# Patient Record
Sex: FEMALE | Race: WHITE | NOT HISPANIC OR LATINO | Employment: FULL TIME | URBAN - METROPOLITAN AREA
[De-identification: names, ages, dates, MRNs, and addresses within clinical notes are randomized per-mention and may not be internally consistent; named-entity substitution may affect disease eponyms.]

---

## 2017-05-11 DIAGNOSIS — I10 ESSENTIAL HYPERTENSION: ICD-10-CM

## 2017-05-11 DIAGNOSIS — Z13.820 SCREENING FOR OSTEOPOROSIS: Primary | ICD-10-CM

## 2017-05-11 RX ORDER — LISINOPRIL 20 MG/1
20 TABLET ORAL DAILY
Qty: 90 TABLET | Refills: 1 | Status: SHIPPED | OUTPATIENT
Start: 2017-05-11 | End: 2017-11-09

## 2017-05-11 NOTE — TELEPHONE ENCOUNTER
"POC note 11/23/16: Will change the lisinopril to 20mg and have her check some BPs at home and call us with readings. If <130/80 will stay on 20mg  Pt states her BPs in December were:  118/69, 125/82, 123/68, 127/71, 112/66, 119/68, 123/72, 124/74.   Pt also wanting to schedule bone density scan. POC note 11/23/16: Will repeat dexa 2 yrs from her last one and include the wrist at that time but won't treat just based on wrist scores.\"  According to pt's chart last DEXA 2014, pt due. DEXA orders placed, pt transferred to scheduling to make appt.    Dr. Lovell- ok to refill lisinopril until next annual due time?   "

## 2017-05-19 ENCOUNTER — RADIANT APPOINTMENT (OUTPATIENT)
Dept: BONE DENSITY | Facility: CLINIC | Age: 62
End: 2017-05-19
Attending: OBSTETRICS & GYNECOLOGY
Payer: COMMERCIAL

## 2017-05-19 ENCOUNTER — TELEPHONE (OUTPATIENT)
Dept: OBGYN | Facility: CLINIC | Age: 62
End: 2017-05-19

## 2017-05-19 DIAGNOSIS — Z13.820 SCREENING FOR OSTEOPOROSIS: ICD-10-CM

## 2017-05-19 PROCEDURE — 77080 DXA BONE DENSITY AXIAL: CPT | Mod: 59

## 2017-05-19 PROCEDURE — 77081 DXA BONE DENSITY APPENDICULR: CPT

## 2017-05-19 NOTE — TELEPHONE ENCOUNTER
Spoke w Komal cortez regarding taking of vitamins prior to scan They stated it would be okay for pt to come in for scan.  Informed that she should be able completed scan today as scheduled. No further questions voiced.

## 2017-05-19 NOTE — TELEPHONE ENCOUNTER
Reason for Call:  Other returning call    Detailed comments: Patient scheduled for dexa scan  Forgot to not take Vitamins took them yesterday morning     Phone Number Patient can be reached at: Home number on file [phone] (home)    Best Time: Anytime    Can we leave a detailed message on this number? YES    Call taken on 5/19/2017 at 8:46 AM by Xavi Chaparro

## 2017-11-09 DIAGNOSIS — I10 ESSENTIAL HYPERTENSION: ICD-10-CM

## 2017-11-09 RX ORDER — LISINOPRIL 20 MG/1
TABLET ORAL
Qty: 90 TABLET | Refills: 0 | Status: SHIPPED | OUTPATIENT
Start: 2017-11-09 | End: 2018-02-07

## 2017-11-09 NOTE — TELEPHONE ENCOUNTER
lisinopril (PRINIVIL/ZESTRIL) 20 MG tablet   Last Written Prescription Date:  5/11/17  Last Fill Quantity: 90,   # refills: 1  Last Office Visit with [facility] primary care provider:  11/23/16  Future Office visit: 12/27/17    Routing refill request to provider for review/approval because:  Pt due for annual. Has appointment scheduled. Three month refill sent per [place] protocol.

## 2018-02-07 ENCOUNTER — OFFICE VISIT (OUTPATIENT)
Dept: OBGYN | Facility: CLINIC | Age: 63
End: 2018-02-07
Attending: OBSTETRICS & GYNECOLOGY
Payer: COMMERCIAL

## 2018-02-07 ENCOUNTER — RADIANT APPOINTMENT (OUTPATIENT)
Dept: MAMMOGRAPHY | Facility: CLINIC | Age: 63
End: 2018-02-07
Attending: OBSTETRICS & GYNECOLOGY
Payer: COMMERCIAL

## 2018-02-07 VITALS
HEIGHT: 61 IN | BODY MASS INDEX: 40.02 KG/M2 | SYSTOLIC BLOOD PRESSURE: 110 MMHG | DIASTOLIC BLOOD PRESSURE: 70 MMHG | WEIGHT: 212 LBS

## 2018-02-07 DIAGNOSIS — M81.0 OSTEOPOROSIS, SENILE: ICD-10-CM

## 2018-02-07 DIAGNOSIS — Z13.228 SCREENING FOR METABOLIC DISORDER: ICD-10-CM

## 2018-02-07 DIAGNOSIS — Z12.31 VISIT FOR SCREENING MAMMOGRAM: ICD-10-CM

## 2018-02-07 DIAGNOSIS — I10 ESSENTIAL HYPERTENSION: ICD-10-CM

## 2018-02-07 DIAGNOSIS — Z13.220 ENCOUNTER FOR LIPID SCREENING FOR CARDIOVASCULAR DISEASE: ICD-10-CM

## 2018-02-07 DIAGNOSIS — Z13.6 ENCOUNTER FOR LIPID SCREENING FOR CARDIOVASCULAR DISEASE: ICD-10-CM

## 2018-02-07 DIAGNOSIS — E66.01 MORBID OBESITY (H): ICD-10-CM

## 2018-02-07 DIAGNOSIS — Z01.419 ENCOUNTER FOR GYNECOLOGICAL EXAMINATION WITHOUT ABNORMAL FINDING: Primary | ICD-10-CM

## 2018-02-07 LAB
ALBUMIN SERPL-MCNC: 3.7 G/DL (ref 3.4–5)
ALP SERPL-CCNC: 63 U/L (ref 40–150)
ALT SERPL W P-5'-P-CCNC: 28 U/L (ref 0–50)
ANION GAP SERPL CALCULATED.3IONS-SCNC: 7 MMOL/L (ref 3–14)
AST SERPL W P-5'-P-CCNC: 20 U/L (ref 0–45)
BILIRUB SERPL-MCNC: 0.6 MG/DL (ref 0.2–1.3)
BUN SERPL-MCNC: 15 MG/DL (ref 7–30)
CALCIUM SERPL-MCNC: 9.4 MG/DL (ref 8.5–10.1)
CHLORIDE SERPL-SCNC: 107 MMOL/L (ref 94–109)
CHOLEST SERPL-MCNC: 173 MG/DL
CO2 SERPL-SCNC: 25 MMOL/L (ref 20–32)
CREAT SERPL-MCNC: 0.65 MG/DL (ref 0.52–1.04)
GFR SERPL CREATININE-BSD FRML MDRD: >90 ML/MIN/1.7M2
GLUCOSE SERPL-MCNC: 86 MG/DL (ref 70–99)
HDLC SERPL-MCNC: 64 MG/DL
LDLC SERPL CALC-MCNC: 82 MG/DL
NONHDLC SERPL-MCNC: 109 MG/DL
POTASSIUM SERPL-SCNC: 4.1 MMOL/L (ref 3.4–5.3)
PROT SERPL-MCNC: 7.7 G/DL (ref 6.8–8.8)
SODIUM SERPL-SCNC: 139 MMOL/L (ref 133–144)
TRIGL SERPL-MCNC: 137 MG/DL

## 2018-02-07 PROCEDURE — 80061 LIPID PANEL: CPT | Performed by: OBSTETRICS & GYNECOLOGY

## 2018-02-07 PROCEDURE — 36415 COLL VENOUS BLD VENIPUNCTURE: CPT | Performed by: OBSTETRICS & GYNECOLOGY

## 2018-02-07 PROCEDURE — 80053 COMPREHEN METABOLIC PANEL: CPT | Performed by: OBSTETRICS & GYNECOLOGY

## 2018-02-07 PROCEDURE — 87624 HPV HI-RISK TYP POOLED RSLT: CPT | Performed by: OBSTETRICS & GYNECOLOGY

## 2018-02-07 PROCEDURE — G0145 SCR C/V CYTO,THINLAYER,RESCR: HCPCS | Performed by: OBSTETRICS & GYNECOLOGY

## 2018-02-07 PROCEDURE — 77067 SCR MAMMO BI INCL CAD: CPT | Mod: TC

## 2018-02-07 PROCEDURE — 99396 PREV VISIT EST AGE 40-64: CPT | Performed by: OBSTETRICS & GYNECOLOGY

## 2018-02-07 RX ORDER — LISINOPRIL 20 MG/1
TABLET ORAL
Qty: 90 TABLET | Refills: 3 | Status: SHIPPED | OUTPATIENT
Start: 2018-02-07 | End: 2019-02-15

## 2018-02-07 ASSESSMENT — ANXIETY QUESTIONNAIRES
GAD7 TOTAL SCORE: 0
1. FEELING NERVOUS, ANXIOUS, OR ON EDGE: NOT AT ALL
7. FEELING AFRAID AS IF SOMETHING AWFUL MIGHT HAPPEN: NOT AT ALL
IF YOU CHECKED OFF ANY PROBLEMS ON THIS QUESTIONNAIRE, HOW DIFFICULT HAVE THESE PROBLEMS MADE IT FOR YOU TO DO YOUR WORK, TAKE CARE OF THINGS AT HOME, OR GET ALONG WITH OTHER PEOPLE: NOT DIFFICULT AT ALL
3. WORRYING TOO MUCH ABOUT DIFFERENT THINGS: NOT AT ALL
2. NOT BEING ABLE TO STOP OR CONTROL WORRYING: NOT AT ALL
6. BECOMING EASILY ANNOYED OR IRRITABLE: NOT AT ALL
5. BEING SO RESTLESS THAT IT IS HARD TO SIT STILL: NOT AT ALL

## 2018-02-07 ASSESSMENT — PATIENT HEALTH QUESTIONNAIRE - PHQ9: 5. POOR APPETITE OR OVEREATING: NOT AT ALL

## 2018-02-07 NOTE — MR AVS SNAPSHOT
"              After Visit Summary   2/7/2018    Ana Dupree    MRN: [id number]           Patient Information     Date Of Birth          1955        Visit Information        Provider Department      2/7/2018 8:40 AM Oliva Lovell MD [facility] [place]        Today's Diagnoses     Encounter for gynecological examination without abnormal finding    -  1    Essential hypertension        Morbid obesity (H)        Osteoporosis, senile        Encounter for lipid screening for cardiovascular disease        Screening for metabolic disorder           Follow-ups after your visit        Who to contact     If you have questions or need follow up information about today's clinic visit or your schedule please contact [facility] directly at [phone].  Normal or non-critical lab and imaging results will be communicated to you by MyChart, letter or phone within 4 business days after the clinic has received the results. If you do not hear from us within 7 days, please contact the clinic through MyChart or phone. If you have a critical or abnormal lab result, we will notify you by phone as soon as possible.  Submit refill requests through [employer] or call your pharmacy and they will forward the refill request to us. Please allow 3 business days for your refill to be completed.          Additional Information About Your Visit        MyChart Information     [employer] lets you send messages to your doctor, view your test results, renew your prescriptions, schedule appointments and more. To sign up, go to [email or web address] . Click on \"Log in\" on the left side of the screen, which will take you to the Welcome page. Then click on \"Sign up Now\" on the right side of the page.     You will be asked to enter the access code listed below, as well as some personal information. Please follow the directions to create your username and password.     Your access code is: [email or web address]  Expires: " "2018  7:38 PM     Your access code will  in 90 days. If you need help or a new code, please call your [place] clinic or [phone].        Care EveryWhere ID     This is your Care EveryWhere ID. This could be used by other organizations to access your [place] medical records  [id number]        Your Vitals Were     Height BMI (Body Mass Index)                5' 0.5\" (1.537 m) 40.72 kg/m2           Blood Pressure from Last 3 Encounters:   18 110/70   17 104/63   16 92/58    Weight from Last 3 Encounters:   18 212 lb (96.2 kg)   17 200 lb (90.7 kg)   16 206 lb (93.4 kg)              We Performed the Following     Comprehensive metabolic panel     HPV High Risk Types DNA Cervical     Lipid panel reflex to direct LDL Fasting     Pap imaged thin layer screen with HPV - recommended age 30 - 65          Today's Medication Changes          These changes are accurate as of 18  7:38 PM.  If you have any questions, ask your nurse or doctor.               These medicines have changed or have updated prescriptions.        Dose/Directions    lisinopril 20 MG tablet   Commonly known as:  PRINIVIL/ZESTRIL   This may have changed:  See the new instructions.   Used for:  Essential hypertension   Changed by:  Oliva Lovell MD        TAKE 1 TABLET(20 MG) BY MOUTH DAILY   Quantity:  90 tablet   Refills:  3            Where to get your medicines      These medications were sent to [facility] [address] [place] [address] [address] AT [facility] [address]  [address], [facility] [address]     Phone:  [phone]     lisinopril 20 MG tablet                Primary Care Provider Office Phone # Fax #    Oliva Lovell [phone] [address] [address]        Equal Access to Services     CHEY PIKE AH: Hadii aad ku hadasho Soomaali, waaxda luqadaha, qaybta kaalmada adeegyada, go prieto " racquel lozoya ah. So [facility] [phone].    ATENCIÓN: Si mayo herron, tiene a cottrell disposición servicios gratuitos de asistencia lingüística. Elsy braun [phone].    We comply with applicable federal civil rights laws and [place] laws. We do not discriminate on the basis of race, color, national origin, age, disability, sex, sexual orientation, or gender identity.            Thank you!     Thank you for choosing [facility] [place]  for your care. Our goal is always to provide you with excellent care. Hearing back from our patients is one way we can continue to improve our services. Please take a few minutes to complete the written survey that you may receive in the mail after your visit with us. Thank you!             Your Updated Medication List - Protect others around you: Learn how to safely use, store and throw away your medicines at www.disposemymeds.org.          This list is accurate as of 2/7/18  7:38 PM.  Always use your most recent med list.                   Brand Name Dispense Instructions for use Diagnosis    Calcium carb-Vitamin D 500 mg [place]-200 units 500-200 MG-UNIT per tablet    OSCAL with D;Oyster Shell Calcium    100 tablet    Take 2 tablets by mouth every 12 hours    Primary hyperparathyroidism (H)       FISH OIL PO           lisinopril 20 MG tablet    PRINIVIL/ZESTRIL    90 tablet    TAKE 1 TABLET(20 MG) BY MOUTH DAILY    Essential hypertension       * VITAMIN D3 PO      Take 2,000 Units by mouth daily        * VITAMIN D (CHOLECALCIFEROL) PO      Take by mouth daily        * Notice:  This list has 2 medication(s) that are the same as other medications prescribed for you. Read the directions carefully, and ask your doctor or other care provider to review them with you.

## 2018-02-07 NOTE — PROGRESS NOTES
"  Ana is a 62 year old  female who presents for annual exam.     Besides routine health maintenance, she has no other health concerns today .    HPI:    Patient is generally doing well but has gained back all of the weight that she lost. Had gotten to 190# with diet and exercise and not snacking at night and was feeling great. Got to a size 16 for daughter's wedding last summer and now has gained it all back and really disappointed in herself. Is eating fairly healthy all day but then \"closet\" eats at night from 8-10 and undoes all of her work all day. Has also slacked quite a bit on exercise the last 2-3 months and just now is getting back into it. Going to [employer] in the mornings for a class and loves it but just now starting back to it. Niece getting  this summer so going to try to get back in shape for it    Cut down on her lisinopril from 30mg to 20mg b/c BP was too low. Hoped to lose enough weight to quit it all together. BP is still great today on 20mg.  Fasting to repeat labs today as well. Trigs were slightly elevated last time but other values were actually quite good.    No PMB, no abnormal d/c, no vasomotor sx. Not s.a for years. Kids keep trying to convince her to try dating but feels fat and unattractive so hasn't pursued it.    Patient has mild penia of hips and spine is normal. Ulna done at some point and was osteoporosis range but choosing not to treat just based on that.    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 Son and his GF moved back from [place] and living with her. Older daughter  and a teacher. Younger daughter is dating someone and lives in [place]    The patient does not have a primary care provider.      GYNECOLOGIC HISTORY:    No LMP recorded. Patient is postmenopausal.  She  reports that she has never smoked. She has never used smokeless tobacco.  Patient is not sexually active.  STD testing offered?  not sexually " active     Last PHQ-9 score on record =   PHQ-9 SCORE 2018   Total Score 1     Last GAD7 score on record =   SAMIR-7 SCORE 2018   Total Score 0     Alcohol Score = 1    HEALTH MAINTENANCE:  Cholesterol: 16   Total= 184, Triglycerides=187, HDL=54, LDL=93, FBS=76, A1C=5.3, TSH=0.60  Last Mammo: 16, Result: normal, Next Mammo: today   Pap: 13  Negative, Neg-HPV  Colonoscopy:  17, Result: polyp, repeat 5 years, Next Colonoscopy:   Dexa:  17  h/o osteoporosis in wrist  Left Hip -1.3, Right Hip -1.2, Spine -0.4 ulna -3.1  Health maintenance updated:  yes    HISTORY:  Obstetric History       T3      L3     SAB1   TAB0   Ectopic0   Multiple0   Live Births3       # Outcome Date GA Lbr Jesus/2nd Weight Sex Delivery Anes PTL Lv   4 Term 89 40w0d  8 lb 2 oz (3.685 kg) F Vag-Vacuum   NATASHA   3 Term 89 42w0d  8 lb 1 oz (3.657 kg) F Vag-Vacuum   NATASHA   2 Term 89    M    NATASHA   1 SAB                   Patient Active Problem List   Diagnosis     Essential hypertension     Osteoporosis, senile     Morbid obesity (H)     Past Surgical History:   Procedure Laterality Date     BIOPSY BREAST  2001, 2014    excisional left side-B9 fibrocystic change. stereotactic bx left at 2 oclock for calcs. path-atypical ductal proliferation     COLONOSCOPY N/A 2017    Procedure: COMBINED COLONOSCOPY, SINGLE OR MULTIPLE BIOPSY/POLYPECTOMY BY BIOPSY;  Surgeon: Jerald Grant MD;  Location:  GI     LITHOTRIPSY  2007     MAMMOPLASTY REDUCTION  2014    Dr. Trujillo and Reji. had atypical cells noted on a bx but also wanted reduction so done together.     PARATHYROIDECTOMY Right 2015    Procedure: PARATHYROIDECTOMY;  Surgeon: Lynnette Sewell MD;  Location: [facility] OR     TUBAL LIGATION  1994    post partum      Social History   Substance Use Topics     Smoking status: Never Smoker     Smokeless tobacco: Never Used     Alcohol use No      Problem (# of  "Occurrences) Relation (Name,Age of Onset)    Breast Cancer (1) Maternal Grandmother    CEREBROVASCULAR DISEASE (2) Mother, Paternal Grandmother    Colon Cancer (1) Father (90)    Coronary Artery Disease (1) Mother    DIABETES (1) Mother    Hyperlipidemia (1) Mother    Hypertension (1) Mother    Other Cancer (1) Maternal Grandmother            Current Outpatient Prescriptions   Medication Sig     VITAMIN D, CHOLECALCIFEROL, PO Take by mouth daily     Omega-3 Fatty Acids (FISH OIL PO)      lisinopril (PRINIVIL/ZESTRIL) 20 MG tablet TAKE 1 TABLET(20 MG) BY MOUTH DAILY     Cholecalciferol (VITAMIN D3 PO) Take 2,000 Units by mouth daily     calcium carb 1250 mg, 500 mg [place],/vitamin D 200 units (OSCAL WITH D) 500-200 MG-UNIT per tablet Take 2 tablets by mouth every 12 hours     [DISCONTINUED] lisinopril (PRINIVIL/ZESTRIL) 20 MG tablet TAKE 1 TABLET(20 MG) BY MOUTH DAILY     [DISCONTINUED] lisinopril (PRINIVIL,ZESTRIL) 30 MG tablet TAKE 1 TABLET BY MOUTH DAILY     No current facility-administered medications for this visit.      No Known Allergies    Past medical, surgical, social and family histories were reviewed and updated in EPIC.    ROS:   12 point review of systems negative other than symptoms noted below.    EXAM:  /70  Ht 5' 0.5\" (1.537 m)  Wt 212 lb (96.2 kg)  BMI 40.72 kg/m2   BMI: Body mass index is 40.72 kg/(m^2).    PHYSICAL EXAM:  Constitutional:  Appearance: Well nourished, well developed, alert, in no acute distress  Neck:  Lymph Nodes:  No lymphadenopathy present    Thyroid:  Gland size normal, nontender, no nodules or masses present  on palpation  Chest:  Respiratory Effort:  Breathing unlabored  Cardiovascular:    Heart: Auscultation:  Regular rate, normal rhythm, no murmurs present  Breasts: Palpation of Breasts and Axillae:  No masses present on palpation, no breast tenderness., No nodularity, asymmetry or nipple discharge bilaterally. and HAS TWO SMOOTH AND VERY ROUND DENSITIES IN THE LATER " LEFT BREAST AT 2 AND 5 OCLOCK. BOTH LIKELY SCAR TISSUE AND BREAST RECONSTRUCTION EFFECTS FROM REDUCTION  Gastrointestinal:   Abdominal Examination:  Abdomen nontender to palpation, tone normal without rigidity or guarding, no masses present, umbilicus without lesions   Liver and Spleen:  No hepatomegaly present, liver nontender to palpation    Hernias:  No hernias present  Lymphatic: Lymph Nodes:  No other lymphadenopathy present  Skin:  General Inspection:  No rashes present, no lesions present, no areas of  discoloration    Genitalia and Groin:  No rashes present, no lesions present, no areas of  discoloration, no masses present  Neurologic/Psychiatric:    Mental Status:  Oriented X3     Pelvic Exam:  External Genitalia:     Normal appearance for age, no discharge present, no tenderness present, no inflammatory lesions present, color normal  Vagina:     Normal vaginal vault without central or paravaginal defects, no discharge present, no inflammatory lesions present, no masses present  Bladder:     Nontender to palpation  Urethra:   Urethral Body:  Urethra palpation normal, urethra structural support normal   Urethral Meatus:  No erythema or lesions present  Cervix:     Appearance healthy, no lesions present, nontender to palpation, no bleeding present  Uterus:     Uterus: firm, normal sized and nontender, anteverted in position.   Adnexa:     No adnexal tenderness present, no adnexal masses present  Perineum:     Perineum within normal limits, no evidence of trauma, no rashes or skin lesions present  Anus:     Anus within normal limits, no hemorrhoids present  Inguinal Lymph Nodes:     No lymphadenopathy present  Pubic Hair:     Normal pubic hair distribution for age  Genitalia and Groin:     No rashes present, no lesions present, no areas of discoloration, no masses present      COUNSELING:   Reviewed preventive health counseling, as reflected in patient instructions  Special attention given to:        Regular  exercise       Healthy diet/nutrition    BMI: Body mass index is 40.72 kg/(m^2).  Weight management plan: Patient referred to endocrine and/or weight management specialty    ASSESSMENT:  62 year old female with satisfactory annual exam.    ICD-10-CM    1. Encounter for gynecological examination without abnormal finding Z01.419 Pap imaged thin layer screen with HPV - recommended age 30 - 65     HPV High Risk Types DNA Cervical   2. Essential hypertension I10 lisinopril (PRINIVIL/ZESTRIL) 20 MG tablet   3. Morbid obesity (H) E66.01    4. Osteoporosis, senile M81.0    5. Encounter for lipid screening for cardiovascular disease Z13.220 Lipid panel reflex to direct LDL Fasting    Z13.6    6. Screening for metabolic disorder Z13.228 Comprehensive metabolic panel       PLAN:  Pap and cotesting done today along with mammo    BP is low at 20mg lisinopril. Discussed cutting down to 10mg but at this point will just work on diet/exercise/weight loss and hopefully just get to a point where could stop completely rather than just cutting down. Not having side effects and BP is normal and not hypotensive.    Fasting lipids and cmp today    Repeat dexa in 2 yrs or so as only her ulna is osteoporotic and hips/spine very minimally effected    Patient has consider medifast and slimgenics but too expensive. Encouraged to contact nutrition at [facility], and check insurance to see if nutrition is covered, and see if they can lay out an eating plan for her to help her lose weight effectively and long term since at a BMI of 40 she has very low metabolic rate and will be harder to lose and sustain loss over time. Have discussed bariatric surgery in past and she did consider it but then got nervous and didn't follow up    Oliva Lovell MD

## 2018-02-07 NOTE — LETTER
February 19, 2018    Ana Dupree  [address]   [facility] [address]    Dear Ana,  We are happy to inform you that your PAP smear result from 2/7/18 is normal.  We are now able to do a follow up test on PAP smears. The DNA test is for HPV (Human Papilloma Virus). Cervical cancer is closely linked with certain types of HPV. Your result showed no evidence of high risk HPV.  Therefore we recommend you return in 3 years for your next pap smear.  You will still need to return to the clinic every year for an annual exam and other preventive tests.  Please contact the clinic at [phone] with any questions.  Sincerely,    Oliva Lovell MD/silvina

## 2018-02-08 ASSESSMENT — PATIENT HEALTH QUESTIONNAIRE - PHQ9: SUM OF ALL RESPONSES TO PHQ QUESTIONS 1-9: 1

## 2018-02-08 ASSESSMENT — ANXIETY QUESTIONNAIRES: GAD7 TOTAL SCORE: 0

## 2018-02-09 LAB
COPATH REPORT: NORMAL
PAP: NORMAL

## 2018-02-13 LAB
FINAL DIAGNOSIS: NORMAL
HPV HR 12 DNA CVX QL NAA+PROBE: NEGATIVE
HPV16 DNA SPEC QL NAA+PROBE: NEGATIVE
HPV18 DNA SPEC QL NAA+PROBE: NEGATIVE
SPECIMEN DESCRIPTION: NORMAL
SPECIMEN SOURCE CVX/VAG CYTO: NORMAL

## 2018-05-21 ENCOUNTER — TELEPHONE (OUTPATIENT)
Dept: OBGYN | Facility: CLINIC | Age: 63
End: 2018-05-21

## 2018-06-04 ENCOUNTER — TELEPHONE (OUTPATIENT)
Dept: OBGYN | Facility: CLINIC | Age: 63
End: 2018-06-04

## 2018-06-04 NOTE — TELEPHONE ENCOUNTER
Patient scheduled for bone scan 6/5, took a vitamin with calcium today.  Wondering if she needs to RS her scan. Tech not available at time of call.

## 2018-06-04 NOTE — TELEPHONE ENCOUNTER
Called pt back and reassured her she can continue with the scan tomorrow as stated by one of the imaging tech's, Telma here in office. Reminded her to not take any further vitamins/calcium prior to test.  Verbalized understanding and no further questions.  Danyelle ROJAS RN

## 2018-06-05 ENCOUNTER — RADIANT APPOINTMENT (OUTPATIENT)
Dept: BONE DENSITY | Facility: CLINIC | Age: 63
End: 2018-06-05
Payer: COMMERCIAL

## 2018-06-05 DIAGNOSIS — Z78.0 ASYMPTOMATIC POSTMENOPAUSAL STATE: ICD-10-CM

## 2018-06-05 PROCEDURE — 77081 DXA BONE DENSITY APPENDICULR: CPT

## 2018-06-05 PROCEDURE — 77080 DXA BONE DENSITY AXIAL: CPT | Mod: 59

## 2018-07-16 ENCOUNTER — HOSPITAL ENCOUNTER (EMERGENCY)
Facility: CLINIC | Age: 63
Discharge: HOME | End: 2018-07-16
Attending: EMERGENCY MEDICINE | Admitting: EMERGENCY MEDICINE
Payer: COMMERCIAL

## 2018-07-16 VITALS
TEMPERATURE: 98.1 F | SYSTOLIC BLOOD PRESSURE: 129 MMHG | RESPIRATION RATE: 16 BRPM | DIASTOLIC BLOOD PRESSURE: 85 MMHG | HEIGHT: 60 IN | OXYGEN SATURATION: 98 % | WEIGHT: 200 LBS | BODY MASS INDEX: 39.27 KG/M2

## 2018-07-16 DIAGNOSIS — N23 RENAL COLIC: ICD-10-CM

## 2018-07-16 DIAGNOSIS — N20.0 KIDNEY STONE: ICD-10-CM

## 2018-07-16 LAB
ALBUMIN SERPL-MCNC: 3.8 G/DL (ref 3.4–5)
ALBUMIN UR-MCNC: NEGATIVE MG/DL
ALP SERPL-CCNC: 67 U/L (ref 40–150)
ALT SERPL W P-5'-P-CCNC: 28 U/L (ref 0–50)
ANION GAP SERPL CALCULATED.3IONS-SCNC: 7 MMOL/L (ref 3–14)
APPEARANCE UR: CLEAR
AST SERPL W P-5'-P-CCNC: 22 U/L (ref 0–45)
BASOPHILS # BLD AUTO: 0 10E9/L (ref 0–0.2)
BASOPHILS NFR BLD AUTO: 0.2 %
BILIRUB SERPL-MCNC: 0.8 MG/DL (ref 0.2–1.3)
BILIRUB UR QL STRIP: NEGATIVE
BUN SERPL-MCNC: 12 MG/DL (ref 7–30)
CALCIUM SERPL-MCNC: 9 MG/DL (ref 8.5–10.1)
CHLORIDE SERPL-SCNC: 108 MMOL/L (ref 94–109)
CO2 SERPL-SCNC: 26 MMOL/L (ref 20–32)
COLOR UR AUTO: ABNORMAL
CREAT SERPL-MCNC: 0.7 MG/DL (ref 0.52–1.04)
DIFFERENTIAL METHOD BLD: NORMAL
EOSINOPHIL # BLD AUTO: 0.1 10E9/L (ref 0–0.7)
EOSINOPHIL NFR BLD AUTO: 0.9 %
ERYTHROCYTE [DISTWIDTH] IN BLOOD BY AUTOMATED COUNT: 13.9 % (ref 10–15)
GFR SERPL CREATININE-BSD FRML MDRD: 85 ML/MIN/1.7M2
GLUCOSE SERPL-MCNC: 118 MG/DL (ref 70–99)
GLUCOSE UR STRIP-MCNC: NEGATIVE MG/DL
HCT VFR BLD AUTO: 37.6 % (ref 35–47)
HGB BLD-MCNC: 12.1 G/DL (ref 11.7–15.7)
HGB UR QL STRIP: ABNORMAL
IMM GRANULOCYTES # BLD: 0 10E9/L (ref 0–0.4)
IMM GRANULOCYTES NFR BLD: 0.1 %
KETONES UR STRIP-MCNC: NEGATIVE MG/DL
LEUKOCYTE ESTERASE UR QL STRIP: ABNORMAL
LYMPHOCYTES # BLD AUTO: 1.7 10E9/L (ref 0.8–5.3)
LYMPHOCYTES NFR BLD AUTO: 19.1 %
MCH RBC QN AUTO: 28.4 PG (ref 26.5–33)
MCHC RBC AUTO-ENTMCNC: 32.2 G/DL (ref 31.5–36.5)
MCV RBC AUTO: 88 FL (ref 78–100)
MONOCYTES # BLD AUTO: 0.8 10E9/L (ref 0–1.3)
MONOCYTES NFR BLD AUTO: 9 %
MUCOUS THREADS #/AREA URNS LPF: PRESENT /LPF
NEUTROPHILS # BLD AUTO: 6.1 10E9/L (ref 1.6–8.3)
NEUTROPHILS NFR BLD AUTO: 70.7 %
NITRATE UR QL: NEGATIVE
NRBC # BLD AUTO: 0 10*3/UL
NRBC BLD AUTO-RTO: 0 /100
PH UR STRIP: 6 PH (ref 5–7)
PLATELET # BLD AUTO: 234 10E9/L (ref 150–450)
POTASSIUM SERPL-SCNC: 4.2 MMOL/L (ref 3.4–5.3)
PROT SERPL-MCNC: 7.9 G/DL (ref 6.8–8.8)
RBC # BLD AUTO: 4.26 10E12/L (ref 3.8–5.2)
RBC #/AREA URNS AUTO: 10 /HPF (ref 0–2)
SODIUM SERPL-SCNC: 141 MMOL/L (ref 133–144)
SOURCE: ABNORMAL
SP GR UR STRIP: 1.01 (ref 1–1.03)
SQUAMOUS #/AREA URNS AUTO: 3 /HPF (ref 0–1)
UROBILINOGEN UR STRIP-MCNC: NORMAL MG/DL (ref 0–2)
WBC # BLD AUTO: 8.6 10E9/L (ref 4–11)
WBC #/AREA URNS AUTO: 4 /HPF (ref 0–5)

## 2018-07-16 PROCEDURE — 87086 URINE CULTURE/COLONY COUNT: CPT | Performed by: EMERGENCY MEDICINE

## 2018-07-16 PROCEDURE — 85025 COMPLETE CBC W/AUTO DIFF WBC: CPT | Performed by: EMERGENCY MEDICINE

## 2018-07-16 PROCEDURE — 81001 URINALYSIS AUTO W/SCOPE: CPT | Performed by: EMERGENCY MEDICINE

## 2018-07-16 PROCEDURE — 99283 EMERGENCY DEPT VISIT LOW MDM: CPT

## 2018-07-16 PROCEDURE — 80053 COMPREHEN METABOLIC PANEL: CPT | Performed by: EMERGENCY MEDICINE

## 2018-07-16 RX ORDER — SODIUM CHLORIDE 9 MG/ML
INJECTION, SOLUTION INTRAVENOUS CONTINUOUS
Status: DISCONTINUED | OUTPATIENT
Start: 2018-07-16 | End: 2018-07-16 | Stop reason: HOSPADM

## 2018-07-16 RX ORDER — KETOROLAC TROMETHAMINE 15 MG/ML
15 INJECTION, SOLUTION INTRAMUSCULAR; INTRAVENOUS ONCE
Status: DISCONTINUED | OUTPATIENT
Start: 2018-07-16 | End: 2018-07-16 | Stop reason: HOSPADM

## 2018-07-16 ASSESSMENT — ENCOUNTER SYMPTOMS
NAUSEA: 0
VOMITING: 0
HEMATURIA: 0
DIFFICULTY URINATING: 1
FEVER: 0
FLANK PAIN: 1

## 2018-07-16 NOTE — ED AVS SNAPSHOT
Emergency Department    [address] [facility] [address]    Phone:  [phone]    Fax:  [phone]                                       Ana Dupree   MRN: [id number]    Department:   Emergency Department   Date of Visit:  7/16/2018           Patient Information     Date Of Birth          1955        Your diagnoses for this visit were:     Renal colic     Kidney stone        You were seen by Luis Enrique Black MD.      Follow-up Information     Follow up with Oliva Lovell MD.    Specialty:  OB/Gyn    Why:  As needed, If symptoms worsen    Contact information:    [address]  [facility] [address] [phone]          Follow up with  Emergency Department.    Specialty:  EMERGENCY MEDICINE    Why:  As needed, If symptoms worsen    Contact information:    [address] [facility] [id number] [phone]        Discharge Instructions       Discharge Instructions  Kidney Stones    Kidney stones are a common problem that can cause a lot of pain but fortunately are usually not dangerous. Kidney stones form in the kidney and then can cause a blockage (obstruction) of the flow of urine from the kidney which leads to pain. Most patients can manage kidney stones at home (without a hospital stay).  However, sometimes your condition may be worse than it seemed at first, or may get worse with time. Most kidney stones will pass on their own, but occasionally stones may need to be removed by an urologist.    Generally, every Emergency Department visit should have a follow-up clinic visit with either a primary or a specialty clinic/provider. Please follow-up as instructed by your emergency provider today.      Return to the Emergency Department if:    Your pain is not controlled despite the medications provided or recommended.    You are vomiting (throwing up) and cannot keep fluids or medications down.    You develop a fever (>100.4 F).    You feel much more  ill or develop new symptoms.  What can I do to help myself?    Be sure to drink plenty of fluids.    If instructed to do so, strain your urine (pee) with the urine strainer you were provided with today. Your stone may look like a grain of sand or a small pebble. Collect any stones in the cup provided and bring to your follow-up appointment.    Staying active is good, and may help the stone to pass. You may do whatever you feel up to doing without restrictions.   Treatment:    Non-steroidal anti-inflammatory drugs (NSAIDs). This includes prescription medicines like Toradol  (ketorolac) and non-prescription medicines like Advil  (ibuprofen) and Nuprin  (ibuprofen) and Naproxen. These pain relievers are very effective for kidney stones.    Nausea (sick to your stomach) medication.  Nausea and vomiting are common with kidney stones, so your provider may send you home with medicine for this.     Flomax  (tamsulosin). This medicine is sometimes used for men with prostate problems, but also can help kidney stones to pass. Its effectiveness is controversial or questionable so it is prescribed in certain situations. This medicine can lower blood pressure, and you may feel faint/lightheaded, especially when you first stand up. Be sure to get up gradually, sit down if you feel faint, and avoid activity where feeling faint would be dangerous, such as climbing ladders.  If you were given a prescription for medicine here today, be sure to read all of the information (including the package insert) that comes with your prescription.  This will include important information about the medicine, its side effects, and any warnings that you need to know about.  The pharmacist who fills the prescription can provide more information and answer questions you may have about the medicine.  If you have questions or concerns that the pharmacist cannot address, please call or return to the Emergency Department.   Remember that you can always  come back to the Emergency Department if you are not able to see your regular provider in the amount of time listed above, if you get any new symptoms, or if there is anything that worries you.    24 Hour Appointment Hotline       To make an appointment at any [facility], call [phone] ([phone]). If you don't have a family doctor or clinic, we will help you find one. [place] clinics are conveniently located to serve the needs of you and your family.             Review of your medicines      Our records show that you are taking the medicines listed below. If these are incorrect, please call your family doctor or clinic.        Dose / Directions Last dose taken    Calcium carb-Vitamin D 500 mg [place]-200 units 500-200 MG-UNIT per tablet   Commonly known as:  OSCAL with D;Oyster Shell Calcium   Dose:  2 tablet   Quantity:  100 tablet        Take 2 tablets by mouth every 12 hours   Refills:  0        FISH OIL PO        Refills:  0        lisinopril 20 MG tablet   Commonly known as:  PRINIVIL/ZESTRIL   Quantity:  90 tablet        TAKE 1 TABLET(20 MG) BY MOUTH DAILY   Refills:  3        * VITAMIN D3 PO   Dose:  2000 Units        Take 2,000 Units by mouth daily   Refills:  0        * VITAMIN D (CHOLECALCIFEROL) PO        Take by mouth daily   Refills:  0        * Notice:  This list has 2 medication(s) that are the same as other medications prescribed for you. Read the directions carefully, and ask your doctor or other care provider to review them with you.            Procedures and tests performed during your visit     CBC with platelets differential    Comprehensive metabolic panel    UA reflex to Microscopic and Culture    Urine Culture Aerobic Bacterial      Orders Needing Specimen Collection     None      Pending Results     Date and Time Order Name Status Description    7/16/2018 0740 Urine Culture Aerobic Bacterial In process             Pending Culture Results     Date and Time Order Name Status  Description    7/16/2018 0740 Urine Culture Aerobic Bacterial In process             Pending Results Instructions     If you had any lab results that were not finalized at the time of your Discharge, you can call the ED Lab Result RN at [phone]. You will be contacted by this team for any positive Lab results or changes in treatment. The nurses are available 7 days a week from 10A to 6:30P.  You can leave a message 24 hours per day and they will return your call.        Test Results From Your Hospital Stay        7/16/2018  8:10 AM      Component Results     Component Value Ref Range & Units Status    WBC 8.6 4.0 - 11.0 10e9/L Final    RBC Count 4.26 3.8 - 5.2 10e12/L Final    Hemoglobin 12.1 11.7 - 15.7 g/dL Final    Hematocrit 37.6 35.0 - 47.0 % Final    MCV 88 78 - 100 fl Final    MCH 28.4 26.5 - 33.0 pg Final    MCHC 32.2 31.5 - 36.5 g/dL Final    RDW 13.9 10.0 - 15.0 % Final    Platelet Count 234 150 - 450 10e9/L Final    Diff Method Automated Method  Final    % Neutrophils 70.7 % Final    % Lymphocytes 19.1 % Final    % Monocytes 9.0 % Final    % Eosinophils 0.9 % Final    % Basophils 0.2 % Final    % Immature Granulocytes 0.1 % Final    Nucleated RBCs 0 0 /100 Final    Absolute Neutrophil 6.1 1.6 - 8.3 10e9/L Final    Absolute Lymphocytes 1.7 0.8 - 5.3 10e9/L Final    Absolute Monocytes 0.8 0.0 - 1.3 10e9/L Final    Absolute Eosinophils 0.1 0.0 - 0.7 10e9/L Final    Absolute Basophils 0.0 0.0 - 0.2 10e9/L Final    Abs Immature Granulocytes 0.0 0 - 0.4 10e9/L Final    Absolute Nucleated RBC 0.0  Final         7/16/2018  8:33 AM      Component Results     Component Value Ref Range & Units Status    Sodium 141 133 - 144 mmol/L Final    Potassium 4.2 3.4 - 5.3 mmol/L Final    Chloride 108 94 - 109 mmol/L Final    Carbon Dioxide 26 20 - 32 mmol/L Final    Anion Gap 7 3 - 14 mmol/L Final    Glucose 118 (H) 70 - 99 mg/dL Final    Urea Nitrogen 12 7 - 30 mg/dL Final    Creatinine 0.70 0.52 - 1.04 mg/dL Final     GFR Estimate 85 >60 mL/min/1.7m2 Final    Non  GFR Calc    GFR Estimate If Black >90 >60 mL/min/1.7m2 Final    African American GFR Calc    Calcium 9.0 8.5 - 10.1 mg/dL Final    Bilirubin Total 0.8 0.2 - 1.3 mg/dL Final    Albumin 3.8 3.4 - 5.0 g/dL Final    Protein Total 7.9 6.8 - 8.8 g/dL Final    Alkaline Phosphatase 67 40 - 150 U/L Final    ALT 28 0 - 50 U/L Final    AST 22 0 - 45 U/L Final         7/16/2018  8:01 AM      Component Results     Component Value Ref Range & Units Status    Color Urine Light Yellow  Final    Appearance Urine Clear  Final    Glucose Urine Negative NEG^Negative mg/dL Final    Bilirubin Urine Negative NEG^Negative Final    Ketones Urine Negative NEG^Negative mg/dL Final    Specific Gravity Urine 1.007 1.003 - 1.035 Final    Blood Urine Moderate (A) NEG^Negative Final    pH Urine 6.0 5.0 - 7.0 pH Final    Protein Albumin Urine Negative NEG^Negative mg/dL Final    Urobilinogen mg/dL Normal 0.0 - 2.0 mg/dL Final    Nitrite Urine Negative NEG^Negative Final    Leukocyte Esterase Urine Moderate (A) NEG^Negative Final    Source Midstream Urine  Final    RBC Urine 10 (H) 0 - 2 /HPF Final    WBC Urine 4 0 - 5 /HPF Final    Squamous Epithelial /HPF Urine 3 (H) 0 - 1 /HPF Final    Mucous Urine Present (A) NEG^Negative /LPF Final         7/16/2018  8:23 AM                Clinical Quality Measure: Blood Pressure Screening     Your blood pressure was checked while you were in the emergency department today. The last reading we obtained was  BP: 129/85 . Please read the guidelines below about what these numbers mean and what you should do about them.  If your systolic blood pressure (the top number) is less than 120 and your diastolic blood pressure (the bottom number) is less than 80, then your blood pressure is normal. There is nothing more that you need to do about it.  If your systolic blood pressure (the top number) is 120-139 or your diastolic blood pressure (the bottom number)  is 80-89, your blood pressure may be higher than it should be. You should have your blood pressure rechecked within a year by a primary care provider.  If your systolic blood pressure (the top number) is 140 or greater or your diastolic blood pressure (the bottom number) is 90 or greater, you may have high blood pressure. High blood pressure is treatable, but if left untreated over time it can put you at risk for heart attack, stroke, or kidney failure. You should have your blood pressure rechecked by a primary care provider within the next 4 weeks.  If your provider in the emergency department today gave you specific instructions to follow-up with your doctor or provider even sooner than that, you should follow that instruction and not wait for up to 4 weeks for your follow-up visit.        Thank you for choosing [facility]       Thank you for choosing [facility] for your care. Our goal is always to provide you with excellent care. Hearing back from our patients is one way we can continue to improve our services. Please take a few minutes to complete the written survey that you may receive in the mail after you visit with us. Thank you!        Care EveryWhere ID     This is your Care EveryWhere ID. This could be used by other organizations to access your [facility] medical records  [id number]        Equal Access to Services     CHEY PIKE : Marino Ibarra, nael torres, pat heredia, go florence. So [facility] [phone].    ATENCIÓN: Si habla español, tiene a cottrell disposición servicios gratuitos de asistencia lingüística. Elsy al [phone].    We comply with applicable federal civil rights laws and [place] laws. We do not discriminate on the basis of race, color, national origin, age, disability, sex, sexual orientation, or gender identity.            After Visit Summary       This is your record. Keep this with you and show to your community pharmacist(s)  and doctor(s) at your next visit.

## 2018-07-16 NOTE — ED PROVIDER NOTES
History     Chief Complaint:  Flank Pain     HPI   Ana Dupree is a 62 year old female, with a history of kidney stones s/p lithotripsy, who presents to the ED for evaluation of right flank pain. The patient reports she developed flank pain at 3:00AM today. She has been unable to sleep and walk due to the pain. The patient notes she has urinary urgency but has difficulty urinating. She has not had a bowel movement either. The patient denies any fever, nausea, vomiting, or hematuria. She denies history of cholecystectomy.      Allergies:  No known drug allergies     Medications:    Oscal w/ D  Vitamin D3  Lisinopril    Fish oil    Past Medical History:    Osteoporosis  Hyperparathyroidism  Kidney stones  Depression  Sleep apnea   HTN  HLD    Past Surgical History:    Breast biopsy  Lithotripsy   Reduction mammoplasty  Right parathyroidectomy  Tubal ligation     Family History:    Stroke  Cerebrovascular disease  Colon cancer  Diabetes  HLD  Ulcers  CAD  HTN    Social History:  Smoking status: Never smoker   Alcohol use: No  Presents to ED alone    Marital Status:   [5]     Review of Systems   Constitutional: Negative for fever.   Gastrointestinal: Negative for nausea and vomiting.   Genitourinary: Positive for difficulty urinating, flank pain and urgency. Negative for hematuria.   All other systems reviewed and are negative.    Physical Exam     Patient Vitals for the past 24 hrs:   BP Temp Temp src Heart Rate Resp SpO2 Height Weight   07/16/18 0847 129/85 - - 68 16 98 % - -   07/16/18 0845 129/85 - - - - - - -   07/16/18 0734 127/83 98.1  F (36.7  C) Oral 68 16 97 % 1.524 m (5') 90.7 kg (200 lb)     Physical Exam  General: Alert, interactive in mild distress  Head:  Scalp is atraumatic  Eyes:  The pupils are equal, round, and reactive to light    EOM's intact    No scleral icterus  ENT:      Nose:  The external nose is normal  Ears:  External ears are normal  Mouth/Throat: The oropharynx is  normal    Mucus membranes are moist       Neck:  Normal range of motion.      There is no rigidity.    Trachea is in the midline         CV:  Regular rate and rhythm    No murmur   Resp:  Breath sounds are clear bilaterally    Non-labored, no retractions or accessory muscle use      GI:  Right CVA tenderness. Abdomen is soft, no distension.      MS:  Normal strength in all 4 extremities  Skin:  Warm and dry, No rash or lesions noted.  Neuro: Strength 5/5 x4.    Psych:  Awake. Alert.  Normal affect.      Appropriate interactions.    Emergency Department Course     Laboratory:  UA: Blood moderate, Leukocyte esterase moderate, RBC 10(H), Squamous epithelial 3(H), Mucous present, o/w Negative     Urine culture: In process     CBC: o/w WNL (WBC 8.6, HGB 12.1, )  CMP: Glucose 118(H), o/w WNL (Creatinine 0.70)     Emergency Department Course:  Past medical records, nursing notes, and vitals reviewed.  0732: I performed an exam of the patient and obtained history, as documented above.    Patient provided a urine sample.     Blood drawn.    0807: I rechecked the patient. Explained findings to patient.    0840: I rechecked the patient.     Findings and plan explained to the Patient. Patient discharged home with instructions regarding supportive care, medications, and reasons to return. The importance of close follow-up was reviewed.     Impression & Plan      Medical Decision Making:  Ms. Dupree was seen and evaluated. The above work-up was undertaken. Her pain completely resolved at the time of ED evaluation. There's no signs of an acute UTI or pyelonephritis. I favor ureterolithiasis, and I discussed CT imaging with the patient. However, given the fact that she is pain-free at this time, she is declining any further radiographic intervention. I've recommend she strain her urine, use Tylenol and Ibuprofen for pain, and return if new symptoms develop. Patient states understanding of this. She understands that there  could be recurrence of her symptoms as it is unclear if she potentially had a stone pass. With a complete lack of ongoing pain, I doubt acute appendicitis, bowel obstruction, or more concerning illness. Patient was in agreement with this plan and subsequently discharged to home. Will return if new symptoms develop.     Diagnosis:    ICD-10-CM   1. Renal colic N23   2. Kidney stone N20.0     Disposition: Patient discharged to home     Preeti Wade  7/16/2018    EMERGENCY DEPARTMENT    Scribe Disclosure:  I, Preeti Wade, am serving as a scribe at 7:32 AM on 7/16/2018 to document services personally performed by Luis Enrique Black MD based on my observations and the provider's statements to me.          Luis Enrique Black MD  07/16/18 8761

## 2018-07-16 NOTE — ED AVS SNAPSHOT
Emergency Department    [address] [facility] [address]    Phone:  [phone]    Fax:  [phone]                                       Ana Dupree   MRN: [id number]    Department:   Emergency Department   Date of Visit:  7/16/2018           After Visit Summary Signature Page     I have received my discharge instructions, and my questions have been answered. I have discussed any challenges I see with this plan with the nurse or doctor.    ..........................................................................................................................................  Patient/Patient Representative Signature      ..........................................................................................................................................  Patient Representative Print Name and Relationship to Patient    ..................................................               ................................................  Date                                            Time    ..........................................................................................................................................  Reviewed by Signature/Title    ...................................................              ..............................................  Date                                                            Time

## 2018-07-17 ENCOUNTER — TELEPHONE (OUTPATIENT)
Dept: OBGYN | Facility: CLINIC | Age: 63
End: 2018-07-17

## 2018-07-17 LAB
BACTERIA SPEC CULT: NORMAL
Lab: NORMAL
SPECIMEN SOURCE: NORMAL

## 2018-07-17 NOTE — TELEPHONE ENCOUNTER
Left message informing pt that she saw Dr. Deonte Jarrett. Included call back number for questions.

## 2018-08-27 ENCOUNTER — TELEPHONE (OUTPATIENT)
Dept: NURSING | Facility: CLINIC | Age: 63
End: 2018-08-27

## 2018-08-27 DIAGNOSIS — R06.83 SNORING: ICD-10-CM

## 2018-08-27 DIAGNOSIS — E66.01 MORBID OBESITY (H): Primary | ICD-10-CM

## 2018-08-27 DIAGNOSIS — Z86.69 HISTORY OF SLEEP APNEA: ICD-10-CM

## 2018-08-27 NOTE — TELEPHONE ENCOUNTER
Will route to Dr Lovell for orders- See Dr Garland's note below.    Pt notified that we are waiting for an order to be signed.

## 2018-08-27 NOTE — TELEPHONE ENCOUNTER
This can wait for Dr. Lovell to sign. The ordering physician will get the results. She was last seen in February, this referral can wait one more week. Please let the patient know the referral will be placed in one week's time.

## 2018-09-11 ENCOUNTER — OFFICE VISIT (OUTPATIENT)
Dept: SLEEP MEDICINE | Facility: CLINIC | Age: 63
End: 2018-09-11
Payer: COMMERCIAL

## 2018-09-11 VITALS
HEIGHT: 60 IN | SYSTOLIC BLOOD PRESSURE: 104 MMHG | TEMPERATURE: 98.5 F | DIASTOLIC BLOOD PRESSURE: 59 MMHG | BODY MASS INDEX: 41.86 KG/M2 | WEIGHT: 213.2 LBS | OXYGEN SATURATION: 98 % | HEART RATE: 55 BPM | RESPIRATION RATE: 16 BRPM

## 2018-09-11 DIAGNOSIS — E66.01 MORBID OBESITY (H): ICD-10-CM

## 2018-09-11 DIAGNOSIS — R06.83 SNORING: ICD-10-CM

## 2018-09-11 DIAGNOSIS — G47.19 EXCESSIVE DAYTIME SLEEPINESS: ICD-10-CM

## 2018-09-11 DIAGNOSIS — Z86.69 HISTORY OF SLEEP APNEA: ICD-10-CM

## 2018-09-11 DIAGNOSIS — G47.33 OSA (OBSTRUCTIVE SLEEP APNEA): Primary | ICD-10-CM

## 2018-09-11 PROCEDURE — 99204 OFFICE O/P NEW MOD 45 MIN: CPT | Performed by: INTERNAL MEDICINE

## 2018-09-11 NOTE — PATIENT INSTRUCTIONS
1. Obstructive sleep apnea    - Sleep study from 2009 showed severe sleep apnea  - Home sleep test to reassess sleep apnea  - Follow up after sleep study to plan management     Your BMI is Body mass index is 41.64 kg/(m^2).  Weight management is a personal decision.  If you are interested in exploring weight loss strategies, the following discussion covers the approaches that may be successful. Body mass index (BMI) is one way to tell whether you are at a healthy weight, overweight, or obese. It measures your weight in relation to your height.  A BMI of 18.5 to 24.9 is in the healthy range. A person with a BMI of 25 to 29.9 is considered overweight, and someone with a BMI of 30 or greater is considered obese. More than two-thirds of American adults are considered overweight or obese.  Being overweight or obese increases the risk for further weight gain. Excess weight may lead to heart disease and diabetes.  Creating and following plans for healthy eating and physical activity may help you improve your health.  Weight control is part of healthy lifestyle and includes exercise, emotional health, and healthy eating habits. Careful eating habits lifelong are the mainstay of weight control. Though there are significant health benefits from weight loss, long-term weight loss with diet alone may be very difficult to achieve- studies show long-term success with dietary management in less than 10% of people. Attaining a healthy weight may be especially difficult to achieve in those with severe obesity. In some cases, medications, devices and surgical management might be considered.  What can you do?  If you are overweight or obese and are interested in methods for weight loss, you should discuss this with your provider.     Consider reducing daily calorie intake by 500 calories.     Keep a food journal.     Avoiding skipping meals, consider cutting portions instead.    Diet combined with exercise helps maintain muscle while  optimizing fat loss. Strength training is particularly important for building and maintaining muscle mass. Exercise helps reduce stress, increase energy, and improves fitness. Increasing exercise without diet control, however, may not burn enough calories to loose weight.       Start walking three days a week 10-20 minutes at a time    Work towards walking thirty minutes five days a week     Eventually, increase the speed of your walking for 1-2 minutes at time    In addition, we recommend that you review healthy lifestyles and methods for weight loss available through the National Institutes of Health patient information sites:  http://win.niddk.nih.gov/publications/index.htm    And look into health and wellness programs that may be available through your health insurance provider, employer, local community center, or markel club.    Weight management plan: Patient was referred to their PCP to discuss a diet and exercise plan.

## 2018-09-11 NOTE — PROGRESS NOTES
Sleep Consultation:    Date on this visit: 9/11/2018    Primary Physician: Oliva Lovell     Chief complaint: snoring, excessive daytime sleepiness    Presenting History:     Ana Dupree reports nightly snoring, poor quality of sleep and excessive daytime sleepiness for many years.     Patient had a previous PSG in 2009. A full report is not available. Interpretation report from Dr. Guallpa indicates severe KINGS with a respiratory disturbance index of 82 per hour. She was prescribed CPAP therapy    Ana does snore every night. Patient does not have a regular bed partner. There is report of snoring.  She does have witnessed apneas.  Patient sleeps on her back. She has frequent morning dry mouth, denies no morning headaches and restless legs. Ana denies any bruxism, sleep walking, sleep talking, dream enactment, sleep paralysis, cataplexy and hypnogogic/hypnopompic hallucinations.    Ana goes to sleep at 9:00 PM during the week. She wakes up at 5:30 AM without an alarm. She falls asleep in 10 minutes.  Ana denies difficulty falling asleep.  She wakes up 2-3 times a night for 30 minutes before falling back to sleep.  Ana wakes up to uncertain reasons and an active mind.  On weekends, Ana goes to sleep at 12:00 AM.  She wakes up at 8:00 AM without an alarm. She falls asleep in 10 minutes.  Patient gets an average of 6 hours of sleep per night.     Patient does not use electronics in bed.     Ana does not do shift work.  She works day shifts.       She denies sleep walking as a child.  Ana denies difficulty breathing through her nose.       Patient's [place] Sleepiness score 12/24 consistent with mild daytime sleepiness.      Ana naps 2 times per week for  minutes, feels refreshed after naps. She takes some inadvertant naps.  She denies closing eyes, dozing and falling asleep while driving. Patient was counseled on the importance of driving while alert, to pull over if drowsy,  or nap before getting into the vehicle if sleepy.      She uses 2 cups/day of coffee. Last caffeine intake is usually before 10 am.    Allergies:    No Known Allergies    Medications:    Current Outpatient Prescriptions   Medication Sig Dispense Refill     calcium carb 1250 mg, 500 mg [place],/vitamin D 200 units (OSCAL WITH D) 500-200 MG-UNIT per tablet Take 2 tablets by mouth every 12 hours 100 tablet 0     Cholecalciferol (VITAMIN D3 PO) Take 2,000 Units by mouth daily       lisinopril (PRINIVIL/ZESTRIL) 20 MG tablet TAKE 1 TABLET(20 MG) BY MOUTH DAILY 90 tablet 3     Omega-3 Fatty Acids (FISH OIL PO)        VITAMIN D, CHOLECALCIFEROL, PO Take by mouth daily         Problem List:  Patient Active Problem List    Diagnosis Date Noted     Morbid obesity (H) 02/07/2018     Priority: Medium     Essential hypertension 11/25/2016     Priority: Medium     Osteoporosis, senile 11/25/2016     Priority: Medium        Past Medical/Surgical History:  Past Medical History:   Diagnosis Date     Essential hypertension 11/25/2016     Hyperlipidemia     elevated triglycerides     Hyperparathyroidism (H) 06/2014    referred to endo when PTH/calcium still high 11/14 and 12/14. seeing Dr. leos. dexa wtih penia at hips and porosis wrist. disc. surgery 12/14     Hypertension      Morbid obesity (H)      Osteoporosis     osteopenia at hips and osteoporosis at wrist according to dexa done through endo referral     Osteoporosis, senile 11/25/2016     Renal stones      Situational depression      Sleep apnea 2009    Doesn't use a CPAP     Past Surgical History:   Procedure Laterality Date     BIOPSY BREAST  11/2001, 07/07/2014    excisional left side-B9 fibrocystic change. stereotactic bx left at 2 oclock for calcs. path-atypical ductal proliferation     COLONOSCOPY N/A 1/19/2017    Procedure: COMBINED COLONOSCOPY, SINGLE OR MULTIPLE BIOPSY/POLYPECTOMY BY BIOPSY;  Surgeon: Jerald Grant MD;  Location:  GI     LITHOTRIPSY  2007      MAMMOPLASTY REDUCTION  11/07/2014    Dr. Trujillo and Reji. had atypical cells noted on a bx but also wanted reduction so done together.     PARATHYROIDECTOMY Right 8/21/2015    Procedure: PARATHYROIDECTOMY;  Surgeon: Lynnette Sewell MD;  Location: [facility] OR     TUBAL LIGATION  12/14/1994    post partum       Social History:  Social History     Social History     Marital status:      Spouse name: Jim (age 47 [place], 2003)     Number of children: 3     Years of education: N/A     Occupational History     Paraprofessional (substituting)      Social History Main Topics     Smoking status: Never Smoker     Smokeless tobacco: Never Used     Alcohol use No     Drug use: No     Sexual activity: No      Comment: Tubal Ligation 1994     Other Topics Concern     Not on file     Social History Narrative       Family History:    Brother has sleep apnea.     Family History   Problem Relation Age of Onset     Hypertension Mother      Cerebrovascular Disease Mother      Colon Cancer Father 90     Other Cancer Maternal Grandmother      Cerebrovascular Disease Paternal Grandmother      Breast Cancer Maternal Grandmother      Diabetes Mother      Hyperlipidemia Mother      Coronary Artery Disease Mother        Review of Systems:  A complete review of systems reviewed by me is negative with the exeption of what has been mentioned in the history of present illness.  CONSTITUTIONAL:  POSITIVE for  weight gain  EYES: NEGATIVE for changes in vision, blind spots, double vision.  ENT: NEGATIVE for ear pain, sore throat, sinus pain, post-nasal drip, runny nose, bloody nose  CARDIAC: NEGATIVE for fast heartbeats or fluttering in chest, chest pain or pressure, breathlessness when lying flat, swollen legs or swollen feet.  NEUROLOGIC: NEGATIVE headaches, weakness or numbness in the arms or legs.  DERMATOLOGIC: NEGATIVE for rashes, new moles or change in mole(s)  PULMONARY:  POSITIVE for  SOB with activity  GASTROINTESTINAL: NEGATIVE for  nausea or vomitting, loose or watery stools, fat or grease in stools, constipation, abdominal pain, bowel movements black in color or blood noted.  GENITOURINARY: NEGATIVE for pain during urination, blood in urine, urinating more frequently than usual, irregular menstrual periods.  MUSCULOSKELETAL: NEGATIVE for muscle pain, bone or joint pain, swollen joints.  ENDOCRINE: NEGATIVE for increased thirst or urination, diabetes.  LYMPHATIC: NEGATIVE for swollen lymph nodes, lumps or bumps in the breasts or nipple discharge.    Physical Examination:  Vitals: /59  Pulse 55  Temp 98.5  F (36.9  C) (Oral)  Resp 16  Ht 1.524 m (5')  Wt 96.7 kg (213 lb 3.2 oz)  SpO2 98%  BMI 41.64 kg/m2  BMI= Body mass index is 41.64 kg/(m^2).    Neck Cir (cm): 35 cm    [place] Total Score 9/11/2018   Total score - Playas 12       ANTOINETTE Total Score: 16 (09/11/18 1528)    GENERAL APPEARANCE: healthy, alert and no distress  EYES: Eyes grossly normal to inspection, PERRL and conjunctivae and sclerae normal  HENT: nose and mouth without ulcers or lesions, oropharynx crowded, soft palate dependent and tongue base enlarged  NECK: no adenopathy, no asymmetry, masses, or scars and thyroid normal to palpation  RESP: lungs clear to auscultation - no rales, rhonchi or wheezes  CV: regular rates and rhythm, normal S1 S2, no S3 or S4 and no murmur, click or rub  MS: extremities normal- no gross deformities noted  NEURO: Normal strength and tone, mentation intact and speech normal  PSYCH: mentation appears normal and affect normal/bright  Mallampati Class: IV.  Tonsillar Stage: 1  hidden by pillars.    Impression/Plan:    1. Obstructive sleep apnea  2. Hypertension   3. Excessive daytime sleepiness     - Patient had severe KINGS with an RDI of 81 per hour in 2009. She did not tolerate CPAP and was not compliant with CPAP. She doesot have a bed partner but is reported to have loud snoring and complains of daytime sleepiness.  There has not been  significant weight change since sleep study. She is likely to have significant sleep disordered breathing and CPAP therapy will be the treatment of choice. A reevaluation is recommended to assess current severity of sleep apnea and a home sleep apnea test can be appropriate. We will plan treatment after sleep study.     Plan:     1. Home sleep apnea test       She will follow up with me in approximately two weeks after her sleep study has been competed to review the results and discuss plan of care.       Polysomnography reviewed.  Obstructive sleep apnea reviewed.  Complications of untreated sleep apnea were reviewed.    I spent a total of 45 minutes with patient with more than 50% in counseling     Khai Mohr     CC: No ref. provider found

## 2018-09-11 NOTE — MR AVS SNAPSHOT
After Visit Summary   9/11/2018    Ana Dupree    MRN: [id number]           Patient Information     Date Of Birth          1955        Visit Information        Provider Department      9/11/2018 3:30 PM Khai Mohr MD [facility]        Today's Diagnoses     KINGS (obstructive sleep apnea)    -  1    Morbid obesity (H)        Snoring        History of sleep apnea        Excessive daytime sleepiness          Care Instructions    1. Obstructive sleep apnea    - Sleep study from 2009 showed severe sleep apnea  - Home sleep test to reassess sleep apnea  - Follow up after sleep study to plan management     Your BMI is Body mass index is 41.64 kg/(m^2).  Weight management is a personal decision.  If you are interested in exploring weight loss strategies, the following discussion covers the approaches that may be successful. Body mass index (BMI) is one way to tell whether you are at a healthy weight, overweight, or obese. It measures your weight in relation to your height.  A BMI of 18.5 to 24.9 is in the healthy range. A person with a BMI of 25 to 29.9 is considered overweight, and someone with a BMI of 30 or greater is considered obese. More than two-thirds of American adults are considered overweight or obese.  Being overweight or obese increases the risk for further weight gain. Excess weight may lead to heart disease and diabetes.  Creating and following plans for healthy eating and physical activity may help you improve your health.  Weight control is part of healthy lifestyle and includes exercise, emotional health, and healthy eating habits. Careful eating habits lifelong are the mainstay of weight control. Though there are significant health benefits from weight loss, long-term weight loss with diet alone may be very difficult to achieve- studies show long-term success with dietary management in less than 10% of people. Attaining a healthy weight may be especially  difficult to achieve in those with severe obesity. In some cases, medications, devices and surgical management might be considered.  What can you do?  If you are overweight or obese and are interested in methods for weight loss, you should discuss this with your provider.     Consider reducing daily calorie intake by 500 calories.     Keep a food journal.     Avoiding skipping meals, consider cutting portions instead.    Diet combined with exercise helps maintain muscle while optimizing fat loss. Strength training is particularly important for building and maintaining muscle mass. Exercise helps reduce stress, increase energy, and improves fitness. Increasing exercise without diet control, however, may not burn enough calories to loose weight.       Start walking three days a week 10-20 minutes at a time    Work towards walking thirty minutes five days a week     Eventually, increase the speed of your walking for 1-2 minutes at time    In addition, we recommend that you review healthy lifestyles and methods for weight loss available through the National Institutes of Health patient information sites:  http://win.niddk.nih.gov/publications/index.htm    And look into health and wellness programs that may be available through your health insurance provider, employer, local community center, or markel club.    Weight management plan: Patient was referred to their PCP to discuss a diet and exercise plan.            Follow-ups after your visit        Your next 10 appointments already scheduled     Sep 18, 2018  3:30 PM CDT   HST  with BED 7 SH SLEEP   [facility] ([facility])    [address]  [facility] [address]   [phone]            Sep 19, 2018  9:30 AM CDT   HST Drop Off with BED 7 SH SLEEP   [facility] ([facility])    [address] [address]   [phone]            Sep 24, 2018  " 4:00 PM CDT   Return Sleep Patient with Khai Mohr MD   [facility] ([facility])    [address] [id number] [phone]              Future tests that were ordered for you today     Open Future Orders        Priority Expected Expires Ordered    HST-Home Sleep Apnea Test Routine  3/13/2019 2018            Who to contact     If you have questions or need follow up information about today's clinic visit or your schedule please contact [facility] directly at [phone].  Normal or non-critical lab and imaging results will be communicated to you by On The Run Techhart, letter or phone within 4 business days after the clinic has received the results. If you do not hear from us within 7 days, please contact the clinic through [employer] or phone. If you have a critical or abnormal lab result, we will notify you by phone as soon as possible.  Submit refill requests through VNG or call your pharmacy and they will forward the refill request to us. Please allow 3 business days for your refill to be completed.          Additional Information About Your Visit        On The Run Techhart Information     VNG lets you send messages to your doctor, view your test results, renew your prescriptions, schedule appointments and more. To sign up, go to [email or web address] . Click on \"Log in\" on the left side of the screen, which will take you to the Welcome page. Then click on \"Sign up Now\" on the right side of the page.     You will be asked to enter the access code listed below, as well as some personal information. Please follow the directions to create your username and password.     Your access code is: [email or web address]  Expires: 12/10/2018  4:28 PM     Your access code will  in 90 days. If you need help or a new code, please call your [place] clinic or [phone].        Care EveryWhere ID     This is your Care EveryWhere ID. This could be used " by other organizations to access your [facility] medical records  [id number]        Your Vitals Were     Pulse Temperature Respirations Height Pulse Oximetry BMI (Body Mass Index)    55 98.5  F (36.9  C) (Oral) 16 1.524 m (5') 98% 41.64 kg/m2       Blood Pressure from Last 3 Encounters:   09/11/18 104/59   07/16/18 129/85   02/07/18 110/70    Weight from Last 3 Encounters:   09/11/18 96.7 kg (213 lb 3.2 oz)   07/16/18 90.7 kg (200 lb)   02/07/18 96.2 kg (212 lb)              We Performed the Following     SLEEP EVALUATION & MANAGEMENT REFERRAL - ADULT -[facility] [phone]  (Age 18 and up)        Primary Care Provider Office Phone # Fax #    Oliva Lovell [phone] [address] [address]        Equal Access to Services     CHEY PIKE : Hadii aad ku hadasho Soomaali, waaxda luqadaha, qaybta kaalmada adeegyada, waxay idiin hayperla lozoya . So [place] [phone].    ATENCIÓN: Si habla español, tiene a cottrell disposición servicios gratuitos de asistencia lingüística. [facility] [phone].    We comply with applicable federal civil rights laws and [place] laws. We do not discriminate on the basis of race, color, national origin, age, disability, sex, sexual orientation, or gender identity.            Thank you!     Thank you for choosing [place] [place] [facility]  for your care. Our goal is always to provide you with excellent care. Hearing back from our patients is one way we can continue to improve our services. Please take a few minutes to complete the written survey that you may receive in the mail after your visit with us. Thank you!             Your Updated Medication List - Protect others around you: Learn how to safely use, store and throw away your medicines at www.disposemymeds.org.          This list is accurate as of 9/11/18  4:32 PM.  Always use your most recent med list.                   Brand Name Dispense  Instructions for use Diagnosis    calcium carbonate 500 mg-vitamin D 200 units 500-200 MG-UNIT per tablet    OSCAL with D;OYSTER SHELL CALCIUM    100 tablet    Take 2 tablets by mouth every 12 hours    Primary hyperparathyroidism (H)       FISH OIL PO           lisinopril 20 MG tablet    PRINIVIL/ZESTRIL    90 tablet    TAKE 1 TABLET(20 MG) BY MOUTH DAILY    Essential hypertension       * VITAMIN D3 PO      Take 2,000 Units by mouth daily        * VITAMIN D (CHOLECALCIFEROL) PO      Take by mouth daily        * Notice:  This list has 2 medication(s) that are the same as other medications prescribed for you. Read the directions carefully, and ask your doctor or other care provider to review them with you.

## 2018-09-11 NOTE — NURSING NOTE
"Chief Complaint   Patient presents with     Sleep Problem     \"Waking up during the night, tossing trying to get back to sleep. Tired during the day - snoring.\"       Initial /59  Pulse 55  Temp 98.5  F (36.9  C) (Oral)  Resp 16  Ht 1.524 m (5')  Wt 96.7 kg (213 lb 3.2 oz)  SpO2 98%  BMI 41.64 kg/m2 Estimated body mass index is 41.64 kg/(m^2) as calculated from the following:    Height as of this encounter: 1.524 m (5').    Weight as of this encounter: 96.7 kg (213 lb 3.2 oz).    Medication Reconciliation: complete     ESS 12  Neck 35cm  Rhona Foley        "

## 2018-09-18 ENCOUNTER — OFFICE VISIT (OUTPATIENT)
Dept: SLEEP MEDICINE | Facility: CLINIC | Age: 63
End: 2018-09-18
Attending: INTERNAL MEDICINE
Payer: COMMERCIAL

## 2018-09-18 DIAGNOSIS — G47.19 EXCESSIVE DAYTIME SLEEPINESS: ICD-10-CM

## 2018-09-18 DIAGNOSIS — G47.33 OSA (OBSTRUCTIVE SLEEP APNEA): ICD-10-CM

## 2018-09-18 PROCEDURE — G0399 HOME SLEEP TEST/TYPE 3 PORTA: HCPCS | Performed by: INTERNAL MEDICINE

## 2018-09-18 NOTE — MR AVS SNAPSHOT
"              After Visit Summary   9/18/2018    Ana Dupree    MRN: [id number]           Patient Information     Date Of Birth          1955        Visit Information        Provider Department      9/18/2018 1:30 PM BED 7 SH SLEEP [facility]        Today's Diagnoses     Excessive daytime sleepiness        KINGS (obstructive sleep apnea)           Follow-ups after your visit        Your next 10 appointments already scheduled     Sep 19, 2018  9:30 AM CDT   HST Drop Off with BED 7 SH SLEEP   [facility] ([facility])    [address]  [facility] [id number] [phone]            Sep 24, 2018  4:00 PM CDT   Return Sleep Patient with Khai Mohr MD   [facility] ([facility])    [address]  [facility] [id number] [phone]              Who to contact     If you have questions or need follow up information about today's clinic visit or your schedule please contact [facility] directly at [phone].  Normal or non-critical lab and imaging results will be communicated to you by Tivixhart, letter or phone within 4 business days after the clinic has received the results. If you do not hear from us within 7 days, please contact the clinic through [employer] or phone. If you have a critical or abnormal lab result, we will notify you by phone as soon as possible.  Submit refill requests through [email or web address] or call your pharmacy and they will forward the refill request to us. Please allow 3 business days for your refill to be completed.          Additional Information About Your Visit        Tivix[employer] Information     [email or web address] lets you send messages to your doctor, view your test results, renew your prescriptions, schedule appointments and more. To sign up, go to [email or web address] . Click on \"Log in\" on the left side of the screen, which will take you to the Welcome " "page. Then click on \"Sign up Now\" on the right side of the page.     You will be asked to enter the access code listed below, as well as some personal information. Please follow the directions to create your username and password.     Your access code is: [email or web address]  Expires: 12/10/2018  4:28 PM     Your access code will  in 90 days. If you need help or a new code, please call your [place] clinic or [phone].        Care EveryWhere ID     This is your Care EveryWhere ID. This could be used by other organizations to access your [place] medical records  [id number]         Blood Pressure from Last 3 Encounters:   18 104/59   18 129/85   18 110/70    Weight from Last 3 Encounters:   18 96.7 kg (213 lb 3.2 oz)   18 90.7 kg (200 lb)   18 96.2 kg (212 lb)              We Performed the Following     HST-Home Sleep Apnea Test        Primary Care Provider Office Phone # Fax #    Oliva Lovell [phone] [address] [address]        Equal Access to Services     NILDA PIKE : Hadii travis ruano haddaxao Sobilly, waaxda luqadaha, qaybta kaalmada adeegyada, go lozoya . So [facility] [phone].    ATENCIÓN: Si habla español, tiene a cottrell disposición servicios gratuitos de asistencia lingüística. Llame al [phone].    We comply with applicable federal civil rights laws and [place] laws. We do not discriminate on the basis of race, color, national origin, age, disability, sex, sexual orientation, or gender identity.            Thank you!     Thank you for choosing [place] [facility]  for your care. Our goal is always to provide you with excellent care. Hearing back from our patients is one way we can continue to improve our services. Please take a few minutes to complete the written survey that you may receive in the mail after your visit with us. Thank you!             Your Updated Medication List - " Protect others around you: Learn how to safely use, store and throw away your medicines at www.disposemymeds.org.          This list is accurate as of 9/18/18 11:59 PM.  Always use your most recent med list.                   Brand Name Dispense Instructions for use Diagnosis    calcium carbonate 500 mg-vitamin D 200 units 500-200 MG-UNIT per tablet    OSCAL with D;OYSTER SHELL CALCIUM    100 tablet    Take 2 tablets by mouth every 12 hours    Primary hyperparathyroidism (H)       FISH OIL PO           lisinopril 20 MG tablet    PRINIVIL/ZESTRIL    90 tablet    TAKE 1 TABLET(20 MG) BY MOUTH DAILY    Essential hypertension       * VITAMIN D3 PO      Take 2,000 Units by mouth daily        * VITAMIN D (CHOLECALCIFEROL) PO      Take by mouth daily        * Notice:  This list has 2 medication(s) that are the same as other medications prescribed for you. Read the directions carefully, and ask your doctor or other care provider to review them with you.

## 2018-09-19 ENCOUNTER — DOCUMENTATION ONLY (OUTPATIENT)
Dept: SLEEP MEDICINE | Facility: CLINIC | Age: 63
End: 2018-09-19
Payer: COMMERCIAL

## 2018-09-20 NOTE — PROCEDURES
HOME SLEEP STUDY INTERPRETATION    Patient: Ana Dupree  MRN: [id number]  YOB: 1955  Study Date: 2018  Referring Provider: Oliva Lovell;   Ordering Provider: Khai Mohr MD, MD     Indications for Home Study: Ana Dupree is a 63 year old female with a history of hypertension who presents with symptoms suggestive of obstructive sleep apnea.    Estimated body mass index is 41.64 kg/(m^2) as calculated from the following:    Height as of 18: 1.524 m (5').    Weight as of 18: 96.7 kg (213 lb 3.2 oz).  Total score - [place]: 12 (2018  3:28 PM)  STOP-BAN/8    Data: A full night home sleep study was performed recording the standard physiologic parameters including body position, movement, sound, nasal pressure, thermal oral airflow, chest and abdominal movements with respiratory inductance plethysmography, and oxygen saturation by pulse oximetry. Pulse rate was estimated by oximetry recording. This study was considered adequate based on > 4 hours of quality oximetry and respiratory recording. As specified by the AASM Manual for the Scoring of Sleep and Associated events, version 2.3, Rule VIII.D 1B, 4% oxygen desaturation scoring for hypopneas is used as a standard of care on all home sleep apnea testing.    Analysis Time:  437.9 minutes    Respiration:   Sleep Associated Hypoxemia: sustained hypoxemia was present. Baseline oxygen saturation was 92.3%.  Time with saturation less than or equal to 88% was 6.6 minutes. The lowest oxygen saturation was 80%.   Snoring: Snoring was present.  Respiratory events: The home study revealed a presence of 4 obstructive apneas and 0 mixed and 2 central apneas. There were 41 hypopneas resulting in a combined apnea/hypopnea index [AHI] of 6.4 events per hour.  AHI was 6.6 per hour supine, - per hour prone, 0 per hour on left side, and 0 per hour on right side.   Pattern: Excluding events noted above, respiratory rate and pattern was  Normal.    Position: Percent of time spent: supine - 96.9%, prone - 0%, on left - 0.7%, on right - 2.2%.    Heart Rate: By pulse oximetry normal rate was noted.     Assessment:   Mild obstructive sleep apnea.  Sleep associated hypoxemia was present.    Recommendations:  Depending on clinical indications for treatment of mild KINGS and medical comorbidity, auto-CPAP at 5-15 cmH2O or oral appliance therapy are treatment considerations.  Suggest optimizing sleep hygiene and avoiding sleep deprivation.  Weight management.    Diagnosis Code(s): Obstructive Sleep Apnea G47.33, Hypoxemia G47.36    Khai Mohr MD, MD, September 20, 2018   Diplomate, American Board of Psychiatry and Neurology, Sleep Medicine

## 2018-09-20 NOTE — PROGRESS NOTES
This HSAT was performed using a Noxturnal T3 device which recorded snore, sound, movement activity, body position, nasal pressure, oronasal thermal airflow, pulse, oximetry and both chest and abdominal respiratory effort. HSAT data was restricted to the time patient states they were in bed.     HSAT was scored using 1B 4% hypopnea rule.     HST AHI (Non-PAT): 6.4  Snoring was reported as loud.  Time with SpO2 below 89% was 6.6 minutes.   Overall signal quality was good.     Pt will follow up with sleep provider to determine appropriate therapy.

## 2018-09-24 ENCOUNTER — OFFICE VISIT (OUTPATIENT)
Dept: SLEEP MEDICINE | Facility: CLINIC | Age: 63
End: 2018-09-24
Payer: COMMERCIAL

## 2018-09-24 VITALS
SYSTOLIC BLOOD PRESSURE: 109 MMHG | HEART RATE: 61 BPM | TEMPERATURE: 97.9 F | OXYGEN SATURATION: 97 % | DIASTOLIC BLOOD PRESSURE: 70 MMHG | BODY MASS INDEX: 42.21 KG/M2 | WEIGHT: 215 LBS | HEIGHT: 60 IN | RESPIRATION RATE: 16 BRPM

## 2018-09-24 DIAGNOSIS — G47.33 OSA (OBSTRUCTIVE SLEEP APNEA): Primary | ICD-10-CM

## 2018-09-24 PROCEDURE — 99213 OFFICE O/P EST LOW 20 MIN: CPT | Performed by: INTERNAL MEDICINE

## 2018-09-24 NOTE — NURSING NOTE
Chief Complaint   Patient presents with     Study Results     HST f/u       Initial /70  Pulse 61  Temp 97.9  F (36.6  C) (Oral)  Resp 16  Ht 1.524 m (5')  Wt 97.5 kg (215 lb)  SpO2 97%  BMI 41.99 kg/m2 Estimated body mass index is 41.99 kg/(m^2) as calculated from the following:    Height as of this encounter: 1.524 m (5').    Weight as of this encounter: 97.5 kg (215 lb).    Medication Reconciliation: complete     ESS 10  Rhona Foley

## 2018-09-24 NOTE — MR AVS SNAPSHOT
After Visit Summary   9/24/2018    Ana Dupree    MRN: [id number]           Patient Information     Date Of Birth          1955        Visit Information        Provider Department      9/24/2018 4:00 PM Khai Mohr MD [facility] [place]        Today's Diagnoses     KINGS (obstructive sleep apnea)    -  1      Care Instructions      Your BMI is Body mass index is 41.99 kg/(m^2).  Weight management is a personal decision.  If you are interested in exploring weight loss strategies, the following discussion covers the approaches that may be successful. Body mass index (BMI) is one way to tell whether you are at a healthy weight, overweight, or obese. It measures your weight in relation to your height.  A BMI of 18.5 to 24.9 is in the healthy range. A person with a BMI of 25 to 29.9 is considered overweight, and someone with a BMI of 30 or greater is considered obese. More than two-thirds of American adults are considered overweight or obese.  Being overweight or obese increases the risk for further weight gain. Excess weight may lead to heart disease and diabetes.  Creating and following plans for healthy eating and physical activity may help you improve your health.  Weight control is part of healthy lifestyle and includes exercise, emotional health, and healthy eating habits. Careful eating habits lifelong are the mainstay of weight control. Though there are significant health benefits from weight loss, long-term weight loss with diet alone may be very difficult to achieve- studies show long-term success with dietary management in less than 10% of people. Attaining a healthy weight may be especially difficult to achieve in those with severe obesity. In some cases, medications, devices and surgical management might be considered.  What can you do?  If you are overweight or obese and are interested in methods for weight loss, you should discuss this with your provider.     Consider  reducing daily calorie intake by 500 calories.     Keep a food journal.     Avoiding skipping meals, consider cutting portions instead.    Diet combined with exercise helps maintain muscle while optimizing fat loss. Strength training is particularly important for building and maintaining muscle mass. Exercise helps reduce stress, increase energy, and improves fitness. Increasing exercise without diet control, however, may not burn enough calories to loose weight.       Start walking three days a week 10-20 minutes at a time    Work towards walking thirty minutes five days a week     Eventually, increase the speed of your walking for 1-2 minutes at time    In addition, we recommend that you review healthy lifestyles and methods for weight loss available through the National Institutes of Health patient information sites:  http://win.niddk.nih.gov/publications/index.htm    And look into health and wellness programs that may be available through your health insurance provider, employer, local community center, or markel club.    Weight management plan: Patient was referred to their PCP to discuss a diet and exercise plan.            Follow-ups after your visit        Additional Services     SLEEP DENTAL REFERRAL       Dental appliance resources recommended by [facility]     Below is a list of dental appliance resources recommended by [facility]   If you wish to choose your own dental sleep dentist, you may identify a provider close to you: [email or web address]    [facility]   Sleep Medicine [facility]   Melquiades Mohr DDS, [place] [address]   Appointments: [phone]  Fax: [phone]   Email: [email or web address]   [facility]   Bruce Cassidy, KVNG Champagne DDS   [address] [place], [place] [address],  [address]   [place] [address]   Appointments: [phone]   Website: [email or web address]    Snoring and Sleep Apnea   [facility] Center   JAYNA Ross DDS  [address], [address]   [place] [address]   Appointments: [phone]   Fax: [phone]   Email: [email or web address]  Website: [employer]     [facility]   Office 1   Jaleel Blanton DDS - [DME Medicare]  [address], [address]   [place] [address]  Appointments: [phone]  Fax: [phone]  Website: [employer]    [facility]   Office 2  Jaleel Blanton DDS - [DME Medicare]  [address]  [place] [address]  Appointments: [phone]  Fax: [phone]  Website: [employer]    [facility]  Steve Serna DDS  [address] [place] [address]  Appointments: [phone]    [facility]   [place] Office   Antony Champagne DDS   [facility]   [address], [address]   [place] [address]   Appointments: [phone]   Fax: [phone]   Website: [place]     [facility]   [place] Office   Remington Chavez DDS, MS - [DME Medicare]  [facility]   [address], [address]   [place] [address]   Appointments: [phone]   Fax: [phone]   Website: [place]     Abdiaziz Your Opal Peters DMD, MSD - [DME Medicare]  [address], [address]  [place] [address]  Appointments [phone]  Fax: [phone]  Website: [facility]    [facility]   [place] Office   Rosemary Pelletier DDS, PhD, MS   [facility]   [address] [place] [address], [address]   [place] [address]   Appointments: [phone]   Fax: [phone]   Website: [email or web address]     [facility]   [place] Office   Rosemary Pelletier DDS, PhD, MS    [facility]   [address] [place] [address], [address]   [place] [address]   Appointments: [phone]   Fax: [phone]   Website: [facility]     [facility]  Sophie Mosqueda DDS  [facility]  [address] [place] [address]  Appointments: [phone]   Fax: [phone]    If you wish to choose your own dental sleep dentist, you may identify a provider close to you: [email or web address]      AHI: 6.4 hst DATE: 9/18/2018     Return to clinic in 6 months for Home Sleep Test to confirm adequacy of Treatment.    Other information regarding this referral: Mandibular repositioning appliance for KINGS. If your insurance asks for a CPT code, it is .    Please be aware that coverage of these services is subject to the terms and limitations of your health insurance plan.  Call member services at your health plan with any benefit or coverage questions.      Please bring the following to your appointment:    >>   List of current medications   >>   This referral request   >>   Any documents/labs given to you for this referral                  Who to contact     If you have questions or need follow up information about today's clinic visit or your schedule please contact [facility] directly at [phone].  Normal or non-critical lab and imaging results will be communicated to you by MyChart, letter or phone within 4 business days after the clinic has received the results. If you do not hear from us within 7 days, please contact the clinic through [email or web address] or phone. If you have a critical or abnormal lab result, we will notify you by phone as soon as possible.  Submit refill requests through [employer] or call your pharmacy and they will forward the refill request to us. Please allow 3 business days for your refill to be completed.          Additional Information About Your Visit        MyChart Information      "[employer] lets you send messages to your doctor, view your test results, renew your prescriptions, schedule appointments and more. To sign up, go to [email or web address] . Click on \"Log in\" on the left side of the screen, which will take you to the Welcome page. Then click on \"Sign up Now\" on the right side of the page.     You will be asked to enter the access code listed below, as well as some personal information. Please follow the directions to create your username and password.     Your access code is: [email or web address]  Expires: 12/10/2018  4:28 PM     Your access code will  in 90 days. If you need help or a new code, please call your [place] clinic or [phone].        Care EveryWhere ID     This is your [facility] EveryWhere ID. This could be used by other organizations to access your [place] medical records  [id number]        Your Vitals Were     Pulse Temperature Respirations Height Pulse Oximetry BMI (Body Mass Index)    61 97.9  F (36.6  C) (Oral) 16 1.524 m (5') 97% 41.99 kg/m2       Blood Pressure from Last 3 Encounters:   18 109/70   18 104/59   18 129/85    Weight from Last 3 Encounters:   18 97.5 kg (215 lb)   18 96.7 kg (213 lb 3.2 oz)   18 90.7 kg (200 lb)              We Performed the Following     SLEEP DENTAL REFERRAL        Primary Care Provider Office Phone # Fax #    Oliva Lovell [phone] [address]  [facility] [address]        Equal Access to Services     NILDA PIKE : Hadii travis Ibarra, nael torres, qaeloisa nerialgo daly. So [facility] [phone].    ATENCIÓN: Si habla español, tiene a cottrell disposición servicios gratuitos de asistencia lingüística. Llame al [phone].    We comply with applicable federal civil rights laws and [place] laws. We do not discriminate on the basis of race, color, national origin, age, disability, sex, sexual orientation, or " gender identity.            Thank you!     Thank you for choosing [facility]  for your care. Our goal is always to provide you with excellent care. Hearing back from our patients is one way we can continue to improve our services. Please take a few minutes to complete the written survey that you may receive in the mail after your visit with us. Thank you!             Your Updated Medication List - Protect others around you: Learn how to safely use, store and throw away your medicines at www.disposemymeds.org.          This list is accurate as of 9/24/18  4:32 PM.  Always use your most recent med list.                   Brand Name Dispense Instructions for use Diagnosis    calcium carbonate 500 mg-vitamin D 200 units 500-200 MG-UNIT per tablet    OSCAL with D;OYSTER SHELL CALCIUM    100 tablet    Take 2 tablets by mouth every 12 hours    Primary hyperparathyroidism (H)       FISH OIL PO           lisinopril 20 MG tablet    PRINIVIL/ZESTRIL    90 tablet    TAKE 1 TABLET(20 MG) BY MOUTH DAILY    Essential hypertension       * VITAMIN D3 PO      Take 2,000 Units by mouth daily        * VITAMIN D (CHOLECALCIFEROL) PO      Take by mouth daily        * Notice:  This list has 2 medication(s) that are the same as other medications prescribed for you. Read the directions carefully, and ask your doctor or other care provider to review them with you.

## 2018-09-24 NOTE — PROGRESS NOTES
Sleep Study Follow-Up Visit:    Date : 9/24/2018    Ana Dupree comes in today for follow-up of her hoem sleep study done on 9/18/2018 at the [facility] [place] for possible sleep apnea.    Respiratory events: The home study revealed a presence of 4 obstructive apneas and 0 mixed and 2 central apneas. There were 41 hypopneas resulting in a combined apnea/hypopnea index [AHI] of 6.4 events per hour.  AHI was 6.6 per hour supine, - per hour prone, 0 per hour on left side, and 0 per hour on right side.   Pattern: Excluding events noted above, respiratory rate and pattern was Normal.    Sleep Associated Hypoxemia: sustained hypoxemia was present. Baseline oxygen saturation was 92.3%.  Time with saturation less than or equal to 88% was 6.6 minutes. The lowest oxygen saturation was 80%.   Snoring: Snoring was present.     Position: Percent of time spent: supine - 96.9%, prone - 0%, on left - 0.7%, on right - 2.2%.     Heart Rate: By pulse oximetry normal rate was noted.      Assessment:   Mild obstructive sleep apnea.  Sleep associated hypoxemia was present.    These findings were reviewed with patient. We compared to previous sleep study. Considering majority of night in supine position and fairly good quality study, I do not think there could be significant underestimation.     Past medical/surgical history, family history, social history, medications and allergies were reviewed.      Problem List:  Patient Active Problem List    Diagnosis Date Noted     Morbid obesity (H) 02/07/2018     Priority: Medium     Essential hypertension 11/25/2016     Priority: Medium     Osteoporosis, senile 11/25/2016     Priority: Medium        Impression/Plan:    1. Mild KINGS    - We discussed home sleep test result in detail. Management option were reviewed including auto PAP and oral appliance. Patent was previously tolerate CPAP and declines PAP therapy. I provided a list of dentals sleep specialists that she  can consult for oral appliance therapy.     Plan:     1. Dental sleep medicine referral for KINGS     Fifteen minutes spent with patient, all of which were spent face-to-face counseling, consulting, coordinating plan of care.      Dr. Khai Mohr     CC: Oliva Lovell

## 2019-02-15 DIAGNOSIS — I10 ESSENTIAL HYPERTENSION: ICD-10-CM

## 2019-02-15 RX ORDER — LISINOPRIL 20 MG/1
TABLET ORAL
Qty: 30 TABLET | Refills: 0 | Status: SHIPPED | OUTPATIENT
Start: 2019-02-15 | End: 2019-03-18

## 2019-02-15 NOTE — TELEPHONE ENCOUNTER
"Requested Prescriptions   Pending Prescriptions Disp Refills     lisinopril (PRINIVIL/ZESTRIL) 20 MG tablet [Pharmacy Med Name: LISINOPRIL 20MG TABLETS] 90 tablet 0     Sig: TAKE 1 TABLET(20 MG) BY MOUTH DAILY    ACE Inhibitors (Including Combos) Protocol Failed - 2/15/2019 12:01 PM       Failed - Recent (12 mo) or future (30 days) visit within the authorizing provider's specialty    Patient had office visit in the last 12 months or has a visit in the next 30 days with authorizing provider or within the authorizing provider's specialty.  See \"Patient Info\" tab in inbasket, or \"Choose Columns\" in Meds & Orders section of the refill encounter.             Passed - Blood pressure under 140/90 in past 12 months    BP Readings from Last 3 Encounters:   09/24/18 109/70   09/11/18 104/59   07/16/18 129/85                Passed - Medication is active on med list       Passed - Patient is age 18 or older       Passed - No active pregnancy on record       Passed - Normal serum creatinine on file in past 12 months    Recent Labs   Lab Test 07/16/18  0740   CR 0.70            Passed - Normal serum potassium on file in past 12 months    Recent Labs   Lab Test 07/16/18  0740   POTASSIUM 4.2            Passed - No positive pregnancy test within past 12 months          Medication is being filled for 1 time refill only due to:  pt needs an appointment for further refills   Becky Gunn RN on 2/15/2019 at 12:05 PM      "

## 2019-03-18 DIAGNOSIS — I10 ESSENTIAL HYPERTENSION: ICD-10-CM

## 2019-03-18 RX ORDER — LISINOPRIL 20 MG/1
TABLET ORAL
Qty: 30 TABLET | Refills: 0 | Status: SHIPPED | OUTPATIENT
Start: 2019-03-18 | End: 2019-04-23

## 2019-03-18 NOTE — TELEPHONE ENCOUNTER
"Requested Prescriptions   Pending Prescriptions Disp Refills     lisinopril (PRINIVIL/ZESTRIL) 20 MG tablet [Pharmacy Med Name: LISINOPRIL 20MG TABLETS] 30 tablet 0     Sig: TAKE 1 TABLET BY MOUTH DAILY    ACE Inhibitors (Including Combos) Protocol Passed - 3/18/2019  9:54 AM       Passed - Blood pressure under 140/90 in past 12 months    BP Readings from Last 3 Encounters:   09/24/18 109/70   09/11/18 104/59   07/16/18 129/85                Passed - Recent (12 mo) or future (30 days) visit within the authorizing provider's specialty    Patient had office visit in the last 12 months or has a visit in the next 30 days with authorizing provider or within the authorizing provider's specialty.  See \"Patient Info\" tab in inbasket, or \"Choose Columns\" in Meds & Orders section of the refill encounter.             Passed - Medication is active on med list       Passed - Patient is age 18 or older       Passed - No active pregnancy on record       Passed - Normal serum creatinine on file in past 12 months    Recent Labs   Lab Test 07/16/18  0740   CR 0.70            Passed - Normal serum potassium on file in past 12 months    Recent Labs   Lab Test 07/16/18  0740   POTASSIUM 4.2            Passed - No positive pregnancy test within past 12 months        Next 5 appointments (look out 90 days)    Mar 20, 2019 11:00 AM CDT  PHYSICAL with Oliva Lovell MD  [facility] ([facility]) [address] [place] [address]  [phone]        Prescription approved per [facility] Refill Protocol.  Becky Gunn RN on 3/18/2019 at 9:58 AM      "

## 2019-04-23 DIAGNOSIS — I10 ESSENTIAL HYPERTENSION: ICD-10-CM

## 2019-04-23 RX ORDER — LISINOPRIL 20 MG/1
TABLET ORAL
Qty: 30 TABLET | Refills: 0 | Status: SHIPPED | OUTPATIENT
Start: 2019-04-23 | End: 2019-04-24

## 2019-04-23 NOTE — TELEPHONE ENCOUNTER
"Requested Prescriptions   Pending Prescriptions Disp Refills     lisinopril (PRINIVIL/ZESTRIL) 20 MG tablet [Pharmacy Med Name: LISINOPRIL 20MG TABLETS] 30 tablet 0     Sig: TAKE 1 TABLET BY MOUTH DAILY       ACE Inhibitors (Including Combos) Protocol Passed - 4/23/2019 10:09 AM        Passed - Blood pressure under 140/90 in past 12 months     BP Readings from Last 3 Encounters:   09/24/18 109/70   09/11/18 104/59   07/16/18 129/85                 Passed - Recent (12 mo) or future (30 days) visit within the authorizing provider's specialty     Patient had office visit in the last 12 months or has a visit in the next 30 days with authorizing provider or within the authorizing provider's specialty.  See \"Patient Info\" tab in inbasket, or \"Choose Columns\" in Meds & Orders section of the refill encounter.              Passed - Medication is active on med list        Passed - Patient is age 18 or older        Passed - No active pregnancy on record        Passed - Normal serum creatinine on file in past 12 months     Recent Labs   Lab Test 07/16/18  0740   CR 0.70             Passed - Normal serum potassium on file in past 12 months     Recent Labs   Lab Test 07/16/18  0740   POTASSIUM 4.2             Passed - No positive pregnancy test within past 12 months        Next 5 appointments (look out 90 days)    Apr 24, 2019  1:30 PM CDT  PHYSICAL with Oliva Lovell MD  [facility] [place] ([facility]) [address] [place] [address]  [phone]        Prescription approved per [facility]ill Protocol.  Becky Gunn RN on 4/23/2019 at 10:15 AM    "

## 2019-04-24 ENCOUNTER — OFFICE VISIT (OUTPATIENT)
Dept: OBGYN | Facility: CLINIC | Age: 64
End: 2019-04-24
Attending: OBSTETRICS & GYNECOLOGY
Payer: COMMERCIAL

## 2019-04-24 ENCOUNTER — ANCILLARY PROCEDURE (OUTPATIENT)
Dept: MAMMOGRAPHY | Facility: CLINIC | Age: 64
End: 2019-04-24
Attending: OBSTETRICS & GYNECOLOGY
Payer: COMMERCIAL

## 2019-04-24 VITALS
HEART RATE: 60 BPM | DIASTOLIC BLOOD PRESSURE: 68 MMHG | HEIGHT: 60 IN | WEIGHT: 211 LBS | SYSTOLIC BLOOD PRESSURE: 108 MMHG | BODY MASS INDEX: 41.43 KG/M2

## 2019-04-24 DIAGNOSIS — E66.01 MORBID OBESITY WITH BMI OF 40.0-44.9, ADULT (H): ICD-10-CM

## 2019-04-24 DIAGNOSIS — Z12.31 VISIT FOR SCREENING MAMMOGRAM: ICD-10-CM

## 2019-04-24 DIAGNOSIS — Z01.419 ENCOUNTER FOR GYNECOLOGICAL EXAMINATION WITHOUT ABNORMAL FINDING: Primary | ICD-10-CM

## 2019-04-24 DIAGNOSIS — I10 ESSENTIAL HYPERTENSION: ICD-10-CM

## 2019-04-24 DIAGNOSIS — M81.0 OSTEOPOROSIS, SENILE: ICD-10-CM

## 2019-04-24 PROCEDURE — 77067 SCR MAMMO BI INCL CAD: CPT | Mod: TC

## 2019-04-24 PROCEDURE — 99213 OFFICE O/P EST LOW 20 MIN: CPT | Mod: 25 | Performed by: OBSTETRICS & GYNECOLOGY

## 2019-04-24 PROCEDURE — 99396 PREV VISIT EST AGE 40-64: CPT | Performed by: OBSTETRICS & GYNECOLOGY

## 2019-04-24 RX ORDER — LISINOPRIL 20 MG/1
20 TABLET ORAL DAILY
Qty: 90 TABLET | Refills: 3 | Status: SHIPPED | OUTPATIENT
Start: 2019-04-24 | End: 2020-03-03

## 2019-04-24 RX ORDER — PHENTERMINE HYDROCHLORIDE 15 MG/1
15 CAPSULE ORAL EVERY MORNING
Qty: 30 CAPSULE | Refills: 0 | Status: SHIPPED | OUTPATIENT
Start: 2019-04-24 | End: 2019-05-29

## 2019-04-24 ASSESSMENT — MIFFLIN-ST. JEOR: SCORE: 1438.59

## 2019-04-24 ASSESSMENT — ANXIETY QUESTIONNAIRES
7. FEELING AFRAID AS IF SOMETHING AWFUL MIGHT HAPPEN: NOT AT ALL
6. BECOMING EASILY ANNOYED OR IRRITABLE: NOT AT ALL
5. BEING SO RESTLESS THAT IT IS HARD TO SIT STILL: NOT AT ALL
GAD7 TOTAL SCORE: 1
2. NOT BEING ABLE TO STOP OR CONTROL WORRYING: NOT AT ALL
3. WORRYING TOO MUCH ABOUT DIFFERENT THINGS: SEVERAL DAYS
1. FEELING NERVOUS, ANXIOUS, OR ON EDGE: NOT AT ALL

## 2019-04-24 ASSESSMENT — PATIENT HEALTH QUESTIONNAIRE - PHQ9
SUM OF ALL RESPONSES TO PHQ QUESTIONS 1-9: 1
5. POOR APPETITE OR OVEREATING: NOT AT ALL

## 2019-04-24 NOTE — PROGRESS NOTES
Ana is a 63 year old  female who presents for annual exam.     Besides routine health maintenance, she has no other health concerns today .    HPI:  The patient's PCP is Oliva Lovell MD.    Patient again just continues to struggle with weight. Was eating almost nothing and exercising 6 x/week before her daughter's wedding and did lose some weight. Felt really good even though it wasn't a huge amount lost. Now is still exercising a lot and really enjoys exercise. Is eating a salad from  [employer] for lunch. Doesn't want to make a huge dinner for just herself so usually eats something small and almost always healthy. Loves salad and veggies and fruit. Rarely eats bread or pasta anymore and just can't seem to budge the weight. Really is something that is her biggest struggle and cole in life.    Her BP is fine on her meds and needs refill.    Not Fasting for labs today but was normal last year.    Kids are all doing great. Going on a trip with just her daughters for mother's day. So excited b/c never gets just them together anymore b/c it's usually their sig others and then her son and his sig other so really excited. Have all sorts of activities planned and she's just worried that she won't be able to even keep up with them      GYNECOLOGIC HISTORY:    No LMP recorded. Patient is postmenopausal.  Her current contraception method is: tubal ligation and menopause.  She  reports that she has never smoked. She has never used smokeless tobacco.    Patient is not sexually active.  STD testing offered?  Declined  Last PHQ-9 score on record =   PHQ-9 SCORE 2019   PHQ-9 Total Score 1     Last GAD7 score on record =   SAMIR-7 SCORE 2019   Total Score 1     Alcohol Score = 1    HEALTH MAINTENANCE:  Cholesterol: 18   Total= 173, Triglycerides=137, HDL=64, LDL=81, FBS=86 -patient is not fasting  Last Mammo: 18, Result: normal, Next Mammo: today   Pap: 18 neg, HPV-  Colonoscopy: 17,  Result: normal, Next Colonoscopy: 3 years.  Dexa: 18    Health maintenance updated:  yes    HISTORY:  OB History    Para Term  AB Living   4 3 3 0 1 3   SAB TAB Ectopic Multiple Live Births   1 0 0 0 3      # Outcome Date GA Lbr Jesus/2nd Weight Sex Delivery Anes PTL Lv   4 Term 94 40w0d  3.685 kg (8 lb 2 oz) F Vag-Vacuum   NATASHA   3 Term 89 42w0d  3.657 kg (8 lb 1 oz) F Vag-Vacuum   NATASHA   2 Term 89    M    NATASHA   1 SAB                Patient Active Problem List   Diagnosis     Essential hypertension     Osteoporosis, senile     Morbid obesity (H)     Past Surgical History:   Procedure Laterality Date     BIOPSY BREAST  2001, 2014    excisional left side-B9 fibrocystic change. stereotactic bx left at 2 oclock for calcs. path-atypical ductal proliferation     COLONOSCOPY N/A 2017    Procedure: COMBINED COLONOSCOPY, SINGLE OR MULTIPLE BIOPSY/POLYPECTOMY BY BIOPSY;  Surgeon: Jerald Grant MD;  Location:  GI     LITHOTRIPSY  2007     MAMMOPLASTY REDUCTION  2014    Dr. Trujillo and Reji. had atypical cells noted on a bx but also wanted reduction so done together.     PARATHYROIDECTOMY Right 2015    Procedure: PARATHYROIDECTOMY;  Surgeon: Lynnette Sewell MD;  Location: [facility] OR     TUBAL LIGATION  1994    post partum      Social History     Tobacco Use     Smoking status: Never Smoker     Smokeless tobacco: Never Used   Substance Use Topics     Alcohol use: Yes     Alcohol/week: 0.0 oz      Problem (# of Occurrences) Relation (Name,Age of Onset)    Breast Cancer (1) Maternal Grandmother    Cerebrovascular Disease (2) Mother, Paternal Grandmother    Colon Cancer (1) Father (90)    Coronary Artery Disease (1) Mother    Diabetes (1) Mother    Hyperlipidemia (1) Mother    Hypertension (1) Mother    Other Cancer (1) Maternal Grandmother            Current Outpatient Medications   Medication Sig     calcium carb 1250 mg, 500 mg [place],/vitamin D 200 units  (OSCAL WITH D) 500-200 MG-UNIT per tablet Take 2 tablets by mouth every 12 hours     lisinopril (PRINIVIL/ZESTRIL) 20 MG tablet Take 1 tablet (20 mg) by mouth daily     Omega-3 Fatty Acids (FISH OIL PO)      phentermine (ADIPEX-P) 15 MG capsule Take 1 capsule (15 mg) by mouth every morning     No current facility-administered medications for this visit.      No Known Allergies    Past medical, surgical, social and family histories were reviewed and updated in EPIC.    ROS:   12 point review of systems negative other than symptoms noted below.    EXAM:  /68   Pulse 60   Ht 1.524 m (5')   Wt 95.7 kg (211 lb)   BMI 41.21 kg/m     BMI: Body mass index is 41.21 kg/m .    PHYSICAL EXAM:  Constitutional:  Appearance: Well nourished, well developed, alert, in no acute distress  Neck:  Lymph Nodes:  No lymphadenopathy present    Thyroid:  Gland size normal, nontender, no nodules or masses present  on palpation  Chest:  Respiratory Effort:  Breathing unlabored  Cardiovascular:    Heart: Auscultation:  Regular rate, normal rhythm, no murmurs present  Breasts: Palpation of Breasts and Axillae:  No masses present on palpation, no breast tenderness., No nodularity, asymmetry or nipple discharge bilaterally. and REDUCTION SCARS  Gastrointestinal:   Abdominal Examination:  Abdomen nontender to palpation, tone normal without rigidity or guarding, no masses present, umbilicus without lesions   Liver and Spleen:  No hepatomegaly present, liver nontender to palpation    Hernias:  No hernias present  Lymphatic: Lymph Nodes:  No other lymphadenopathy present  Skin:  General Inspection:  No rashes present, no lesions present, no areas of  discoloration    Genitalia and Groin:  No rashes present, no lesions present, no areas of  discoloration, no masses present  Neurologic/Psychiatric:    Mental Status:  Oriented X3     Pelvic Exam:  External Genitalia:     Normal appearance for age, no discharge present, no tenderness present,  no inflammatory lesions present, color normal  Vagina:     Normal vaginal vault without central or paravaginal defects, no discharge present, no inflammatory lesions present, no masses present  Bladder:     Nontender to palpation  Urethra:   Urethral Body:  Urethra palpation normal, urethra structural support normal   Urethral Meatus:  No erythema or lesions present  Cervix:     Appearance healthy, no lesions present, nontender to palpation, no bleeding present  Uterus:     Uterus: firm, normal sized and nontender, anteverted in position.   Adnexa:     No adnexal tenderness present, no adnexal masses present  Perineum:     Perineum within normal limits, no evidence of trauma, no rashes or skin lesions present  Anus:     Anus within normal limits, no hemorrhoids present  Inguinal Lymph Nodes:     No lymphadenopathy present  Pubic Hair:     Normal pubic hair distribution for age  Genitalia and Groin:     No rashes present, no lesions present, no areas of discoloration, no masses present      COUNSELING:   Reviewed preventive health counseling, as reflected in patient instructions  Special attention given to:        Regular exercise       Healthy diet/nutrition    BMI: Body mass index is 41.21 kg/m .  Weight management plan: see below    ASSESSMENT:  63 year old female with satisfactory annual exam.    ICD-10-CM    1. Encounter for gynecological examination without abnormal finding Z01.419    2. Essential hypertension I10 lisinopril (PRINIVIL/ZESTRIL) 20 MG tablet   3. Morbid obesity with BMI of 40.0-44.9, adult (H) E66.01 phentermine (ADIPEX-P) 15 MG capsule    Z68.41    4. Osteoporosis, senile M81.0        PLAN:  Pap is UTD for 3-4 more years  mammo today  Patient had a left hip and spine dexa done that showed just mild to moderate penia of the hips and normal spine. However also had an ulnar dexa, and unclear why, and that showed osteoporosis. Since that isn't as clinically relevant would just repeat dexa of hips  and spine only, in 2-3 more years  BP is well controlled so refill sent on her HTN meds    Discussed her weight and have for years. Almost did bariatric surgery but then was too afraid. Have never discussed medications but did today. Patient is exercising and is trying to eat healthy but at her age and with central weight and insulin resistance she is unable to lose. Discussed phentermine with all the pros/cons and r/b/a/side effects. Especially give her HTn we have to approach it slowly and with caution. Patient is veyr interested in trying the medication. rx given for low dose at 15mg and discussed diet and exercise plan. Will f/u with me in 1 month to see how it's going and readdress any other questions/concerns.  Spent an additional 15 min on top of her annual to address the above.    Oliva Lovell MD

## 2019-04-25 ASSESSMENT — ANXIETY QUESTIONNAIRES: GAD7 TOTAL SCORE: 1

## 2019-05-22 NOTE — PROGRESS NOTES
SUBJECTIVE:                                                   Ana Dupree is a 63 year old female who presents to clinic today for the following health issue(s):  Patient presents with:  Recheck Medication: Phentermine      HPI:  Patient is here for a one month f/u on her phentermine start. Patient has struggled with weight loss for most of her adult life and especially since menopause. Lost weight for her daughter's wedding and was 190# and even at that felt really good.    Has continued to exercise at least 4-5x/week and walk her dog in addition. Really tries to eat healthy and not that much. Eats a salad from  [employer] every day for lunch. Does have some dressing with it but o/w will have hard boiled eggs or avocado toast for breakfast and a very small dinner and yet couldn't lose.    Patient was started on 15mg phent. No side effects at all. Feeling really good and is donw 8#. Had hoped it would be even more than that but did travel 2 weekends during that time. Has more energy, less cravings, feels like able to limit her portions better and really happy that finally moving the right direction.    In addition to this is now sick the last few days. Bad cough and sore throat. No nasal congestion at this point. No fevers or chills. Has flown 2x in the last 2 weeks and not as much rest between that and work so likely just picked up a virus but wants to r/o strep.    No LMP recorded. Patient is postmenopausal..   Patient is not sexually active, .  Using menopause for contraception.    reports that she has never smoked. She has never used smokeless tobacco.    STD testing offered?  Declined    Health maintenance updated:  yes    Today's PHQ-2 Score:   PHQ-2 (  [employer]) 3/22/2016   Q1: Little interest or pleasure in doing things 0   Q2: Feeling down, depressed or hopeless 0   PHQ-2 Score 0     Today's PHQ-9 Score:   PHQ-9 SCORE 2019   PHQ-9 Total Score 1     Today's SAMIR-7 Score:   SAMIR-7  SCORE 4/24/2019   Total Score 1       Problem list and histories reviewed & adjusted, as indicated.  Additional history: as documented.    Patient Active Problem List   Diagnosis     Essential hypertension     Osteoporosis, senile     Morbid obesity (H)     Past Surgical History:   Procedure Laterality Date     BIOPSY BREAST  11/2001, 07/07/2014    excisional left side-B9 fibrocystic change. stereotactic bx left at 2 oclock for calcs. path-atypical ductal proliferation     COLONOSCOPY N/A 1/19/2017    Procedure: COMBINED COLONOSCOPY, SINGLE OR MULTIPLE BIOPSY/POLYPECTOMY BY BIOPSY;  Surgeon: Jerald Grant MD;  Location:  GI     LITHOTRIPSY  2007     MAMMOPLASTY REDUCTION  11/07/2014    Dr. Trujillo and Reji. had atypical cells noted on a bx but also wanted reduction so done together.     PARATHYROIDECTOMY Right 8/21/2015    Procedure: PARATHYROIDECTOMY;  Surgeon: Lynnette Sewell MD;  Location: [facility]     TUBAL LIGATION  12/14/1994    post partum      Social History     Tobacco Use     Smoking status: Never Smoker     Smokeless tobacco: Never Used   Substance Use Topics     Alcohol use: Yes     Alcohol/week: 0.0 oz      Problem (# of Occurrences) Relation (Name,Age of Onset)    Breast Cancer (1) Maternal Grandmother    Cerebrovascular Disease (2) Mother, Paternal Grandmother    Colon Cancer (1) Father (90)    Coronary Artery Disease (1) Mother    Diabetes (1) Mother    Hyperlipidemia (1) Mother    Hypertension (1) Mother    Other Cancer (1) Maternal Grandmother            Current Outpatient Medications   Medication Sig     calcium carb 1250 mg, 500 mg [place],/vitamin D 200 units (OSCAL WITH D) 500-200 MG-UNIT per tablet Take 2 tablets by mouth every 12 hours     guaiFENesin-codeine (ROBITUSSIN AC) 100-10 MG/5ML solution Take 5-10 mLs by mouth every 4 hours as needed for cough     lisinopril (PRINIVIL/ZESTRIL) 20 MG tablet Take 1 tablet (20 mg) by mouth daily     phentermine (ADIPEX-P) 15 MG capsule Take 1  capsule (15 mg) by mouth every morning     No current facility-administered medications for this visit.      No Known Allergies    ROS:  12 point review of systems negative other than symptoms noted below.  Head: Sore Throat  Respiratory: Cough    OBJECTIVE:     /62   Temp 99.4  F (37.4  C) (Oral)   Wt (P) 92.7 kg (204 lb 6.4 oz)   BMI (P) 39.92 kg/m    Body mass index is 39.92 kg/m  (pended).    Exam:  Constitutional:  Appearance: Well nourished, well developed alert, in no acute distress  Neck:  Lymph Nodes:  No lymphadenopathy present; Thyroid:  Gland size normal, nontender, no nodules or masses present on palpation  Throat:very minimal erythema, no exudate  Chest:  Respiratory Effort:  Breathing unlabored  Cardiovascular: Heart: Auscultation:  Regular rate, normal rhythm, no murmurs present     In-Clinic Test Results:  No results found for this or any previous visit (from the past 24 hour(s)).    ASSESSMENT/PLAN:                                                        ICD-10-CM    1. Morbid obesity with BMI of 40.0-44.9, adult (H) E66.01 phentermine (ADIPEX-P) 15 MG capsule    Z68.41    2. Sore throat J02.9 Strep, Rapid Screen     Beta strep group A culture   3. Cough R05 guaiFENesin-codeine (ROBITUSSIN AC) 100-10 MG/5ML solution         Patient is having good progress in the last month with losing 8#. Discussed pros/cons of bumping to 30mg phentermine but feel that 8# is actually quite good and will stay on 15mg to decrease any heart/BP impacts. Her BP is great today though  Will f/u in one more month and see how she's doing  Rapid strep done and neg. Will send culture  Given rx for Robitussin AC so that she can at least get some rest. Encouraged comfort cares. Discussed safe otc meds given she's on phentermine. If not improving after 7-10 days should contact me    Oliva Lovell MD  [facility]

## 2019-05-29 ENCOUNTER — OFFICE VISIT (OUTPATIENT)
Dept: OBGYN | Facility: CLINIC | Age: 64
End: 2019-05-29
Payer: COMMERCIAL

## 2019-05-29 VITALS — SYSTOLIC BLOOD PRESSURE: 106 MMHG | DIASTOLIC BLOOD PRESSURE: 62 MMHG | TEMPERATURE: 99.4 F

## 2019-05-29 DIAGNOSIS — E66.01 MORBID OBESITY WITH BMI OF 40.0-44.9, ADULT (H): Primary | ICD-10-CM

## 2019-05-29 DIAGNOSIS — J02.9 SORE THROAT: ICD-10-CM

## 2019-05-29 DIAGNOSIS — R05.9 COUGH: ICD-10-CM

## 2019-05-29 LAB
DEPRECATED S PYO AG THROAT QL EIA: NORMAL
SPECIMEN SOURCE: NORMAL

## 2019-05-29 PROCEDURE — 99214 OFFICE O/P EST MOD 30 MIN: CPT | Performed by: OBSTETRICS & GYNECOLOGY

## 2019-05-29 PROCEDURE — 87880 STREP A ASSAY W/OPTIC: CPT | Performed by: OBSTETRICS & GYNECOLOGY

## 2019-05-29 PROCEDURE — 87081 CULTURE SCREEN ONLY: CPT | Performed by: OBSTETRICS & GYNECOLOGY

## 2019-05-29 RX ORDER — CODEINE PHOSPHATE AND GUAIFENESIN 10; 100 MG/5ML; MG/5ML
1-2 SOLUTION ORAL EVERY 4 HOURS PRN
Qty: 118 ML | Refills: 0 | Status: SHIPPED | OUTPATIENT
Start: 2019-05-29 | End: 2019-07-24

## 2019-05-29 RX ORDER — PHENTERMINE HYDROCHLORIDE 15 MG/1
15 CAPSULE ORAL EVERY MORNING
Qty: 30 CAPSULE | Refills: 0 | Status: SHIPPED | OUTPATIENT
Start: 2019-05-29 | End: 2019-07-24

## 2019-06-02 LAB
BACTERIA SPEC CULT: NORMAL
SPECIMEN SOURCE: NORMAL

## 2019-07-17 NOTE — PROGRESS NOTES
SUBJECTIVE:                                                   Ana Dupree is a 63 year old female who presents to clinic today for the following health issue(s):  Patient presents with:  Medication Follow-up: phentermine-has ran out of her medication          HPI:  Patient has now done 2 months of phentermine at 15mg. Had to reschedule her last appointment b/c didn't have much time with work so has actually been out of med for 2 weeks. Didn't realize she could call and get an extension on it so didn't  States she was tolerating it fine other than definitely more trouble falling to sleep on it. Tends to be someone who once she gets thinking and worrying she can't really stop so sometimes has a hard time getting to sleep anyway. Other than that never had any side effects.  However definitely felt by month 2 that the phentermine wasn't working nearly as well for cravings and appetite suppression like it had in the first month  Has continued to eat really healthy, small portions, making good food choices. If does any snacking will do low fat popcorn. Exercising several times a week and doing a morning boot camp. Rarely if ever has alcohol. Never has fast food and almost never has soda  Started at 211 in mid April. One month later was 204#. Today was 200# but again, off meds for 2 weeks already. Disappointed that didn't lose. However at the gym today she was 199.6# which felt really good to just be under 200#  Has her 64rd bday coming up so going to brainerd with kids and then her 45 year [employer] reunion. Really wishes she could be down lower by then and worried that the bday weekend will derail her weight loss    No LMP recorded. Patient is postmenopausal..   Patient is not sexually active, .  Using not sexually active for contraception.    reports that she has never smoked. She has never used smokeless tobacco.    STD testing offered?  Declined    Health maintenance updated:  yes    Today's PHQ-2 Score:    PHQ-2 ( 1999 [employer]) 7/24/2019   Q1: Little interest or pleasure in doing things 0   Q2: Feeling down, depressed or hopeless 0   PHQ-2 Score 0     Today's PHQ-9 Score:   PHQ-9 SCORE 4/24/2019   PHQ-9 Total Score 1     Today's SAMIR-7 Score:   SAMIR-7 SCORE 4/24/2019   Total Score 1       Problem list and histories reviewed & adjusted, as indicated.  Additional history: as documented.    Patient Active Problem List   Diagnosis     Essential hypertension     Osteoporosis, senile     Morbid obesity (H)     Past Surgical History:   Procedure Laterality Date     BIOPSY BREAST  11/2001, 07/07/2014    excisional left side-B9 fibrocystic change. stereotactic bx left at 2 oclock for calcs. path-atypical ductal proliferation     COLONOSCOPY N/A 1/19/2017    Procedure: COMBINED COLONOSCOPY, SINGLE OR MULTIPLE BIOPSY/POLYPECTOMY BY BIOPSY;  Surgeon: Jerald Grant MD;  Location:  GI     LITHOTRIPSY  2007     MAMMOPLASTY REDUCTION  11/07/2014    Dr. Trujillo and Reji. had atypical cells noted on a bx but also wanted reduction so done together.     PARATHYROIDECTOMY Right 8/21/2015    Procedure: PARATHYROIDECTOMY;  Surgeon: Lynnette Sewell MD;  Location: [facility] OR     TUBAL LIGATION  12/14/1994    post partum      Social History     Tobacco Use     Smoking status: Never Smoker     Smokeless tobacco: Never Used   Substance Use Topics     Alcohol use: Yes     Alcohol/week: 0.0 oz      Problem (# of Occurrences) Relation (Name,Age of Onset)    Breast Cancer (1) Maternal Grandmother    Cerebrovascular Disease (2) Mother, Paternal Grandmother    Colon Cancer (1) Father (90)    Coronary Artery Disease (1) Mother    Diabetes (1) Mother    Hyperlipidemia (1) Mother    Hypertension (1) Mother    Other Cancer (1) Maternal Grandmother            Current Outpatient Medications   Medication Sig     calcium carb 1250 mg, 500 mg [place],/vitamin D 200 units (OSCAL WITH D) 500-200 MG-UNIT per tablet Take 2 tablets by mouth every 12 hours      lisinopril (PRINIVIL/ZESTRIL) 20 MG tablet Take 1 tablet (20 mg) by mouth daily     phentermine (ADIPEX-P) 30 MG capsule Take 1 capsule (30 mg) by mouth every morning     No current facility-administered medications for this visit.      No Known Allergies    ROS:  12 point review of systems negative other than symptoms noted below.    OBJECTIVE:     /68   Pulse 76   Ht 1.524 m (5')   Wt 90.7 kg (200 lb)   BMI 39.06 kg/m    Body mass index is 39.06 kg/m .    Exam:  Constitutional:  Appearance: Well nourished, well developed alert, in no acute distress  Chest:  Respiratory Effort:  Breathing unlabored, CTA bilaterally  Cardiovascular: Heart: Auscultation:  Regular rate, normal rhythm, no murmurs present  Gastrointestinal:  Abdominal Examination:  Abdomen nontender to palpation, tone normal without rigidity or guarding, no masses present, umbilicus without lesions; Liver/Spleen:  No hepatomegaly present, liver nontender to palpation; Hernias:  No hernias present     In-Clinic Test Results:  No results found for this or any previous visit (from the past 24 hour(s)).    ASSESSMENT/PLAN:                                                        ICD-10-CM    1. BMI 39.0-39.9,adult Z68.39 phentermine (ADIPEX-P) 30 MG capsule         Patient's weight loss is slower this last 6 weeks but also has been off her medication for 2 of those weeks  Started at lower dose given her age and to make sure she didn't have HTN from it. BP is great today but off meds. Was fine last month though when was on meds  Feels she's hit a plateau on the meds before running out.  Other than some expected insomnia had no s.e at all.  Will bump her to 30mg daily, take as early in the AM as possible, and then f/u in 4 weeks    Oliva Lovell MD  [facility]

## 2019-07-24 ENCOUNTER — OFFICE VISIT (OUTPATIENT)
Dept: OBGYN | Facility: CLINIC | Age: 64
End: 2019-07-24
Payer: COMMERCIAL

## 2019-07-24 VITALS
WEIGHT: 200 LBS | HEART RATE: 76 BPM | HEIGHT: 60 IN | BODY MASS INDEX: 39.27 KG/M2 | SYSTOLIC BLOOD PRESSURE: 100 MMHG | DIASTOLIC BLOOD PRESSURE: 68 MMHG

## 2019-07-24 PROCEDURE — 99213 OFFICE O/P EST LOW 20 MIN: CPT | Performed by: OBSTETRICS & GYNECOLOGY

## 2019-07-24 RX ORDER — PHENTERMINE HYDROCHLORIDE 30 MG/1
30 CAPSULE ORAL EVERY MORNING
Qty: 35 CAPSULE | Refills: 0 | Status: SHIPPED | OUTPATIENT
Start: 2019-07-24 | End: 2019-09-18

## 2019-07-24 ASSESSMENT — MIFFLIN-ST. JEOR: SCORE: 1388.69

## 2019-09-13 NOTE — PROGRESS NOTES
"    SUBJECTIVE:                                                   Ana Dupree is a 64 year old female who presents to clinic today for the following health issue(s):  Patient presents with:  Recheck Medication: Patient states she ran out of the Phentermine but it has not worked when one it.       HPI:  Patient was started on phentermine initially at 15mg a few months back. Stated it helped a little and she was able to lose about 10-12# on that but didn't feel it was working quite well enough.  Bumped her up to 30mg and she actually ran out 2 weeks ago. This occurred with the 15mg as well. Patient runs out but doesn't call for refills and then is off by the time of her visit.  BP has been fine at each appointment but again, hasn't been on med for a while by the time of the visit.  States she took it for a month. \"maybe it helped\" felt she definitely ate smaller portions, didn't have as many cravings, but didn't really lose all that much weight.  Patient is continuing to exercise at least 4-5x/week. Has a bootcamp she signed up for and it's expensive and not sure it's doing more for her then just going to the gym and doing elliptical which is part of her apt complex.   States as it is she has no sweets at home and hardly snacks. Goes to bed by 8pm so as not to snack late at night. 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 Does have dressing with it but has tried to just use balsamic to see if that helps eliminated some carbs/cals  States when she works nights at the [employer] Friday/saturday she will definitely snack more b/c they have snacks there. Would get some fruit snacks or some popcorn or something.  Also b/c she works so late there will occasionally then go to a [employer] bell at 11 pm on her way home. Or sometimes will get DQ for her daughter's dogs and then gets herself a PB parfait. Knows she shouldn't do those things but they're the exception and not the rule.  Can't " understand why she's not losing and is really frustrated    No LMP recorded. Patient is postmenopausal..   Patient is not sexually active, .  Using menopause for contraception.    reports that she has never smoked. She has never used smokeless tobacco.    STD testing offered?  Declined    Health maintenance updated:  yes    Today's PHQ-2 Score:   PHQ-2 (  [employer]) 2019   Q1: Little interest or pleasure in doing things 0   Q2: Feeling down, depressed or hopeless 0   PHQ-2 Score 0     Today's PHQ-9 Score:   PHQ-9 SCORE 2019   PHQ-9 Total Score 1     Today's SAMIR-7 Score:   SAMIR-7 SCORE 2019   Total Score 1       Problem list and histories reviewed & adjusted, as indicated.  Additional history: as documented.    Patient Active Problem List   Diagnosis     Essential hypertension     Osteoporosis, senile     Morbid obesity (H)     Past Surgical History:   Procedure Laterality Date     BIOPSY BREAST  2001, 2014    excisional left side-B9 fibrocystic change. stereotactic bx left at 2 oclock for calcs. path-atypical ductal proliferation     COLONOSCOPY N/A 2017    Procedure: COMBINED COLONOSCOPY, SINGLE OR MULTIPLE BIOPSY/POLYPECTOMY BY BIOPSY;  Surgeon: Jerald Grant MD;  Location:  GI     LITHOTRIPSY       MAMMOPLASTY REDUCTION  2014    Dr. Trujillo and Reji. had atypical cells noted on a bx but also wanted reduction so done together.     PARATHYROIDECTOMY Right 2015    Procedure: PARATHYROIDECTOMY;  Surgeon: Lynnette Sewell MD;  Location: [facility] OR     TUBAL LIGATION  1994    post partum      Social History     Tobacco Use     Smoking status: Never Smoker     Smokeless tobacco: Never Used   Substance Use Topics     Alcohol use: Yes     Alcohol/week: 0.0 oz      Problem (# of Occurrences) Relation (Name,Age of Onset)    Breast Cancer (1) Maternal Grandmother    Cerebrovascular Disease (2) Mother, Paternal Grandmother    Colon Cancer (1) Father (90)     Coronary Artery Disease (1) Mother    Diabetes (1) Mother    Hyperlipidemia (1) Mother    Hypertension (1) Mother    Other Cancer (1) Maternal Grandmother            Current Outpatient Medications   Medication Sig     calcium carb 1250 mg, 500 mg [place],/vitamin D 200 units (OSCAL WITH D) 500-200 MG-UNIT per tablet Take 2 tablets by mouth every 12 hours     lisinopril (PRINIVIL/ZESTRIL) 20 MG tablet Take 1 tablet (20 mg) by mouth daily     phentermine (ADIPEX-P) 30 MG capsule Take 1 capsule (30 mg) by mouth every morning     No current facility-administered medications for this visit.      No Known Allergies    ROS:  12 point review of systems negative other than symptoms noted below.    OBJECTIVE:     BP 92/60 (BP Location: Right arm, Patient Position: Sitting, Cuff Size: Adult Large)   Pulse 72   Ht 1.524 m (5')   Wt 90.6 kg (199 lb 12.8 oz)   Breastfeeding? No   BMI 39.02 kg/m    Body mass index is 39.02 kg/m .    Exam:  Constitutional:  Appearance: Well nourished, well developed alert, in no acute distress  Chest:  Respiratory Effort:  Breathing unlabored, CTA bilaterally  Cardiovascular: Heart: Auscultation:  Regular rate, normal rhythm, no murmurs present  Neurologic/Psychiatric:  Mental Status:  Oriented X3      In-Clinic Test Results:  No results found for this or any previous visit (from the past 24 hour(s)).    ASSESSMENT/PLAN:                                                        ICD-10-CM    1. BMI 39.0-39.9,adult Z68.39 phentermine (ADIPEX-P) 30 MG capsule         Patient's BP is normal, but again has been off phentermine for 2 weeks now.  Strongly encouraged her to stick with it b/c she is generally making good choices with diet and it was helping with portion control as well as generally just speeding up her basal metabolic rate  Offered reassurance that as we get older the basal metabolic rate is definitely slower and it's harder to lose  Discussed some of the snacking or fast food  choices  Discussed that with exercise, muscle confusion and doing different things is key. Elliptical is great but doing intervals or occasional weights or swimming or anything that changes it up can help. Likely this applies to diet as well. Probably needs to change up what she's eating and just eat different types of foods to encourage metabolic rate changes  Discussed healthy for life meals since she's single and doesn't need to cook for anyone but herself as a way to have variety and healthy meals and she may consider it. Had done it in the past and she lost but then gained it back so feels a bit hopeless about it.  Starting to beat herself up about why she didn't do the bariatric surgery a few years back when she'd considered it    Will restart on the phentermine at 30mg. Will see me in about 5 weeks or so due to scheduling and see how it's going as well as being able to check her BP when she's actually on it since we haven't done that yet. Has no side effects from it when she's on it.  Tried to encourage her and remind her about health benefits of all of her choices despite the actual number on scale not changing as fast as she'd like    Spent 20 min with the patient >50% of which was in face to face counseling time    Oliav Lovell MD  [facility] FOR [facility]

## 2019-09-18 ENCOUNTER — OFFICE VISIT (OUTPATIENT)
Dept: OBGYN | Facility: CLINIC | Age: 64
End: 2019-09-18
Payer: COMMERCIAL

## 2019-09-18 VITALS
HEART RATE: 72 BPM | WEIGHT: 199.8 LBS | DIASTOLIC BLOOD PRESSURE: 60 MMHG | HEIGHT: 60 IN | BODY MASS INDEX: 39.23 KG/M2 | SYSTOLIC BLOOD PRESSURE: 92 MMHG

## 2019-09-18 PROCEDURE — 99213 OFFICE O/P EST LOW 20 MIN: CPT | Performed by: OBSTETRICS & GYNECOLOGY

## 2019-09-18 RX ORDER — PHENTERMINE HYDROCHLORIDE 30 MG/1
30 CAPSULE ORAL EVERY MORNING
Qty: 35 CAPSULE | Refills: 1 | Status: SHIPPED | OUTPATIENT
Start: 2019-09-18 | End: 2019-12-06

## 2019-09-18 ASSESSMENT — MIFFLIN-ST. JEOR: SCORE: 1382.79

## 2019-12-06 RX ORDER — PHENTERMINE HYDROCHLORIDE 30 MG/1
CAPSULE ORAL
Qty: 30 CAPSULE | Refills: 0 | Status: SHIPPED | OUTPATIENT
Start: 2019-12-06 | End: 2020-11-11

## 2019-12-06 NOTE — TELEPHONE ENCOUNTER
OV 9/18/19  Will restart on the phentermine at 30mg. Will see me in about 5 weeks or so due to scheduling and see how it's going as well as being able to check her BP when she's actually on it since we haven't done that yet. Has no side effects from it when she's on it.  Tried to encourage her and remind her about health benefits of all of her choices despite the actual number on scale not changing as fast as she'd like     Next 5 appointments (look out 90 days)    Dec 30, 2019  3:20 PM CST  Office Visit with Oliva Lovell MD  [facility] for [facility] ([facility]) [address] [id number] [phone]        Becky Gunn RN on 12/6/2019 at 12:54 PM

## 2019-12-09 NOTE — TELEPHONE ENCOUNTER
Pt calling stating that pharmacy did not have Rx ready.  Rx signed by Nas 12/6 and faxed 12/9  PT will check w pharmacy today.    Pt verbalized understanding, in agreement with plan, and voiced no further questions.  Danyelle Dickson RN on 12/9/2019 at 9:55 AM

## 2019-12-31 NOTE — PROGRESS NOTES
"    SUBJECTIVE:                                                   Ana Dupree is a 64 year old female who presents to clinic today for the following health issue(s):  Patient presents with:  Recheck Medication: f/u to phentermine- states hasn't been taking it for about 3 wks due to side effects due to chest pain/heartburn, cloudy vision, and leg cramps when taking it; also didn't think it was helping with weight loss      HPI:  Patient has been on and off phentermine for several months  She is not taking it consistently, will run out of rx b/c hasn't made appointment to f/u and then every time I've seen her recently she hasn't been on it for days to weeks so really no BP or CV exam while on meds in recent months  Patient states she knows she should be more consistent but finds that it's not really helping her that much so she doesn't take it that much  Then she feels guilty b/c using it and not really changing her own habits and feels she's using it as a crutch and shouldn't  She then also had 2 people she passively knew, that were in their 50's, who both  of an MI recently and knows that stimulants can impact her heart so then starts to worry herself about that and starts to do a bunch of research online, etc.  Complains of a shooting pain in her left chest. Has had it for years. It happens at rest, is fast and sharp, can pulsate for a few minutes or just one time shooting. Feels very lateral and over the top of her breast, both, but does feel like it's in her breast and not chest  She has no exertional dyspnea more than \"just being out of shape and overweight\". She has no chest pain other than that  She was getting some leg cramps and cloudy vision around the same time as her chest pains/breast pains but now admittedly states she thinks she just really made herself anxious after the 2 MIs  Her daughter is very healthy and health conscious. Is a  and really eats well. She came over and " "helped her pick tupperware out to have preportioned food and she found her an online company that you fill out a survey and they pick a vitamin regimen for you and then mail it to you. Has been doing that for a month and brings it for review. Wondering if this is good or she should be doing something else.  Patient continues to try to eat salad for lunch, has a small meal for dinner, goes to bed early to help avoid snacking.  Was exercising quite a bit before in addition to walking her dog when she lost weight in past. Now is not doing that but does still go to her gym a couple of times a week  She will have a once a week splurge and get a pizza or something  Does state that if she doesn't go to bed early, or if socializing and out she will snack and eat mindlessly for hours w/o even thinking about what she's doing  \"food makes me happy\"  \"sometimes if I know there's something coming up that I want to eat more I won't take my pill that day and I probably shouldn't do that, right?\"  Very much wants to lose weight and isn't opposed to having help as long as it's not more risk to her    No LMP recorded. Patient is postmenopausal..     Patient is not sexually active, .  Using not sexually active for contraception.    reports that she has never smoked. She has never used smokeless tobacco.    STD testing offered?  Declined    Health maintenance updated:  yes    Today's PHQ-9 Score:   PHQ-9 SCORE 2019   PHQ-9 Total Score 1     Today's SAMIR-7 Score:   SAMIR-7 SCORE 2019   Total Score 1       Problem list and histories reviewed & adjusted, as indicated.  Additional history: as documented.    Patient Active Problem List   Diagnosis     Essential hypertension     Osteoporosis, senile     Morbid obesity (H)     Past Surgical History:   Procedure Laterality Date     BIOPSY BREAST  2001, 2014    excisional left side-B9 fibrocystic change. stereotactic bx left at 2 oclock for calcs. path-atypical ductal " proliferation     COLONOSCOPY N/A 1/19/2017    Procedure: COMBINED COLONOSCOPY, SINGLE OR MULTIPLE BIOPSY/POLYPECTOMY BY BIOPSY;  Surgeon: Jerald Grant MD;  Location:  GI     LITHOTRIPSY  2007     MAMMOPLASTY REDUCTION  11/07/2014    Dr. Trujillo and Reji. had atypical cells noted on a bx but also wanted reduction so done together.     PARATHYROIDECTOMY Right 8/21/2015    Procedure: PARATHYROIDECTOMY;  Surgeon: Lynnette Sewell MD;  Location: [facility] OR     TUBAL LIGATION  12/14/1994    post partum      Social History     Tobacco Use     Smoking status: Never Smoker     Smokeless tobacco: Never Used   Substance Use Topics     Alcohol use: Yes     Alcohol/week: 0.0 standard drinks      Problem (# of Occurrences) Relation (Name,Age of Onset)    Breast Cancer (1) Maternal Grandmother    Cerebrovascular Disease (2) Mother, Paternal Grandmother    Colon Cancer (1) Father (90)    Coronary Artery Disease (1) Mother    Diabetes (1) Mother    Hyperlipidemia (1) Mother    Hypertension (1) Mother    No Known Problems (4) Sister, Brother, Maternal Grandfather, Other    Other Cancer (1) Maternal Grandmother            Current Outpatient Medications   Medication Sig     calcium carb 1250 mg, 500 mg [place],/vitamin D 200 units (OSCAL WITH D) 500-200 MG-UNIT per tablet Take 2 tablets by mouth every 12 hours     IRON PO      lisdexamfetamine (VYVANSE) 40 MG capsule Take 1 capsule (40 mg) by mouth every morning     lisinopril (PRINIVIL/ZESTRIL) 20 MG tablet Take 1 tablet (20 mg) by mouth daily     phentermine (ADIPEX-P) 30 MG capsule TAKE 1 CAPSULE BY MOUTH EVERY MORNING (Patient not taking: Reported on 1/6/2020)     No current facility-administered medications for this visit.      No Known Allergies    ROS:  12 point review of systems negative other than symptoms noted below or in the HPI.  Eyes: Spots  Cardiovascular: Chest Pain  Gastrointestinal: Heartburn  Musculoskeletal: Muscle Cramps        OBJECTIVE:     /60    Pulse 74   Ht 1.524 m (5')   Wt 92 kg (202 lb 12.8 oz)   BMI 39.61 kg/m    Body mass index is 39.61 kg/m .    Exam:  Constitutional:  Appearance: Well nourished, well developed alert, in no acute distress  Neck:  Lymph Nodes:  No lymphadenopathy present; Thyroid:  Gland size normal, nontender, no nodules or masses present on palpation  Chest:  Respiratory Effort:  Breathing unlabored. Clear to auscultation bilaterally.   Cardiovascular: Heart: Auscultation:  Regular rate, normal rhythm, no murmurs present  Gastrointestinal:  Abdominal Examination:  Abdomen nontender to palpation, tone normal without rigidity or guarding, no masses present, umbilicus without lesions; Liver/Spleen:  No hepatomegaly present, liver nontender to palpation; Hernias:  No hernias present     In-Clinic Test Results:  No results found for this or any previous visit (from the past 24 hour(s)).    ASSESSMENT/PLAN:                                                        ICD-10-CM    1. BMI 39.0-39.9,adult Z68.39 lisdexamfetamine (VYVANSE) 40 MG capsule   2. Binge eating disorder F50.81 lisdexamfetamine (VYVANSE) 40 MG capsule   3. Morbid obesity (H) E66.01          Patient has plateau'd on her phentermine and in addition to that is not taking it consistently and regularly partly due to worry about risks with it and partly b/c will want to eat more or snack more  Really wearing off later in the evening and then will snack and binge junk food without even being aware of it which is definitely in the class of binge eating and impulse control lack  Discussed with her vyvanse and how it works and how it is similar and yet different from phentermine  Discussed dosing and when and how to take it and the lifestyle changes that have to be done along with it for long term successful weight loss  Reassured patient that none of her chest pain sounds to be cardiovascular in origin. Her weight is actually a much bigger risk factor for future heart disease.    She will be having close monitoring of her medication use with vitals and appts for exams. She has normal BP and well controlled cholesterol on meds and this will aid her in making the other healthy choices she needs to make  Encouraged to use her daughter's help/guidance  Reviewed her vitamins. She is getting about 1/2 the calcium and vitamin D that I would recommend from it so could do it BID. However she is getting a lot of B vitamins and other herbs/supplements that I've never even heard of. Very unlikely to be of any true benefit to her and likely not worth the cost but doesn't appear to be unsafe so can continue if she wants  Given one month of vyvanse at starting dose of 40mg and will f/u within 30 days.    Oliva Lovell MD  [facility] FOR [facility]

## 2020-01-06 ENCOUNTER — OFFICE VISIT (OUTPATIENT)
Dept: OBGYN | Facility: CLINIC | Age: 65
End: 2020-01-06
Payer: COMMERCIAL

## 2020-01-06 VITALS
HEIGHT: 60 IN | BODY MASS INDEX: 39.82 KG/M2 | WEIGHT: 202.8 LBS | SYSTOLIC BLOOD PRESSURE: 116 MMHG | HEART RATE: 74 BPM | DIASTOLIC BLOOD PRESSURE: 60 MMHG

## 2020-01-06 DIAGNOSIS — F50.819 BINGE EATING DISORDER: ICD-10-CM

## 2020-01-06 DIAGNOSIS — E66.01 MORBID OBESITY (H): ICD-10-CM

## 2020-01-06 PROCEDURE — 99214 OFFICE O/P EST MOD 30 MIN: CPT | Performed by: OBSTETRICS & GYNECOLOGY

## 2020-01-06 RX ORDER — LISDEXAMFETAMINE DIMESYLATE 40 MG/1
40 CAPSULE ORAL EVERY MORNING
Qty: 30 CAPSULE | Refills: 0 | Status: SHIPPED | OUTPATIENT
Start: 2020-01-06 | End: 2020-10-09

## 2020-01-06 ASSESSMENT — MIFFLIN-ST. JEOR: SCORE: 1396.39

## 2020-01-08 ENCOUNTER — TELEPHONE (OUTPATIENT)
Dept: OBGYN | Facility: CLINIC | Age: 65
End: 2020-01-08

## 2020-01-08 NOTE — TELEPHONE ENCOUNTER
Prior Authorization Retail Medication Request    Medication/Dose: lisdexamfetamine (VYVANSE) 40 MG capsule  ICD code (if different than what is on RX):    Previously Tried and Failed:  Phentermine 30mg  Rationale:  Per Office Visit 1/6/20    Patient has plateau'd on her phentermine and in addition to that is not taking it consistently and regularly partly due to worry about risks with it and partly b/c will want to eat more or snack more  Really wearing off later in the evening and then will snack and binge junk food without even being aware of it which is definitely in the class of binge eating and impulse control lack  Discussed with her vyvanse and how it works and how it is similar and yet different from phentermine    Insurance Name:  [employer]  Insurance ID:  [id number]

## 2020-01-09 NOTE — TELEPHONE ENCOUNTER
Central Prior Authorization Team   Phone: [phone]      PA Initiation    Medication: lisdexamfetamine (VYVANSE) 40 MG capsule-Initiated  Insurance Company: [employer] - Phone [phone] Fax [phone]  Pharmacy Filling the Rx: [id number] - [place], [place] - [address] AT [facility] [address]  Filling Pharmacy Phone: [phone]  Filling Pharmacy Fax:    Start Date: 1/9/2020

## 2020-01-09 NOTE — TELEPHONE ENCOUNTER
Prior Authorization Approval    Authorization Effective Date: 1/1/2020  Authorization Expiration Date: 1/30/2021  Medication: lisdexamfetamine (VYVANSE) 40 MG capsule-APPROVED  Approved Dose/Quantity:   Reference #:     Insurance Company: [employer] - Phone [phone] Fax [phone]  Expected CoPay:       CoPay Card Available:      Foundation Assistance Needed:    Which Pharmacy is filling the prescription (Not needed for infusion/clinic administered): [id number] - [place] [address] AT [facility] [address]  Pharmacy Notified: Yes  Patient Notified: No    Pharmacy will notify patient when medication is ready.

## 2020-03-03 DIAGNOSIS — I10 ESSENTIAL HYPERTENSION: ICD-10-CM

## 2020-03-03 RX ORDER — LISINOPRIL 20 MG/1
TABLET ORAL
Qty: 30 TABLET | Refills: 0 | Status: SHIPPED | OUTPATIENT
Start: 2020-03-03 | End: 2020-06-28

## 2020-03-03 NOTE — TELEPHONE ENCOUNTER
"Requested Prescriptions   Pending Prescriptions Disp Refills     lisinopril (ZESTRIL) 20 MG tablet [Pharmacy Med Name: LISINOPRIL 20MG TABLETS] 90 tablet 3     Sig: TAKE 1 TABLET(20 MG) BY MOUTH DAILY       ACE Inhibitors (Including Combos) Protocol Failed - 3/3/2020  5:45 AM        Failed - Normal serum creatinine on file in past 12 months     Recent Labs   Lab Test 07/16/18  0740   CR 0.70             Failed - Normal serum potassium on file in past 12 months     Recent Labs   Lab Test 07/16/18  0740   POTASSIUM 4.2             Passed - Blood pressure under 140/90 in past 12 months     BP Readings from Last 3 Encounters:   01/06/20 116/60   09/18/19 92/60   07/24/19 100/68                 Passed - Recent (12 mo) or future (30 days) visit within the authorizing provider's specialty     Patient has had an office visit with the authorizing provider or a provider within the authorizing providers department within the previous 12 mos or has a future within next 30 days. See \"Patient Info\" tab in inbasket, or \"Choose Columns\" in Meds & Orders section of the refill encounter.              Passed - Medication is active on med list        Passed - Patient is age 18 or older        Passed - No active pregnancy on record        Passed - No positive pregnancy test within past 12 months        Last Written Prescription Date:  4/24/19  Last Fill Quantity: 90,  # refills: 3   Last office visit: 1/6/2020 with prescribing provider:  Dr Lovell   Annual 4/24/19    Future Office Visit:   Next 5 appointments (look out 90 days)    Mar 16, 2020 11:20 AM CDT  Office Visit with Oliva Lovell MD  [facility] ([facility]) [address] [id number] [phone]         Medication is being filled for 1 time refill only due to:  Patient needs to be seen because for annual by 4/24/20.   Has apt scheduled and notes say possible yearly.    Danyelle Dickson, RN on 3/3/2020 at " 8:47 AM

## 2020-06-01 RX ORDER — PHENTERMINE HYDROCHLORIDE 30 MG/1
CAPSULE ORAL
Qty: 30 CAPSULE | OUTPATIENT
Start: 2020-06-01

## 2020-06-01 NOTE — TELEPHONE ENCOUNTER
Sent message to Dr Lovell to advise how to proceed when pt calls back:    Oliva Lovell MD  P We Triage    Caller: Unspecified (Today,  9:11 AM)               I can get her in on 6/17 at some point for a visit and then refill her if appropriate     AJ      Called pt and left detailed vm instructing to schedule an OV and Rx will be reviewed and refilled at that time.    Refused this refill request.  Danyelle Dickson RN on 6/1/2020 at 3:57 PM

## 2020-06-01 NOTE — TELEPHONE ENCOUNTER
phentermine (ADIPEX-P) 30 MG capsule       Last Written Prescription Date:  12/6/19  Last Fill Quantity: 30,   # refills: 0  Last Office Visit: DR Lovell 1/6/20 Given one month of vyvanse at starting dose of 40mg and will f/u within 30 days.   Oliva Lovell MD    Future Office visit:    Next 5 appointments (look out 90 days)    Aug 07, 2020  9:00 AM CDT  PHYSICAL with Oliva Lovell MD  [facility] [address] [place] [id number] [phone]         Attempted to call pt to discuss as plan was Vyvanse and return in 30 days back in January. No f/u was made and now requesting Phentermine.  LMTCB    Routing to Dr Lovell as FYI. Recommend OV or phone visit to discuss a plan?    Danyelle Dickson, RN on 6/1/2020 at 1:35 PM

## 2020-06-17 ENCOUNTER — ANCILLARY PROCEDURE (OUTPATIENT)
Dept: MAMMOGRAPHY | Facility: CLINIC | Age: 65
End: 2020-06-17
Payer: COMMERCIAL

## 2020-06-17 DIAGNOSIS — Z12.31 VISIT FOR SCREENING MAMMOGRAM: ICD-10-CM

## 2020-06-17 PROCEDURE — 77067 SCR MAMMO BI INCL CAD: CPT | Mod: TC

## 2020-06-28 DIAGNOSIS — I10 ESSENTIAL HYPERTENSION: ICD-10-CM

## 2020-06-28 RX ORDER — LISINOPRIL 20 MG/1
TABLET ORAL
Qty: 30 TABLET | Refills: 1 | Status: SHIPPED | OUTPATIENT
Start: 2020-06-28 | End: 2020-09-02

## 2020-06-29 NOTE — TELEPHONE ENCOUNTER
"Requested Prescriptions   Pending Prescriptions Disp Refills     lisinopril (ZESTRIL) 20 MG tablet [Pharmacy Med Name: LISINOPRIL 20MG TABLETS] 30 tablet 0     Sig: TAKE 1 TABLET(20 MG) BY MOUTH DAILY       ACE Inhibitors (Including Combos) Protocol Failed - 6/28/2020 11:09 AM        Failed - Normal serum creatinine on file in past 12 months     Recent Labs   Lab Test 07/16/18  0740   CR 0.70       Ok to refill medication if creatinine is low          Failed - Normal serum potassium on file in past 12 months     Recent Labs   Lab Test 07/16/18  0740   POTASSIUM 4.2             Passed - Blood pressure under 140/90 in past 12 months     BP Readings from Last 3 Encounters:   01/06/20 116/60   09/18/19 92/60   07/24/19 100/68                 Passed - Recent (12 mo) or future (30 days) visit within the authorizing provider's specialty     Patient has had an office visit with the authorizing provider or a provider within the authorizing providers department within the previous 12 mos or has a future within next 30 days. See \"Patient Info\" tab in inbasket, or \"Choose Columns\" in Meds & Orders section of the refill encounter.              Passed - Medication is active on med list        Passed - Patient is age 18 or older        Passed - No active pregnancy on record        Passed - No positive pregnancy test within past 12 months           Last Written Prescription Date:  3/3/20  Last Fill Quantity: 30,  # refills: 0   Last office visit: 1/6/2020 with prescribing provider:  Dr Lovell   Future Office Visit:   Next 5 appointments (look out 90 days)    Aug 07, 2020  9:00 AM CDT  PHYSICAL with Oliva Lovell MD  [facility] [place] ([facility]) [address] [address] [phone]         Last Annual exam 4/24/19; Has annual scheduled.  Prescription approved per [facility] Refill Protocol.  Danyelle Dickson RN on 6/28/2020 at 8:01 PM    "

## 2020-06-30 ENCOUNTER — TELEPHONE (OUTPATIENT)
Dept: OBGYN | Facility: CLINIC | Age: 65
End: 2020-06-30

## 2020-06-30 NOTE — TELEPHONE ENCOUNTER
Attempted call to patient, went straight to voicemail. Left message to call back to schedule visit specified by Dr. Lovell.

## 2020-06-30 NOTE — TELEPHONE ENCOUNTER
Judie called her twice yesterday during the power outage, on my phone through [facility] so I know she was called.    Either way, can we do a phone visit at 220 on 7/3? The patient currently in that spot is being induced tonight/tomorrow

## 2020-06-30 NOTE — TELEPHONE ENCOUNTER
Patient requesting prescription for vyvanse. Missed phone visit yesterday, states she never received call despite documentation otherwise. Knows that it had been previously denied. Has appt scheduled 8/7/2020

## 2020-09-01 DIAGNOSIS — I10 ESSENTIAL HYPERTENSION: ICD-10-CM

## 2020-09-02 RX ORDER — LISINOPRIL 20 MG/1
TABLET ORAL
Qty: 30 TABLET | Refills: 1 | Status: SHIPPED | OUTPATIENT
Start: 2020-09-02 | End: 2020-10-09

## 2020-09-02 NOTE — TELEPHONE ENCOUNTER
"Requested Prescriptions   Pending Prescriptions Disp Refills     lisinopril (ZESTRIL) 20 MG tablet [Pharmacy Med Name: LISINOPRIL 20MG TABLETS] 30 tablet 1     Sig: TAKE 1 TABLET(20 MG) BY MOUTH DAILY       ACE Inhibitors (Including Combos) Protocol Failed - 9/1/2020  5:03 PM        Failed - Normal serum creatinine on file in past 12 months     Recent Labs   Lab Test 07/16/18  0740   CR 0.70       Ok to refill medication if creatinine is low          Failed - Normal serum potassium on file in past 12 months     Recent Labs   Lab Test 07/16/18  0740   POTASSIUM 4.2             Passed - Blood pressure under 140/90 in past 12 months     BP Readings from Last 3 Encounters:   01/06/20 116/60   09/18/19 92/60   07/24/19 100/68                 Passed - Recent (12 mo) or future (30 days) visit within the authorizing provider's specialty     Patient has had an office visit with the authorizing provider or a provider within the authorizing providers department within the previous 12 mos or has a future within next 30 days. See \"Patient Info\" tab in inbasket, or \"Choose Columns\" in Meds & Orders section of the refill encounter.              Passed - Medication is active on med list        Passed - Patient is age 18 or older        Passed - No active pregnancy on record        Passed - No positive pregnancy test within past 12 months           Last Written Prescription Date:  6/28/20  Last Fill Quantity: 30,  # refills: 1   Last office visit: 1/6/2020 with prescribing provider:  Dr Lovell   Future Office Visit:   Next 5 appointments (look out 90 days)    Oct 09, 2020 10:30 AM CDT  PHYSICAL with Oliva Lovell MD  [facility] [place] ([facility]) [address] [address] [phone]         Prescription approved per [facility] Refill Protocol.  Danyelle Dickson RN on 9/2/2020 at 8:54 AM            "

## 2020-09-10 ENCOUNTER — TELEPHONE (OUTPATIENT)
Dept: OBGYN | Facility: CLINIC | Age: 65
End: 2020-09-10

## 2020-09-11 NOTE — TELEPHONE ENCOUNTER
Called pt and left detailed vm with information - Nas will see her 10/9 and rx at that time.  Danyelle Dickson RN on 9/11/2020 at 12:56 PM

## 2020-09-11 NOTE — TELEPHONE ENCOUNTER
1/6/20 last OV  6/29 no show  8/7 cancelled apt    Routing refill request to Dr Lovell - she scheduled an office visit for 10/9.  Please advise on her refill request. Wait to actually be seen?    Danyelle Dickson RN on 9/11/2020 at 10:36 AM

## 2020-09-11 NOTE — TELEPHONE ENCOUNTER
Milton. Will need to wait until 10/9. Haven't seen her in too long for a stimulant/controlled substance refill

## 2020-10-08 NOTE — PROGRESS NOTES
"Ana is a 65 year old  female who presents for annual exam.     Besides routine health maintenance,  she would like to discuss left sided chest pain that happens when laying down and at night, not sure if it is heart burn or not.    HPI:  The patient's PCP is Dr. Oliva Lovell MD.      Patient was doing phentermine for weight loss and had initially good success. Was exercising almost daily and walking her dog in addition. 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 Had lost 25+# leading up to her daughter's wedding and feeling really good.  Continued to do those things and then hit a plateau on phent 30mg and so decided to switch to vyvanse for B.E.D. did one month of it and felt like it caused her to be a little less agitated and heart racing compared to phent. However was to return for f/u appointment in a month and that was in  and she never came back in. Then with covid and needing a yearly she got delayed even more.  Now not working out other than walking her dog. Was going to [employer] and doing boot camp and now not feeling comfortable going to the gym. Her apt [facility] gym is also open but only a few machines and not as motivating as the classes had been.  Not eating nearly as healthy or structured. Now that working from home feels like she's grazing all the time and less healthy choices or structured meals  Now has gained back 23# since here in January and feeling miserable  Took a walk with her daughter and just a slight incline and was struggling to catch her breath. Even just sitting now and feels like hard to breathe  Her weight makes her depressed, more sluggish, and now has added to anxiety about dying alone in her appointment, etc    Has been having left sided chest pain at night. Goes to bed fine and then wakes up with severe pain in her upper left chest and neck and arm. \"eating tums like crazy\" and it does help but taking a lot of them. Starting to worry " it's her heart and that she maybe needs to see an internist instead of just seeing me, etc  No chest pain or tachy with exertion other than the S.O.B that is just from  Not exercising and being out of shape. Also then wonders if it's breast pain as right at the top of her breast on the left.    No significant vasomotor sx. No vaginal discharge, bleeding, skin issues. Not s.a for many years  Has some incontinence if really has to go to the bathroom or a really hard sneeze but not significant and not frequent    Last dexa  and osteoporosis mostly in the forearm and not hips or spine  Had her mammo in  and normal  Is planning to work at least one more year so hasn't started to look at medicare yet. May not get medicare if continues to work and has insurance through them but wondering about PCP and gyn visits going forward    GYNECOLOGIC HISTORY:    No LMP recorded. Patient is postmenopausal.    Her current contraception method is: menopause.  She  reports that she has never smoked. She has never used smokeless tobacco.    Patient is not sexually active.  STD testing offered?  Declined    Last PHQ-9 score on record =   PHQ-9 SCORE 10/9/2020   PHQ-9 Total Score 5     Last GAD7 score on record =   SAMIR-7 SCORE 10/9/2020   Total Score 4     Alcohol Score = 1    HEALTH MAINTENANCE:  Cholesterol:   Recent Labs   Lab Test 18  0946 16  1357   CHOL 173 184   HDL 64 54   LDL 82 93   TRIG 137 187*   FBS 86      Last Mammo: 20, Result: Normal, Next Mammo:   Pap:   Lab Results   Component Value Date    PAP NIL NEG-HPV 2018     Colonoscopy: 17, Result: Normal, repeat 5 years, Next Colonoscopy:   Dexa: 18    Health maintenance updated:  yes    HISTORY:  OB History    Para Term  AB Living   4 3 3 0 1 3   SAB TAB Ectopic Multiple Live Births   1 0 0 0 3      # Outcome Date GA Lbr Jesus/2nd Weight Sex Delivery Anes PTL Lv   4 Term 94 40w0d  3.685 kg (8 lb 2 oz) F  Vag-Vacuum   NATASHA   3 Term 07/16/91 42w0d  3.657 kg (8 lb 1 oz) F Vag-Vacuum   NATASHA   2 Term 01/01/89    M    NATASHA   1 SAB                Patient Active Problem List   Diagnosis     Essential hypertension     Osteoporosis, senile     Morbid obesity (H)     Past Surgical History:   Procedure Laterality Date     BIOPSY BREAST  11/2001, 07/07/2014    excisional left side-B9 fibrocystic change. stereotactic bx left at 2 oclock for calcs. path-atypical ductal proliferation     COLONOSCOPY N/A 1/19/2017    Procedure: COMBINED COLONOSCOPY, SINGLE OR MULTIPLE BIOPSY/POLYPECTOMY BY BIOPSY;  Surgeon: Jerald Grant MD;  Location:  GI     LITHOTRIPSY  2007     MAMMOPLASTY REDUCTION  11/07/2014    Dr. Trujillo and Reji. had atypical cells noted on a bx but also wanted reduction so done together.     PARATHYROIDECTOMY Right 8/21/2015    Procedure: PARATHYROIDECTOMY;  Surgeon: Lynnette Sewell MD;  Location: [facility] OR     TUBAL LIGATION  12/14/1994    post partum      Social History     Tobacco Use     Smoking status: Never Smoker     Smokeless tobacco: Never Used   Substance Use Topics     Alcohol use: Yes     Frequency: Monthly or less     Drinks per session: 1 or 2     Binge frequency: Never      Problem (# of Occurrences) Relation (Name,Age of Onset)    Breast Cancer (1) Maternal Grandmother    Cerebrovascular Disease (2) Mother, Paternal Grandmother    Colon Cancer (1) Father (90)    Coronary Artery Disease (1) Mother    Diabetes (1) Mother    Hyperlipidemia (1) Mother    Hypertension (1) Mother    No Known Problems (4) Sister, Brother, Maternal Grandfather, Other    Other Cancer (1) Maternal Grandmother            Current Outpatient Medications   Medication Sig     calcium carb 1250 mg, 500 mg [place],/vitamin D 200 units (OSCAL WITH D) 500-200 MG-UNIT per tablet Take 2 tablets by mouth every 12 hours     IRON PO      lisdexamfetamine (VYVANSE) 40 MG capsule Take 1 capsule (40 mg) by mouth every morning     lisinopril  (ZESTRIL) 20 MG tablet Take 1 tablet (20 mg) by mouth daily     phentermine (ADIPEX-P) 30 MG capsule TAKE 1 CAPSULE BY MOUTH EVERY MORNING     No current facility-administered medications for this visit.      No Known Allergies    Past medical, surgical, social and family histories were reviewed and updated in EPIC.    ROS:   12 point review of systems negative other than symptoms noted below or in the HPI.  No urinary frequency or dysuria, bladder or kidney problems, POSITIVE for:, incontinence, left chest/neck pain    EXAM:  /74   Ht 1.524 m (5')   Wt 101.5 kg (223 lb 12.8 oz)   BMI 43.71 kg/m     BMI: Body mass index is 43.71 kg/m .    PHYSICAL EXAM:  Constitutional:   Appearance: Well nourished, well developed, alert, in no acute distress  Neck:  Lymph Nodes:  No lymphadenopathy present    Thyroid:  Gland size normal, nontender, no nodules or masses present  on palpation  Chest:  Respiratory Effort:  Breathing unlabored  Cardiovascular:    Heart: Auscultation:  Regular rate, normal rhythm, no murmurs present  Breasts: Palpation of Breasts and Axillae:  No masses present on palpation, no breast tenderness., Axillary Lymph Nodes:  No lymphadenopathy present., No nodularity, asymmetry or nipple discharge bilaterally. and DENSE BREAST TISSUE BILATERALLY IN THE UPPER OUTER QUADRANTS. SLIGHTLY TENDER OVER THAT AREA IN UPPER LEFT BREAST BUT NO MASSES FELT. BREAST REDUX SCARS PRESENT  Gastrointestinal:   Abdominal Examination:  Abdomen nontender to palpation, tone normal without rigidity or guarding, no masses present, umbilicus without lesions   Liver and Spleen:  No hepatomegaly present, liver nontender to palpation    Hernias:  No hernias present  Lymphatic: Lymph Nodes:  No other lymphadenopathy present  Skin:  General Inspection:  No rashes present, no lesions present, no areas of  discoloration  Neurologic:    Mental Status:  Oriented X3.  Normal strength and tone, sensory exam                grossly  normal, mentation intact and speech normal.    Psychiatric:   Mentation appears normal and affect normal/bright.         Pelvic Exam:  External Genitalia:     Normal appearance for age, no discharge present, no tenderness present, no inflammatory lesions present, color normal  Vagina:     Normal vaginal vault without central or paravaginal defects, no discharge present, no inflammatory lesions present, no masses present  Bladder:     Nontender to palpation  Urethra:   Urethral Body:  Urethra palpation normal, urethra structural support normal   Urethral Meatus:  No erythema or lesions present  Cervix:     Appearance healthy, no lesions present, nontender to palpation, no bleeding present  Uterus:     Uterus: firm, normal sized and nontender, midplane in position.   Adnexa:     No adnexal tenderness present, no adnexal masses present  Perineum:     Perineum within normal limits, no evidence of trauma, no rashes or skin lesions present  Anus:     Anus within normal limits, no hemorrhoids present  Inguinal Lymph Nodes:     No lymphadenopathy present  Pubic Hair:     Normal pubic hair distribution for age  Genitalia and Groin:     No rashes present, no lesions present, no areas of discoloration, no masses present      COUNSELING:   Reviewed preventive health counseling, as reflected in patient instructions  Special attention given to:        Regular exercise       Healthy diet/nutrition       Osteoporosis Prevention/Bone Health    BMI: Body mass index is 43.71 kg/m .  Weight management plan: SEE BELOW    ASSESSMENT:  65 year old female with satisfactory annual exam.    ICD-10-CM    1. Encounter for gynecological examination without abnormal finding  Z01.419    2. Essential hypertension  I10 lisinopril (ZESTRIL) 20 MG tablet   3. BMI 39.0-39.9,adult  Z68.39 lisdexamfetamine (VYVANSE) 40 MG capsule   4. Binge eating disorder  F50.81 lisdexamfetamine (VYVANSE) 40 MG capsule   5. Encounter for lipid screening for  cardiovascular disease  Z13.220 Lipid panel reflex to direct LDL Fasting    Z13.6    6. Screening for metabolic disorder  Z13.228 Comprehensive metabolic panel   7. Encounter for vitamin deficiency screening  Z13.21 Vitamin D Deficiency   8. Screening for diabetes mellitus  Z13.1 Hemoglobin A1c   9. Other chest pain  R07.89 CRP cardiac risk   10. Screening for disorder of blood and blood-forming organs  Z13.0 CBC with platelets   11. Gastroesophageal reflux disease, unspecified whether esophagitis present  K21.9        PLAN:  Pap is UTD and will repeat once more next year at age 66 as it would be her last one  mammo UTD and will try to sync them to same day as her appointment with me  BP is 130/80s so may need to adjust her BP meds. However will refill her 20mg lisinopril for now and hopefully with weight loss this coming month or two this will improve. If it doesn't will adjust it. If made worse by vyvanse may have to stop vyvanse at that point    Will do future lab orders for fasting labs when rtns for her vyvanse f/u as not fasting this afternoon  Plan dexa again next year as osteopenia in hips only and osteoporosis is only in forearm    Discussed her weight, her eating and exercise patterns, phentermine and vyvanse and their indications, r/b/a/se  Will do rx for vyvanse 40m and then has to see me in person in 1 month for f/u so can assess how it's working, side effects and BP  Patient agrees to this. Will start to change diet and exercise patterns as well    Discussed her chest pain. Does not sound cardiac in nature as it is at rest and tums do help  Encouraged prilosec 20mg daily for the next month  With central weight GERD is much more likely and with weight loss may improve and can scale back to prn prilosec or even tums only  Discussed cardiac nature chest pain si/sx and if has them needs to go straight to ER and patient agrees      Spent an additional 15 min with the patient 100% of which was in face to  face counseling time to address weight and chest pain and GERD  Discussed that at her age should have a PCP but gyn also and discussed private insurance vs medicare coverage of future appts in those two settings    Oliva Lovell MD

## 2020-10-09 ENCOUNTER — OFFICE VISIT (OUTPATIENT)
Dept: OBGYN | Facility: CLINIC | Age: 65
End: 2020-10-09
Payer: COMMERCIAL

## 2020-10-09 VITALS
HEIGHT: 60 IN | BODY MASS INDEX: 43.94 KG/M2 | SYSTOLIC BLOOD PRESSURE: 130 MMHG | DIASTOLIC BLOOD PRESSURE: 74 MMHG | WEIGHT: 223.8 LBS

## 2020-10-09 DIAGNOSIS — K21.9 GASTROESOPHAGEAL REFLUX DISEASE, UNSPECIFIED WHETHER ESOPHAGITIS PRESENT: ICD-10-CM

## 2020-10-09 DIAGNOSIS — Z13.0 SCREENING FOR DISORDER OF BLOOD AND BLOOD-FORMING ORGANS: ICD-10-CM

## 2020-10-09 DIAGNOSIS — Z13.220 ENCOUNTER FOR LIPID SCREENING FOR CARDIOVASCULAR DISEASE: ICD-10-CM

## 2020-10-09 DIAGNOSIS — Z13.228 SCREENING FOR METABOLIC DISORDER: ICD-10-CM

## 2020-10-09 DIAGNOSIS — Z13.21 ENCOUNTER FOR VITAMIN DEFICIENCY SCREENING: ICD-10-CM

## 2020-10-09 DIAGNOSIS — Z13.1 SCREENING FOR DIABETES MELLITUS: ICD-10-CM

## 2020-10-09 DIAGNOSIS — F50.819 BINGE EATING DISORDER: ICD-10-CM

## 2020-10-09 DIAGNOSIS — Z13.6 ENCOUNTER FOR LIPID SCREENING FOR CARDIOVASCULAR DISEASE: ICD-10-CM

## 2020-10-09 DIAGNOSIS — I10 ESSENTIAL HYPERTENSION: ICD-10-CM

## 2020-10-09 DIAGNOSIS — R07.89 OTHER CHEST PAIN: ICD-10-CM

## 2020-10-09 DIAGNOSIS — Z01.419 ENCOUNTER FOR GYNECOLOGICAL EXAMINATION WITHOUT ABNORMAL FINDING: Primary | ICD-10-CM

## 2020-10-09 PROCEDURE — 99213 OFFICE O/P EST LOW 20 MIN: CPT | Mod: 25 | Performed by: OBSTETRICS & GYNECOLOGY

## 2020-10-09 PROCEDURE — 99396 PREV VISIT EST AGE 40-64: CPT | Performed by: OBSTETRICS & GYNECOLOGY

## 2020-10-09 RX ORDER — LISINOPRIL 20 MG/1
20 TABLET ORAL DAILY
Qty: 90 TABLET | Refills: 4 | Status: SHIPPED | OUTPATIENT
Start: 2020-10-09 | End: 2020-11-04

## 2020-10-09 RX ORDER — LISDEXAMFETAMINE DIMESYLATE 40 MG/1
40 CAPSULE ORAL EVERY MORNING
Qty: 30 CAPSULE | Refills: 0 | Status: SHIPPED | OUTPATIENT
Start: 2020-10-09 | End: 2020-11-11

## 2020-10-09 RX ORDER — PHENTERMINE HYDROCHLORIDE 30 MG/1
CAPSULE ORAL
Qty: 30 CAPSULE | Refills: 3 | Status: CANCELLED | OUTPATIENT
Start: 2020-10-09

## 2020-10-09 SDOH — HEALTH STABILITY: MENTAL HEALTH: HOW MANY STANDARD DRINKS CONTAINING ALCOHOL DO YOU HAVE ON A TYPICAL DAY?: 1 OR 2

## 2020-10-09 SDOH — HEALTH STABILITY: MENTAL HEALTH: HOW OFTEN DO YOU HAVE A DRINK CONTAINING ALCOHOL?: MONTHLY OR LESS

## 2020-10-09 SDOH — HEALTH STABILITY: MENTAL HEALTH: HOW OFTEN DO YOU HAVE 6 OR MORE DRINKS ON ONE OCCASION?: NEVER

## 2020-10-09 ASSESSMENT — ANXIETY QUESTIONNAIRES
7. FEELING AFRAID AS IF SOMETHING AWFUL MIGHT HAPPEN: SEVERAL DAYS
6. BECOMING EASILY ANNOYED OR IRRITABLE: NOT AT ALL
2. NOT BEING ABLE TO STOP OR CONTROL WORRYING: SEVERAL DAYS
GAD7 TOTAL SCORE: 4
3. WORRYING TOO MUCH ABOUT DIFFERENT THINGS: SEVERAL DAYS
5. BEING SO RESTLESS THAT IT IS HARD TO SIT STILL: NOT AT ALL
IF YOU CHECKED OFF ANY PROBLEMS ON THIS QUESTIONNAIRE, HOW DIFFICULT HAVE THESE PROBLEMS MADE IT FOR YOU TO DO YOUR WORK, TAKE CARE OF THINGS AT HOME, OR GET ALONG WITH OTHER PEOPLE: NOT DIFFICULT AT ALL
1. FEELING NERVOUS, ANXIOUS, OR ON EDGE: SEVERAL DAYS

## 2020-10-09 ASSESSMENT — MIFFLIN-ST. JEOR: SCORE: 1486.65

## 2020-10-09 ASSESSMENT — PATIENT HEALTH QUESTIONNAIRE - PHQ9
SUM OF ALL RESPONSES TO PHQ QUESTIONS 1-9: 5
5. POOR APPETITE OR OVEREATING: NOT AT ALL

## 2020-10-10 ASSESSMENT — ANXIETY QUESTIONNAIRES: GAD7 TOTAL SCORE: 4

## 2020-11-04 DIAGNOSIS — I10 ESSENTIAL HYPERTENSION: ICD-10-CM

## 2020-11-04 RX ORDER — LISINOPRIL 20 MG/1
20 TABLET ORAL DAILY
Qty: 30 TABLET | Refills: 0 | Status: ON HOLD | OUTPATIENT
Start: 2020-11-04 | End: 2021-06-24

## 2020-11-04 NOTE — TELEPHONE ENCOUNTER
"Requested Prescriptions   Pending Prescriptions Disp Refills     lisinopril (ZESTRIL) 20 MG tablet 90 tablet 4     Sig: Take 1 tablet (20 mg) by mouth daily       ACE Inhibitors (Including Combos) Protocol Failed - 11/4/2020  2:50 PM        Failed - Normal serum creatinine on file in past 12 months     Recent Labs   Lab Test 07/16/18  0740   CR 0.70       Ok to refill medication if creatinine is low          Failed - Normal serum potassium on file in past 12 months     Recent Labs   Lab Test 07/16/18  0740   POTASSIUM 4.2             Passed - Blood pressure under 140/90 in past 12 months     BP Readings from Last 3 Encounters:   10/09/20 130/74   01/06/20 116/60   09/18/19 92/60                 Passed - Recent (12 mo) or future (30 days) visit within the authorizing provider's specialty     Patient has had an office visit with the authorizing provider or a provider within the authorizing providers department within the previous 12 mos or has a future within next 30 days. See \"Patient Info\" tab in inbasket, or \"Choose Columns\" in Meds & Orders section of the refill encounter.              Passed - Medication is active on med list        Passed - Patient is age 18 or older        Passed - No active pregnancy on record        Passed - No positive pregnancy test within past 12 months           Last Written Prescription Date:  10/9/20  Last Fill Quantity: 90,  # refills: 4   Last office visit: 10/9/2020 with prescribing provider:  Dr. Lovell     Future Office Visit:   Next 5 appointments (look out 90 days)    Nov 11, 2020  3:30 PM  Office Visit with Oliva Lovell MD  [facility] [place] ([facility] [place]) [address] [place] [address] [phone]         Refill sent  Becky Gunn RN on 11/4/2020 at 3:23 PM        "

## 2020-11-05 DIAGNOSIS — I10 ESSENTIAL HYPERTENSION: ICD-10-CM

## 2020-11-05 RX ORDER — LISINOPRIL 20 MG/1
20 TABLET ORAL DAILY
Qty: 30 TABLET | Refills: 0 | OUTPATIENT
Start: 2020-11-05

## 2020-11-05 NOTE — TELEPHONE ENCOUNTER
"Requested Prescriptions   Pending Prescriptions Disp Refills     lisinopril (ZESTRIL) 20 MG tablet 30 tablet 0     Sig: Take 1 tablet (20 mg) by mouth daily       ACE Inhibitors (Including Combos) Protocol Failed - 11/5/2020  9:14 AM        Failed - Normal serum creatinine on file in past 12 months     Recent Labs   Lab Test 07/16/18  0740   CR 0.70       Ok to refill medication if creatinine is low          Failed - Normal serum potassium on file in past 12 months     Recent Labs   Lab Test 07/16/18  0740   POTASSIUM 4.2             Passed - Blood pressure under 140/90 in past 12 months     BP Readings from Last 3 Encounters:   10/09/20 130/74   01/06/20 116/60   09/18/19 92/60                 Passed - Recent (12 mo) or future (30 days) visit within the authorizing provider's specialty     Patient has had an office visit with the authorizing provider or a provider within the authorizing providers department within the previous 12 mos or has a future within next 30 days. See \"Patient Info\" tab in inbasket, or \"Choose Columns\" in Meds & Orders section of the refill encounter.              Passed - Medication is active on med list        Passed - Patient is age 18 or older        Passed - No active pregnancy on record        Passed - No positive pregnancy test within past 12 months           Last Written Prescription Date:  11/04/20  Last Fill Quantity: 30,  # refills: 0   Last office visit: 10/9/2020 with prescribing provider:  Nas   Future Office Visit:   Next 5 appointments (look out 90 days)    Nov 11, 2020  3:30 PM  Office Visit with Oliva Lovell MD  [facility] [place] ([facility] [place]) [address] [place] [id number] [phone]                 "

## 2020-11-05 NOTE — TELEPHONE ENCOUNTER
"Refill request refused due to :   [x] Refills available at pharmacy.  Request sent back to pharmacy as \"duplicate\"  [] Patient report no longer needing it  [] Medication discontinued       Becky Eller RN on 11/5/2020 at 9:39 AM    "

## 2020-11-11 ENCOUNTER — OFFICE VISIT (OUTPATIENT)
Dept: OBGYN | Facility: CLINIC | Age: 65
End: 2020-11-11
Payer: COMMERCIAL

## 2020-11-11 VITALS
WEIGHT: 215 LBS | SYSTOLIC BLOOD PRESSURE: 118 MMHG | HEIGHT: 60 IN | BODY MASS INDEX: 42.21 KG/M2 | DIASTOLIC BLOOD PRESSURE: 60 MMHG

## 2020-11-11 DIAGNOSIS — F50.819 BINGE EATING DISORDER: Primary | ICD-10-CM

## 2020-11-11 DIAGNOSIS — Z13.228 SCREENING FOR METABOLIC DISORDER: ICD-10-CM

## 2020-11-11 DIAGNOSIS — K21.9 GASTROESOPHAGEAL REFLUX DISEASE, UNSPECIFIED WHETHER ESOPHAGITIS PRESENT: ICD-10-CM

## 2020-11-11 DIAGNOSIS — Z13.1 SCREENING FOR DIABETES MELLITUS: ICD-10-CM

## 2020-11-11 DIAGNOSIS — R79.82 ELEVATED C-REACTIVE PROTEIN (CRP): Primary | ICD-10-CM

## 2020-11-11 DIAGNOSIS — Z13.220 ENCOUNTER FOR LIPID SCREENING FOR CARDIOVASCULAR DISEASE: ICD-10-CM

## 2020-11-11 DIAGNOSIS — Z13.6 ENCOUNTER FOR LIPID SCREENING FOR CARDIOVASCULAR DISEASE: ICD-10-CM

## 2020-11-11 DIAGNOSIS — Z13.21 ENCOUNTER FOR VITAMIN DEFICIENCY SCREENING: ICD-10-CM

## 2020-11-11 DIAGNOSIS — R07.89 OTHER CHEST PAIN: ICD-10-CM

## 2020-11-11 DIAGNOSIS — Z13.0 SCREENING FOR DISORDER OF BLOOD AND BLOOD-FORMING ORGANS: ICD-10-CM

## 2020-11-11 DIAGNOSIS — E66.01 MORBID OBESITY (H): ICD-10-CM

## 2020-11-11 LAB
ALBUMIN SERPL-MCNC: 3.5 G/DL (ref 3.4–5)
ALP SERPL-CCNC: 60 U/L (ref 40–150)
ALT SERPL W P-5'-P-CCNC: 27 U/L (ref 0–50)
ANION GAP SERPL CALCULATED.3IONS-SCNC: 5 MMOL/L (ref 3–14)
AST SERPL W P-5'-P-CCNC: 17 U/L (ref 0–45)
BILIRUB SERPL-MCNC: 0.9 MG/DL (ref 0.2–1.3)
BUN SERPL-MCNC: 13 MG/DL (ref 7–30)
CALCIUM SERPL-MCNC: 8.8 MG/DL (ref 8.5–10.1)
CHLORIDE SERPL-SCNC: 107 MMOL/L (ref 94–109)
CHOLEST SERPL-MCNC: 168 MG/DL
CO2 SERPL-SCNC: 25 MMOL/L (ref 20–32)
CREAT SERPL-MCNC: 0.66 MG/DL (ref 0.52–1.04)
CRP SERPL HS-MCNC: 10 MG/L
ERYTHROCYTE [DISTWIDTH] IN BLOOD BY AUTOMATED COUNT: 13.5 % (ref 10–15)
GFR SERPL CREATININE-BSD FRML MDRD: >90 ML/MIN/{1.73_M2}
GLUCOSE SERPL-MCNC: 95 MG/DL (ref 70–99)
HBA1C MFR BLD: 5.4 % (ref 0–5.6)
HCT VFR BLD AUTO: 36.9 % (ref 35–47)
HDLC SERPL-MCNC: 48 MG/DL
HGB BLD-MCNC: 11.9 G/DL (ref 11.7–15.7)
LDLC SERPL CALC-MCNC: 88 MG/DL
MCH RBC QN AUTO: 28.6 PG (ref 26.5–33)
MCHC RBC AUTO-ENTMCNC: 32.2 G/DL (ref 31.5–36.5)
MCV RBC AUTO: 89 FL (ref 78–100)
NONHDLC SERPL-MCNC: 120 MG/DL
PLATELET # BLD AUTO: 241 10E9/L (ref 150–450)
POTASSIUM SERPL-SCNC: 4 MMOL/L (ref 3.4–5.3)
PROT SERPL-MCNC: 7.6 G/DL (ref 6.8–8.8)
RBC # BLD AUTO: 4.16 10E12/L (ref 3.8–5.2)
SODIUM SERPL-SCNC: 137 MMOL/L (ref 133–144)
TRIGL SERPL-MCNC: 160 MG/DL
WBC # BLD AUTO: 6.4 10E9/L (ref 4–11)

## 2020-11-11 PROCEDURE — 85027 COMPLETE CBC AUTOMATED: CPT | Performed by: OBSTETRICS & GYNECOLOGY

## 2020-11-11 PROCEDURE — 80053 COMPREHEN METABOLIC PANEL: CPT | Performed by: OBSTETRICS & GYNECOLOGY

## 2020-11-11 PROCEDURE — 86141 C-REACTIVE PROTEIN HS: CPT | Performed by: OBSTETRICS & GYNECOLOGY

## 2020-11-11 PROCEDURE — 99213 OFFICE O/P EST LOW 20 MIN: CPT | Performed by: OBSTETRICS & GYNECOLOGY

## 2020-11-11 PROCEDURE — 83036 HEMOGLOBIN GLYCOSYLATED A1C: CPT | Performed by: OBSTETRICS & GYNECOLOGY

## 2020-11-11 PROCEDURE — 36415 COLL VENOUS BLD VENIPUNCTURE: CPT | Performed by: OBSTETRICS & GYNECOLOGY

## 2020-11-11 PROCEDURE — 80061 LIPID PANEL: CPT | Performed by: OBSTETRICS & GYNECOLOGY

## 2020-11-11 PROCEDURE — 82306 VITAMIN D 25 HYDROXY: CPT | Performed by: OBSTETRICS & GYNECOLOGY

## 2020-11-11 RX ORDER — LISDEXAMFETAMINE DIMESYLATE 40 MG/1
40 CAPSULE ORAL EVERY MORNING
Qty: 30 CAPSULE | Refills: 0 | Status: SHIPPED | OUTPATIENT
Start: 2020-11-11 | End: 2020-12-09

## 2020-11-11 ASSESSMENT — MIFFLIN-ST. JEOR: SCORE: 1446.73

## 2020-11-11 NOTE — PROGRESS NOTES
SUBJECTIVE:                                                   Ana Dupree is a 65 year old female who presents to clinic today for the following health issue(s):  Patient presents with:  Recheck Medication: Started Vyvanse 40mg one month ago. Patient feels this medication is working and has no complaints, patient was hoping to have lost a little bit more weight though.    Additional Information: Pt is 215# today, down 8 lbs. from 10/9.     HPI:  Here for a one month f/u on vyvanse after doing phentermine for quite a while. Had initially lost on phent and then plateau'd and wasn't following up regularly for appts to get refills.   When here last month was frustrated b/c had a lot of weight gain despite not eating late at night and going to sleep at 8pm. Eating salads for lunch and healthy small dinners.   However was working on site at [employer] before covid so was eating healthier as could buy single portion food/salads and much more active and moving. Now much more sedentary with working at home  Was going to a class at [employer] and exercising regularly. Would go to her gym in her apt complex and walk her dog a lot. Now isn't feeling comfortable to go to the gym or her buildings' gym so only walking the dog and knows that's part of it.  Decided to try vyvanse since phentermine had plateau'd  Is down 8 pounds in just about a month. Had hoped to lose more but had gained for months prior to that  Feeling more energetic and motivated to eat better, less snacking and cravings. Able to have smaller portions and make better food choices then she even was before.  Getting easier to just take walks and is less embarrassed to go back to the gym though still nervous d/t covid  Walking her dog more regularly and faster b/c it's easier  Has no chest pain or SOB or exertional sx  Was complaining of left side chest pain last time but mostly with resting and laying in bed. Sounded much more gerd than CV  Patient started on  prilosec 20mg daily since here last and sx are completely gone and much more reassured b/c worried it was her heart  Had fasting labs just before her appointment today  Some labs still pending and reviewed the ones that were back and normal    No LMP recorded. Patient is postmenopausal.    Patient is not sexually active, .  Using none for contraception.    reports that she has never smoked. She has never used smokeless tobacco.    STD testing offered?  Declined    Health maintenance updated:  yes    Today's PHQ-2 Score:   PHQ-2 (  [employer]) 2019   Q1: Little interest or pleasure in doing things 0   Q2: Feeling down, depressed or hopeless 0   PHQ-2 Score 0     Today's PHQ-9 Score:   PHQ-9 SCORE 10/9/2020   PHQ-9 Total Score 5     Today's SAMIR-7 Score:   SAMIR-7 SCORE 10/9/2020   Total Score 4       Problem list and histories reviewed & adjusted, as indicated.  Additional history: as documented.    Patient Active Problem List   Diagnosis     Essential hypertension     Osteoporosis, senile     Morbid obesity (H)     Past Surgical History:   Procedure Laterality Date     BIOPSY BREAST  2001, 2014    excisional left side-B9 fibrocystic change. stereotactic bx left at 2 oclock for calcs. path-atypical ductal proliferation     COLONOSCOPY N/A 2017    Procedure: COMBINED COLONOSCOPY, SINGLE OR MULTIPLE BIOPSY/POLYPECTOMY BY BIOPSY;  Surgeon: Jerald Grant MD;  Location:  GI     LITHOTRIPSY       MAMMOPLASTY REDUCTION  2014    Dr. Trujillo and Reji. had atypical cells noted on a bx but also wanted reduction so done together.     PARATHYROIDECTOMY Right 2015    Procedure: PARATHYROIDECTOMY;  Surgeon: Lynnette Sewell MD;  Location: [facility] OR     TUBAL LIGATION  1994    post partum      Social History     Tobacco Use     Smoking status: Never Smoker     Smokeless tobacco: Never Used   Substance Use Topics     Alcohol use: Yes     Frequency: Monthly or less     Drinks per  session: 1 or 2     Binge frequency: Never      Problem (# of Occurrences) Relation (Name,Age of Onset)    Breast Cancer (1) Maternal Grandmother    Cerebrovascular Disease (2) Mother, Paternal Grandmother    Colon Cancer (1) Father (90)    Coronary Artery Disease (1) Mother    Diabetes (1) Mother    Hyperlipidemia (1) Mother    Hypertension (1) Mother    No Known Problems (4) Sister, Brother, Maternal Grandfather, Other    Other Cancer (1) Maternal Grandmother            Current Outpatient Medications   Medication Sig     calcium carb 1250 mg, 500 mg [place],/vitamin D 200 units (OSCAL WITH D) 500-200 MG-UNIT per tablet Take 2 tablets by mouth every 12 hours     IRON PO      lisdexamfetamine (VYVANSE) 40 MG capsule Take 1 capsule (40 mg) by mouth every morning     lisinopril (ZESTRIL) 20 MG tablet Take 1 tablet (20 mg) by mouth daily     No current facility-administered medications for this visit.      No Known Allergies    ROS:  12 point review of systems negative other than symptoms noted below or in the HPI.  No urinary frequency or dysuria, bladder or kidney problems      OBJECTIVE:     /60   Ht 1.524 m (5')   Wt 97.5 kg (215 lb)   BMI 41.99 kg/m    Body mass index is 41.99 kg/m .    Exam:  Constitutional:  Appearance: Well nourished, well developed alert, in no acute distress  Chest:  Respiratory Effort:  Breathing unlabored. Clear to auscultation bilaterally.   Cardiovascular: Heart: Auscultation:  Regular rate, normal rhythm, no murmurs present  Gastrointestinal:  Abdominal Examination:  Abdomen nontender to palpation, tone normal without rigidity or guarding, no masses present, umbilicus without lesions; Liver/Spleen:  No hepatomegaly present, liver nontender to palpation; Hernias:  No hernias present  Skin: General Inspection:  No rashes present, no lesions present, no areas of discoloration.  Neurologic:  Mental Status:  Oriented X3.  Normal strength and tone, sensory exam grossly normal, mentation  intact and speech normal.    Psychiatric:  Mentation appears normal and affect normal/bright.     In-Clinic Test Results:  No results found for this or any previous visit (from the past 24 hour(s)).    ASSESSMENT/PLAN:                                                        ICD-10-CM    1. Binge eating disorder  F50.81 lisdexamfetamine (VYVANSE) 40 MG capsule   2. Morbid obesity (H)  E66.01 lisdexamfetamine (VYVANSE) 40 MG capsule   3. BMI 40.0-44.9, adult (H)  Z68.41    4. Gastroesophageal reflux disease, unspecified whether esophagitis present  K21.9          BP is great and chest pain all resolved with prilosec c/w sx that sounded like GERD and not CV  Fasting labs pending and will f/u with her once they're all back    Patient is doing much better with weight loss on vyvanse at 40mg then had been with phentermine and has no side effects like insomnia, anxiety, agitation, HAs  Frustrated that weight loss wasn't faster but reassured her that 8# in one month is really good and shouldn't be any faster or wouldn't be healthy or sustainable  Continue to make good food choices, less carbs, decrease snacking and increase exercise  Continue on 40mg for one more month and then either in person or telehealth f/u at that time and can assess for increased dose at that time  Wouldn't increase at this point, despite feeling more hungry later at night by the end of her 40mg month than at the start, which reassured is also normal    Oliva Lovell MD  [facility] FOR [facility]

## 2020-11-12 ENCOUNTER — TELEPHONE (OUTPATIENT)
Dept: OBGYN | Facility: CLINIC | Age: 65
End: 2020-11-12

## 2020-11-12 LAB — DEPRECATED CALCIDIOL+CALCIFEROL SERPL-MC: 48 UG/L (ref 20–75)

## 2020-12-08 NOTE — PROGRESS NOTES
"Ana Dupree is a 65 year old female who is being evaluated via a billable telephone visit.      The patient has been notified of following:     \"This telephone visit will be conducted via a call between you and your physician/provider. We have found that certain health care needs can be provided without the need for a physical exam. This service lets us provide the care you need with a short phone conversation. If a prescription is necessary we can send it directly to your pharmacy.  If lab work is needed we can place an order for that and you can then stop by our lab to have the test done at a later time.    Telephone visits are billed at different rates depending on your insurance coverage. During this emergency period, for some insurers they may be billed the same as an in-person visit.  Please reach out to your insurance provider with any questions.    If during the course of the call the physician/provider feels a telephone visit is not appropriate, you will not be charged for this service.\"    Patient has given verbal consent for Telephone visit?  Yes    What phone number would you like to be contacted at? [phone]    How would you like to obtain your AVS? [employer]    Phone call duration: 16 minutes    Oliva Lovell MD      SUBJECTIVE:                                                   Ana Dupree is a 65 year old female who presents to clinic today for the following health issue(s):  Patient presents with:  Recheck Medication: Vyvanse follow up, pt has no complaints.    Additional information: Pt reported weight: 209lbs. And BP: 112/66.    HPI:  Has had 2 months of vyvanse at 40mg for binge eating and obesity. Lost 8# the first month and was feeling great but felt like it wasn't as fast as she'd like. Reassured that this was great progress at 2# a week especially with insulin resistance and at her age. Didn't think she needed to do an increase dose even though had asked to do that to help " 





Dragon 











HILARIO DANG MD Sep 16, 2017 18:45 lose faster    Was 215# on our scale and went home and was 217# there so actually more at home than with us. Started at 223# on our's so 225# at home.    Now on month 2 and is 209# on home scale so likely would be ever 207# with us. So net on one scale is 223 to 207# for loss and BMI is right at 40    Again wishes she'd have lost faster bc feels like eating really minimally, making good food choices, really cutting out carbs as much as possible. Able to not snack, no crave food and sugar. Going to bed early to not be tempted late at night  Was walking the dog as only exercise when gyms closed. However just bought a [employer] type bike and waiting to get shipped what she needs to set up classes but is biking now as well.    Feeling really good. No side effects. All of her heartburn is gone with prilosec but also healthier eating.  No more cereal which she was doing a lot. And nutrigrain bars are still frequent for her. Had been avoiding berries and fruits other than bananas in her protein shake worrying about high sugar in berries/fruit.  Just generally mood is better, more focused, able to get all of her work done and household things done and so happy to be on it.    Checked a BP at home and that was normal.    Had our phone number blocked and didn't realize until today. Never got the call about her labs from nov. Reviewed today that all normal other than cardiac CRP that was high at 10.    No LMP recorded. Patient is postmenopausal.    Patient is not sexually active, .  Using menopause for contraception.    reports that she has never smoked. She has never used smokeless tobacco.    Health maintenance updated:  no    Today's PHQ-2 Score:   PHQ-2 (  [employer]) 2019   Q1: Little interest or pleasure in doing things 0   Q2: Feeling down, depressed or hopeless 0   PHQ-2 Score 0     Today's PHQ-9 Score:   PHQ-9 SCORE 10/9/2020   PHQ-9 Total Score 5     Today's SAMIR-7 Score:   SAMIR-7 SCORE 10/9/2020   Total  Score 4       Problem list and histories reviewed & adjusted, as indicated.  Additional history: as documented.    Patient Active Problem List   Diagnosis     Essential hypertension     Osteoporosis, senile     Morbid obesity (H)     Past Surgical History:   Procedure Laterality Date     BIOPSY BREAST  11/2001, 07/07/2014    excisional left side-B9 fibrocystic change. stereotactic bx left at 2 oclock for calcs. path-atypical ductal proliferation     COLONOSCOPY N/A 1/19/2017    Procedure: COMBINED COLONOSCOPY, SINGLE OR MULTIPLE BIOPSY/POLYPECTOMY BY BIOPSY;  Surgeon: Jerald Grant MD;  Location:  GI     LITHOTRIPSY  2007     MAMMOPLASTY REDUCTION  11/07/2014    Dr. Trujillo and Reji. had atypical cells noted on a bx but also wanted reduction so done together.     PARATHYROIDECTOMY Right 8/21/2015    Procedure: PARATHYROIDECTOMY;  Surgeon: Lynnette Sewell MD;  Location: [facility] OR     TUBAL LIGATION  12/14/1994    post partum      Social History     Tobacco Use     Smoking status: Never Smoker     Smokeless tobacco: Never Used   Substance Use Topics     Alcohol use: Yes     Frequency: Monthly or less     Drinks per session: 1 or 2     Binge frequency: Never      Problem (# of Occurrences) Relation (Name,Age of Onset)    Breast Cancer (1) Maternal Grandmother    Cerebrovascular Disease (2) Mother, Paternal Grandmother    Colon Cancer (1) Father (90)    Coronary Artery Disease (1) Mother    Diabetes (1) Mother    Hyperlipidemia (1) Mother    Hypertension (1) Mother    No Known Problems (4) Sister, Brother, Maternal Grandfather, Other    Other Cancer (1) Maternal Grandmother            Current Outpatient Medications   Medication Sig     calcium carb 1250 mg, 500 mg [place]/vitamin D 200 units (OSCAL WITH D) 500-200 MG-UNIT per tablet Take 2 tablets by mouth every 12 hours     IRON PO      lisdexamfetamine (VYVANSE) 40 MG capsule Take 1 capsule (40 mg) by mouth every morning     [START ON 1/6/2021]  "lisdexamfetamine (VYVANSE) 40 MG capsule Take 1 capsule (40 mg) by mouth every morning     lisinopril (ZESTRIL) 20 MG tablet Take 1 tablet (20 mg) by mouth daily     No current facility-administered medications for this visit.      No Known Allergies    ROS:  12 point review of systems negative other than symptoms noted below or in the HPI.  No urinary frequency or dysuria, bladder or kidney problems      OBJECTIVE:     /66   Ht 1.524 m (5')   Wt 94.8 kg (209 lb)   BMI 40.82 kg/m    Body mass index is 40.82 kg/m .        ASSESSMENT/PLAN:                                                        ICD-10-CM    1. Binge eating disorder  F50.81 lisdexamfetamine (VYVANSE) 40 MG capsule     lisdexamfetamine (VYVANSE) 40 MG capsule   2. Morbid obesity (H)  E66.01 lisdexamfetamine (VYVANSE) 40 MG capsule     lisdexamfetamine (VYVANSE) 40 MG capsule   3. BMI 40.0-44.9, adult (H)  Z68.41        Having 2#/week loss and total of 16# since starting and feeling great  Adding more exercise back now with biking  Discussed fruit and which are higher or lower carb. Would choose berries/apples over bananas and oranges and grapes  Protein smoothie with fruit is fine but needs to be careful with granola and breakfast bars as anything packaged and \"fast\" and in the pantry is usually much worse for her than fruit of any kind    No side effects and feeling much better  Will continue on 40mg vyvanse and #30 x2 sent in for her  If weight loss is 1-2#/week then will stay on that dose and when/if slows then can consider increase    Reviewed her labs from last time and what the CRP can mean and that it is nonspecific  Will see me in person in 6 weeks for med f/u and will repeat the CRP at that time  If still elevated then may send to cards for eval. However is having improved exercise tolerance, no chest pain or GERD, no SOB other than walking and even less with biking so likely more related to being shorter and her kids being taller and " very fit so can't keep up when pacing walks with them vs when exercising alone    No other si/sx of CAD at this time with normal labs and no HTN    Oliva Lovell MD  [facility] FOR [facility]

## 2020-12-09 ENCOUNTER — VIRTUAL VISIT (OUTPATIENT)
Dept: OBGYN | Facility: CLINIC | Age: 65
End: 2020-12-09
Payer: COMMERCIAL

## 2020-12-09 VITALS
HEIGHT: 60 IN | WEIGHT: 209 LBS | SYSTOLIC BLOOD PRESSURE: 112 MMHG | BODY MASS INDEX: 41.03 KG/M2 | DIASTOLIC BLOOD PRESSURE: 66 MMHG

## 2020-12-09 DIAGNOSIS — F50.819 BINGE EATING DISORDER: Primary | ICD-10-CM

## 2020-12-09 DIAGNOSIS — E66.01 MORBID OBESITY (H): ICD-10-CM

## 2020-12-09 PROCEDURE — 99213 OFFICE O/P EST LOW 20 MIN: CPT | Mod: TEL | Performed by: OBSTETRICS & GYNECOLOGY

## 2020-12-09 RX ORDER — LISDEXAMFETAMINE DIMESYLATE 40 MG/1
40 CAPSULE ORAL EVERY MORNING
Qty: 30 CAPSULE | Refills: 0 | Status: SHIPPED | OUTPATIENT
Start: 2021-01-06 | End: 2021-03-17

## 2020-12-09 RX ORDER — LISDEXAMFETAMINE DIMESYLATE 40 MG/1
40 CAPSULE ORAL EVERY MORNING
Qty: 30 CAPSULE | Refills: 0 | Status: SHIPPED | OUTPATIENT
Start: 2020-12-09 | End: 2021-01-12

## 2020-12-09 ASSESSMENT — MIFFLIN-ST. JEOR: SCORE: 1419.52

## 2021-01-12 DIAGNOSIS — F50.819 BINGE EATING DISORDER: ICD-10-CM

## 2021-01-12 DIAGNOSIS — E66.01 MORBID OBESITY (H): ICD-10-CM

## 2021-01-12 RX ORDER — LISDEXAMFETAMINE DIMESYLATE 40 MG/1
40 CAPSULE ORAL EVERY MORNING
Qty: 30 CAPSULE | Refills: 0 | Status: SHIPPED | OUTPATIENT
Start: 2021-01-12 | End: 2021-01-20

## 2021-01-12 NOTE — TELEPHONE ENCOUNTER
Requested Prescriptions   Pending Prescriptions Disp Refills     lisdexamfetamine (VYVANSE) 40 MG capsule 30 capsule 0     Sig: Take 1 capsule (40 mg) by mouth every morning       There is no refill protocol information for this order        Next 5 appointments (look out 90 days)    Jan 20, 2021 10:15 AM  SHORT with Oliva Lovell MD  [facility] ([facility]) [address] [id number] [phone]

## 2021-01-19 NOTE — PROGRESS NOTES
SUBJECTIVE:                                                   Ana Dupree is a 65 year old female who presents to clinic today for the following health issue(s):  Patient presents with:  Recheck Medication: Vyvanse 40mg follow up     HPI:  Patient is here for f/u on her vyanse. Has lost 7# in the last 6 weeks and is 202#   Doesn't weigh herself at home. Was really hoping to be <200# as that is her biggest goal, to get under 200# and never go above it again  Got there for a few years ago when taking phentermine and then slowly gained it back and then lost motivation to do anything about it  On 40mg vyvanse. No side effects of heart palpitations, SOB, HAs, insomnia, agitation    Feels like the medicine makes her not eat impulsively, thinks about what types of foods she's choosing, not snacking as much, portions are smaller  Was already eating what she thought was small portions and healthy. However doing a lot of lean cuisine meals with high sodium, doing salads at [employer] but then doing a lot of dressing, etc  Had been exercising at the gym doing boot camps as well as at her apt fitness center and walking her dog  When gyms closed got a bit derailed and now doesn't feel safe going back to one anyway  Bought a stationary bike and the [employer] julia. Uses the julia on her regular bike as well as on the treadmill in her apt gym and loving it.  Working out at least 5-6x/week    Has a goal of getting to 160# before her daughter's wedding in august. Feels like she feels better, clothes fit better, but no one can really tell that she's lost weight  Didn't want to buy a shapeless dress for the wedding and worried that won't be able to lose fast enough for the wedding at the rate she's losing  Wanted to increase to 50mg last time but had been losing so advised her to continue on the 40mg and not waste the bump in dose unless hit a plateau.    Also, prior to starting vyvanse when had regained up to 225# was having a lot  of chest pain. Clinically seemed like GERD but did get a cardiac CRP and it was high. Wants to recheck it again today b/c worries her.  Has HTn but controlled.   fairly young of an MI so worries her    No LMP recorded. Patient is postmenopausal.     Patient is not sexually active, .  Using menopause for contraception.    reports that she has never smoked. She has never used smokeless tobacco.    Health maintenance updated:  yes    Today's PHQ-2 Score:   PHQ-2 (  [employer]) 2019   Q1: Little interest or pleasure in doing things 0   Q2: Feeling down, depressed or hopeless 0   PHQ-2 Score 0     Today's PHQ-9 Score:   PHQ-9 SCORE 10/9/2020   PHQ-9 Total Score 5     Today's SAMIR-7 Score:   SAMIR-7 SCORE 10/9/2020   Total Score 4       Problem list and histories reviewed & adjusted, as indicated.  Additional history: as documented.    Patient Active Problem List   Diagnosis     Essential hypertension     Osteoporosis, senile     Morbid obesity (H)     Past Surgical History:   Procedure Laterality Date     BIOPSY BREAST  2001, 2014    excisional left side-B9 fibrocystic change. stereotactic bx left at 2 oclock for calcs. path-atypical ductal proliferation     COLONOSCOPY N/A 2017    Procedure: COMBINED COLONOSCOPY, SINGLE OR MULTIPLE BIOPSY/POLYPECTOMY BY BIOPSY;  Surgeon: Jerald Grant MD;  Location:  GI     LITHOTRIPSY  2007     MAMMOPLASTY REDUCTION  2014    Dr. Trujillo and Reji. had atypical cells noted on a bx but also wanted reduction so done together.     PARATHYROIDECTOMY Right 2015    Procedure: PARATHYROIDECTOMY;  Surgeon: Lynnette Sewell MD;  Location: [facility] OR     TUBAL LIGATION  1994    post partum      Social History     Tobacco Use     Smoking status: Never Smoker     Smokeless tobacco: Never Used   Substance Use Topics     Alcohol use: Yes     Frequency: Monthly or less     Drinks per session: 1 or 2     Binge frequency: Never      Problem (#  of Occurrences) Relation (Name,Age of Onset)    Breast Cancer (1) Maternal Grandmother    Cerebrovascular Disease (2) Mother, Paternal Grandmother    Colon Cancer (1) Father (90)    Coronary Artery Disease (1) Mother    Diabetes (1) Mother    Hyperlipidemia (1) Mother    Hypertension (1) Mother    No Known Problems (4) Sister, Brother, Maternal Grandfather, Other    Other Cancer (1) Maternal Grandmother            Current Outpatient Medications   Medication Sig     calcium carb 1250 mg, 500 mg [place],/vitamin D 200 units (OSCAL WITH D) 500-200 MG-UNIT per tablet Take 2 tablets by mouth every 12 hours     IRON PO      lisdexamfetamine (VYVANSE) 40 MG capsule Take 1 capsule (40 mg) by mouth every morning     lisdexamfetamine (VYVANSE) 50 MG capsule Take 1 capsule (50 mg) by mouth every morning     lisdexamfetamine (VYVANSE) 50 MG capsule Take 1 capsule (50 mg) by mouth every morning     lisinopril (ZESTRIL) 20 MG tablet Take 1 tablet (20 mg) by mouth daily     No current facility-administered medications for this visit.      No Known Allergies    ROS:  12 point review of systems negative other than symptoms noted below or in the HPI.  No urinary frequency or dysuria, bladder or kidney problems      OBJECTIVE:     /65   Ht 1.524 m (5')   Wt 91.6 kg (202 lb)   BMI 39.45 kg/m    Body mass index is 39.45 kg/m .    Exam:  Constitutional:  Appearance: Well nourished, well developed alert, in no acute distress  Neck:  Lymph Nodes:  No lymphadenopathy present; Thyroid:  Gland size normal, nontender, no nodules or masses present on palpation  Chest:  Respiratory Effort:  Breathing unlabored. Clear to auscultation bilaterally.   Cardiovascular: Heart: Auscultation:  Regular rate, normal rhythm, no murmurs present  Neurologic:  Mental Status:  Oriented X3.  Normal strength and tone, sensory exam grossly normal, mentation intact and speech normal.    Psychiatric:  Mentation appears normal and affect normal/bright.      In-Clinic Test Results:  No results found for this or any previous visit (from the past 24 hour(s)).    ASSESSMENT/PLAN:                                                        ICD-10-CM    1. Binge eating disorder  F50.81 lisdexamfetamine (VYVANSE) 50 MG capsule     lisdexamfetamine (VYVANSE) 50 MG capsule   2. High risk medication use  Z79.899    3. Morbid obesity (H)  E66.01 CARDIOLOGY EVAL ADULT REFERRAL   4. Elevated C-reactive protein (CRP)  R79.82 CRP cardiac risk     CARDIOLOGY EVAL ADULT REFERRAL   5. Essential hypertension  I10 CARDIOLOGY EVAL ADULT REFERRAL         Patient is continuing to lose weight though feels the med effects wear off a little earlier in the day and is having to work harder to eat smaller portions and not to snack later at night  Discussed pros/cons of increasing dose to 50mg. May not notice any faster weight loss but could be slightly more effective or effective slightly later into the day  Has no side effects from it so will finish out her current 40mg as just got a refill on it a week ago, and then in 3 weeks will convert to 50mg  Reassured that losing 1# per week is completely normal and healthy and to set a goal of 160# at her age, may not be realistic and then will feel disappointed vs proud of accomplishing more manageable goals    Encouraged her to not think of one certain number but rather to exercise 5-7x/week as she is, make smart food choices that are not high sodium or carbs/sweets, not eat late at night, general be more active and less sedentary and continue to work on what she has been to have a life long solution rather than just a crash diet for a wedding and patient agrees with this    Repeat cardiac CRP done today  Informed patient that this is more specific for cardiac issues but is still a very nonspecific test  With continued weight loss and good BP control, avoiding high lipids and DM, this should all help with CV risk  The pain she had is completely gone and  realizes it was all GERD once she started to lose weight and avoid red sauce and other trigger foods  However if it is elevated, we can have her do a one time cardiac eval to ensure she doesn't need stress testing, etc and to eval any other reasons for a high CRP    Spent 30 min with the patient including review of prior labs and previous cardia work up, family history, etc  Discussed limitations and sensitivity of lab testing and interventions in the future in addition to review of healthy eating and exercise plan, attainable small goals for her health and weight loss and review of medication safety, tolerance, pros/cons      Oliva Lovell MD  [facility] [place]

## 2021-01-20 ENCOUNTER — TELEPHONE (OUTPATIENT)
Dept: LAB | Facility: CLINIC | Age: 66
End: 2021-01-20

## 2021-01-20 ENCOUNTER — OFFICE VISIT (OUTPATIENT)
Dept: OBGYN | Facility: CLINIC | Age: 66
End: 2021-01-20
Payer: COMMERCIAL

## 2021-01-20 VITALS
WEIGHT: 202 LBS | DIASTOLIC BLOOD PRESSURE: 64 MMHG | SYSTOLIC BLOOD PRESSURE: 110 MMHG | BODY MASS INDEX: 39.66 KG/M2 | HEIGHT: 60 IN

## 2021-01-20 DIAGNOSIS — R79.82 ELEVATED C-REACTIVE PROTEIN (CRP): ICD-10-CM

## 2021-01-20 DIAGNOSIS — E66.01 MORBID OBESITY (H): ICD-10-CM

## 2021-01-20 DIAGNOSIS — F50.819 BINGE EATING DISORDER: Primary | ICD-10-CM

## 2021-01-20 DIAGNOSIS — I10 ESSENTIAL HYPERTENSION: ICD-10-CM

## 2021-01-20 DIAGNOSIS — Z79.899 HIGH RISK MEDICATION USE: ICD-10-CM

## 2021-01-20 LAB — CRP SERPL HS-MCNC: 11 MG/L

## 2021-01-20 PROCEDURE — 99214 OFFICE O/P EST MOD 30 MIN: CPT | Performed by: OBSTETRICS & GYNECOLOGY

## 2021-01-20 PROCEDURE — 36415 COLL VENOUS BLD VENIPUNCTURE: CPT | Performed by: OBSTETRICS & GYNECOLOGY

## 2021-01-20 PROCEDURE — 86141 C-REACTIVE PROTEIN HS: CPT | Performed by: OBSTETRICS & GYNECOLOGY

## 2021-01-20 ASSESSMENT — MIFFLIN-ST. JEOR: SCORE: 1387.77

## 2021-01-20 NOTE — LETTER
[facility]  [address] [place] [address] [address]  [facility] [address]  [phone]        January 20, 2021    Ana Dupree  [address]   [facility] [address]              Dear Ana Dupree    This is to remind you that your CRP Cardiac Risk is due.    You may call our office at [phone] to schedule an appointment.    Please disregard this notice if you have already had your labs drawn or made an appointment.        Sincerely,        Oliva Lovell MD

## 2021-01-20 NOTE — TELEPHONE ENCOUNTER
1st Attempt: Mailed letter to patient reminding of overdue CRP Cardiac Risk lab. Extending lab out 3 months. SM

## 2021-01-21 RX ORDER — LISDEXAMFETAMINE DIMESYLATE 50 MG/1
50 CAPSULE ORAL EVERY MORNING
Qty: 30 CAPSULE | Refills: 0 | Status: SHIPPED | OUTPATIENT
Start: 2021-01-21 | End: 2021-05-10

## 2021-02-05 ENCOUNTER — OFFICE VISIT (OUTPATIENT)
Dept: CARDIOLOGY | Facility: CLINIC | Age: 66
End: 2021-02-05
Attending: OBSTETRICS & GYNECOLOGY
Payer: COMMERCIAL

## 2021-02-05 VITALS
HEART RATE: 53 BPM | SYSTOLIC BLOOD PRESSURE: 126 MMHG | DIASTOLIC BLOOD PRESSURE: 74 MMHG | BODY MASS INDEX: 39.65 KG/M2 | OXYGEN SATURATION: 96 % | WEIGHT: 203 LBS

## 2021-02-05 DIAGNOSIS — E66.01 MORBID OBESITY (H): ICD-10-CM

## 2021-02-05 DIAGNOSIS — R79.82 ELEVATED C-REACTIVE PROTEIN (CRP): ICD-10-CM

## 2021-02-05 DIAGNOSIS — I10 ESSENTIAL HYPERTENSION: ICD-10-CM

## 2021-02-05 PROCEDURE — 93000 ELECTROCARDIOGRAM COMPLETE: CPT | Performed by: INTERNAL MEDICINE

## 2021-02-05 PROCEDURE — 99203 OFFICE O/P NEW LOW 30 MIN: CPT | Mod: 25 | Performed by: INTERNAL MEDICINE

## 2021-02-05 RX ORDER — CHOLECALCIFEROL (VITAMIN D3) 50 MCG
1 TABLET ORAL DAILY
COMMUNITY

## 2021-02-05 NOTE — LETTER
2/5/2021    Oliva Lovell MD  [address]  [facility] [address]    RE: Ana S Leia       Dear Colleague,    I had the pleasure of seeing Ana Wesleymanuel in the [facility].      HPI: This is a 65 year old with history of obesity here for new patient evaluation for cardiac risk assessment. She has a history of HTN and HLD, with recent CRP elevation. She occasionally has palpitations. She is active at [employer], does work outs on [employer], also works out in the mornings in aerobic activity . She gets heart burn sensations when she lays down, not associated with exercise. She does get winded with walking up hills, and feels she cannot carry a conversation with walking up hills. She is a non smoker.     Family history: mother had CAD and stroke in her 60s. Father had colon cancer. Kids are healthy.    CRP 11.0 mg/L. LDL 88 mg/dL. HDL is 48 mg/dL.  mg/dL. She is on lisinopril. Blood pressure is well controlled.     ASSESSMENT/PLAN:    In summary this is a 65 year old female with elevated CRP. We discussed in detail systemic inflammation and possible causes, ways to mitigate this. I have recommended dietary changes and considering functional nutritionist to get sensitivity food testing. She has great blood pressure and cholesterol control so there are dietary ways to reduce inflammation and contribute to more weight loss. At her age with family history she is intermediate pre test probability for having coronary disease. Given her short windedness with heavy exertion would like to evaluate further.     Plan:    1. Calcium score CT  2. Echocardiogram  3. Continue lisinopril for BP control   4. Dietary guidance for TGs, functional nutritionist recommended (also encourage Mediterranean type diet, non processed foods)    Follow up 1 year, sooner if needed if above studies abnormal.    Nata Vázquez MD MSc  [facility]  Care        PAST MEDICAL HISTORY  Past Medical History:   Diagnosis Date     Essential hypertension 11/25/2016     Hyperlipidemia     elevated triglycerides     Hyperparathyroidism (H) 06/2014    referred to endo when PTH/calcium still high 11/14 and 12/14. seeing Dr. leos. dexa wtih penia at hips and porosis wrist. disc. surgery 12/14     Hypertension      Morbid obesity (H)      Osteoporosis     osteopenia at hips and osteoporosis at wrist according to dexa done through endo referral     Osteoporosis, senile 11/25/2016     Renal stones      Situational depression      Sleep apnea 2009    Doesn't use a CPAP       CURRENT MEDICATIONS  Current Outpatient Medications   Medication Sig Dispense Refill     calcium carb 1250 mg, 500 mg [employer] [place],/vitamin D 200 units (OSCAL WITH D) 500-200 MG-UNIT per tablet Take 2 tablets by mouth every 12 hours 100 tablet 0     IRON PO        lisdexamfetamine (VYVANSE) 40 MG capsule Take 1 capsule (40 mg) by mouth every morning 30 capsule 0     lisdexamfetamine (VYVANSE) 50 MG capsule Take 1 capsule (50 mg) by mouth every morning 30 capsule 0     lisdexamfetamine (VYVANSE) 50 MG capsule Take 1 capsule (50 mg) by mouth every morning 30 capsule 0     lisinopril (ZESTRIL) 20 MG tablet Take 1 tablet (20 mg) by mouth daily 30 tablet 0     UNABLE TO FIND MEDICATION NAME: Keratin       UNABLE TO FIND MEDICATION NAME: Rhodiola       UNABLE TO FIND MEDICATION NAME: Collagen       vitamin D3 (CHOLECALCIFEROL) 50 mcg (2000 units) tablet Take 1 tablet by mouth daily         PAST SURGICAL HISTORY:  Past Surgical History:   Procedure Laterality Date     BIOPSY BREAST  11/2001, 07/07/2014    excisional left side-B9 fibrocystic change. stereotactic bx left at 2 oclock for calcs. path-atypical ductal proliferation     COLONOSCOPY N/A 1/19/2017    Procedure: COMBINED COLONOSCOPY, SINGLE OR MULTIPLE BIOPSY/POLYPECTOMY BY BIOPSY;  Surgeon: Jerald Grant MD;  Location:  GI     LITHOTRIPSY  2007      MAMMOPLASTY REDUCTION  11/07/2014    Dr. Trujillo and Reji. had atypical cells noted on a bx but also wanted reduction so done together.     PARATHYROIDECTOMY Right 8/21/2015    Procedure: PARATHYROIDECTOMY;  Surgeon: Lynnette Sewell MD;  Location: [facility] OR     TUBAL LIGATION  12/14/1994    post partum       ALLERGIES   No Known Allergies    FAMILY HISTORY  Family History   Problem Relation Age of Onset     Hypertension Mother      Cerebrovascular Disease Mother      Diabetes Mother      Hyperlipidemia Mother      Coronary Artery Disease Mother      Colon Cancer Father 90     Other Cancer Maternal Grandmother      Breast Cancer Maternal Grandmother      Cerebrovascular Disease Paternal Grandmother      No Known Problems Sister      No Known Problems Brother      No Known Problems Maternal Grandfather      No Known Problems Other          SOCIAL HISTORY  Social History     Socioeconomic History     Marital status:      Spouse name: Jim (age 47 [place], 2003)     Number of children: 3     Years of education: Not on file     Highest education level: Not on file   Occupational History     Occupation: Paraprofessional (substituting)   Social Needs     Financial resource strain: Not on file     Food insecurity     Worry: Not on file     Inability: Not on file     Transportation needs     Medical: Not on file     Non-medical: Not on file   Tobacco Use     Smoking status: Never Smoker     Smokeless tobacco: Never Used   Substance and Sexual Activity     Alcohol use: Yes     Frequency: Monthly or less     Drinks per session: 1 or 2     Binge frequency: Never     Drug use: No     Sexual activity: Not Currently     Birth control/protection: Surgical, Post-menopausal     Comment: Tubal Ligation 1994   Lifestyle     Physical activity     Days per week: Not on file     Minutes per session: Not on file     Stress: Not on file   Relationships     Social connections     Talks on phone: Not on file     Gets together: Not on file      Attends Buddhist service: Not on file     Active member of club or organization: Not on file     Attends meetings of clubs or organizations: Not on file     Relationship status: Not on file     Intimate partner violence     Fear of current or ex partner: Not on file     Emotionally abused: Not on file     Physically abused: Not on file     Forced sexual activity: Not on file   Other Topics Concern     Parent/sibling w/ CABG, MI or angioplasty before 65F 55M? Not Asked   Social History Narrative     Not on file       ROS:   Constitutional: No fever, chills, or sweats. No weight gain/loss   ENT: No visual disturbance, ear ache, epistaxis, sore throat  Allergies/Immunologic: Negative  Respiratory: No cough, hemoptysia  Cardiovascular: As per HPI  GI: No nausea, vomiting, hematemesis, melena, or hematochezia  : No urinary frequency, dysuria, or hematuria  Integument: Negative  Psychiatric: Negative  Neuro: Negative  Endocrinology: Negative   Musculoskeletal: Negative  Vascular: No walking impairment, claudication, ischemic rest pain or nonhealing wounds    EXAM:  /74   Pulse 53   Wt 92.1 kg (203 lb)   SpO2 96%   BMI 39.65 kg/m    In general, the patient is a pleasant female in no apparent distress.    HEENT: NC/AT.  PERRLA.  EOMI.  Sclerae white, not injected.  Nares clear.  Pharynx without erythema or exudate.  Dentition intact.    Neck: No adenopathy.  No thyromegaly. Carotids +2/2 bilaterally without bruits.  No jugular venous distension.   Heart: RRR. Normal S1, S2 splits physiologically. No murmur, rub, click, or gallop. The PMI is in the 5th ICS in the midclavicular line. There is no heave.    Lungs: CTA.  No ronchi, wheezes, rales.  No dullness to percussion.   Abdomen: Soft, nontender, nondistended. No organomegaly. No AAA.  No bruits.   Extremities: No clubbing, cyanosis, or edema.  No wounds. No varicose veins signs of chronic venous insufficiency.   Vascular: No bruits are  noted.    Labs:  LIPID RESULTS:  Lab Results   Component Value Date    CHOL 168 11/11/2020    HDL 48 (L) 11/11/2020    LDL 88 11/11/2020    TRIG 160 (H) 11/11/2020    NHDL 120 11/11/2020       LIVER ENZYME RESULTS:  Lab Results   Component Value Date    AST 17 11/11/2020    ALT 27 11/11/2020       CBC RESULTS:  Lab Results   Component Value Date    WBC 6.4 11/11/2020    RBC 4.16 11/11/2020    HGB 11.9 11/11/2020    HCT 36.9 11/11/2020    MCV 89 11/11/2020    MCH 28.6 11/11/2020    MCHC 32.2 11/11/2020    RDW 13.5 11/11/2020     11/11/2020       BMP RESULTS:  Lab Results   Component Value Date     11/11/2020    POTASSIUM 4.0 11/11/2020    CHLORIDE 107 11/11/2020    CO2 25 11/11/2020    ANIONGAP 5 11/11/2020    GLC 95 11/11/2020    BUN 13 11/11/2020    CR 0.66 11/11/2020    GFRESTIMATED >90 11/11/2020    GFRESTBLACK >90 11/11/2020    CRYS 8.8 11/11/2020        A1C RESULTS:  Lab Results   Component Value Date    A1C 5.4 11/11/2020       Thank you for allowing me to participate in the care of your patient.    Sincerely,     Nata Vázquez MD     [facility]

## 2021-02-05 NOTE — PROGRESS NOTES
HPI: This is a 65 year old with history of obesity here for new patient evaluation for cardiac risk assessment. She has a history of HTN and HLD, with recent CRP elevation. She occasionally has palpitations. She is active at [employer], does work outs on [place], also works out in the mornings in aerobic activity . She gets heart burn sensations when she lays down, not associated with exercise. She does get winded with walking up hills, and feels she cannot carry a conversation with walking up hills. She is a non smoker.     Family history: mother had CAD and stroke in her 60s. Father had colon cancer. Kids are healthy.    CRP 11.0 mg/L. LDL 88 mg/dL. HDL is 48 mg/dL.  mg/dL. She is on lisinopril. Blood pressure is well controlled.     ASSESSMENT/PLAN:    In summary this is a 65 year old female with elevated CRP. We discussed in detail systemic inflammation and possible causes, ways to mitigate this. I have recommended dietary changes and considering functional nutritionist to get sensitivity food testing. She has great blood pressure and cholesterol control so there are dietary ways to reduce inflammation and contribute to more weight loss. At her age with family history she is intermediate pre test probability for having coronary disease. Given her short windedness with heavy exertion would like to evaluate further.     Plan:    1. Calcium score CT  2. Echocardiogram  3. Continue lisinopril for BP control   4. Dietary guidance for TGs, functional nutritionist recommended (also encourage Mediterranean type diet, non processed foods)    Follow up 1 year, sooner if needed if above studies abnormal.    Nata Vázquez MD MSc  [facility]        PAST MEDICAL HISTORY  Past Medical History:   Diagnosis Date     Essential hypertension 11/25/2016     Hyperlipidemia     elevated triglycerides     Hyperparathyroidism (H) 06/2014    referred to endo when PTH/calcium still high 11/14 and 12/14.  seeing Dr. leos. dexa wtih penia at hips and porosis wrist. disc. surgery 12/14     Hypertension      Morbid obesity (H)      Osteoporosis     osteopenia at hips and osteoporosis at wrist according to dexa done through endo referral     Osteoporosis, senile 11/25/2016     Renal stones      Situational depression      Sleep apnea 2009    Doesn't use a CPAP       CURRENT MEDICATIONS  Current Outpatient Medications   Medication Sig Dispense Refill     calcium carb 1250 mg, 500 mg [place],/vitamin D 200 units (OSCAL WITH D) 500-200 MG-UNIT per tablet Take 2 tablets by mouth every 12 hours 100 tablet 0     IRON PO        lisdexamfetamine (VYVANSE) 40 MG capsule Take 1 capsule (40 mg) by mouth every morning 30 capsule 0     lisdexamfetamine (VYVANSE) 50 MG capsule Take 1 capsule (50 mg) by mouth every morning 30 capsule 0     lisdexamfetamine (VYVANSE) 50 MG capsule Take 1 capsule (50 mg) by mouth every morning 30 capsule 0     lisinopril (ZESTRIL) 20 MG tablet Take 1 tablet (20 mg) by mouth daily 30 tablet 0     UNABLE TO FIND MEDICATION NAME: Keratin       UNABLE TO FIND MEDICATION NAME: [place]       UNABLE TO FIND MEDICATION NAME: Collagen       vitamin D3 (CHOLECALCIFEROL) 50 mcg (2000 units) tablet Take 1 tablet by mouth daily         PAST SURGICAL HISTORY:  Past Surgical History:   Procedure Laterality Date     BIOPSY BREAST  11/2001, 07/07/2014    excisional left side-B9 fibrocystic change. stereotactic bx left at 2 oclock for calcs. path-atypical ductal proliferation     COLONOSCOPY N/A 1/19/2017    Procedure: COMBINED COLONOSCOPY, SINGLE OR MULTIPLE BIOPSY/POLYPECTOMY BY BIOPSY;  Surgeon: Jerald Grant MD;  Location:  GI     LITHOTRIPSY  2007     MAMMOPLASTY REDUCTION  11/07/2014    Dr. Trujillo and Reji. had atypical cells noted on a bx but also wanted reduction so done together.     PARATHYROIDECTOMY Right 8/21/2015    Procedure: PARATHYROIDECTOMY;  Surgeon: Lynnette Sewell MD;  Location:  OR      TUBAL LIGATION  12/14/1994    post partum       ALLERGIES   No Known Allergies    FAMILY HISTORY  Family History   Problem Relation Age of Onset     Hypertension Mother      Cerebrovascular Disease Mother      Diabetes Mother      Hyperlipidemia Mother      Coronary Artery Disease Mother      Colon Cancer Father 90     Other Cancer Maternal Grandmother      Breast Cancer Maternal Grandmother      Cerebrovascular Disease Paternal Grandmother      No Known Problems Sister      No Known Problems Brother      No Known Problems Maternal Grandfather      No Known Problems Other          SOCIAL HISTORY  Social History     Socioeconomic History     Marital status:      Spouse name: Jim (age 47 MI, 2003)     Number of children: 3     Years of education: Not on file     Highest education level: Not on file   Occupational History     Occupation: Paraprofessional (substituting)   Social Needs     Financial resource strain: Not on file     Food insecurity     Worry: Not on file     Inability: Not on file     Transportation needs     Medical: Not on file     Non-medical: Not on file   Tobacco Use     Smoking status: Never Smoker     Smokeless tobacco: Never Used   Substance and Sexual Activity     Alcohol use: Yes     Frequency: Monthly or less     Drinks per session: 1 or 2     Binge frequency: Never     Drug use: No     Sexual activity: Not Currently     Birth control/protection: Surgical, Post-menopausal     Comment: Tubal Ligation 1994   Lifestyle     Physical activity     Days per week: Not on file     Minutes per session: Not on file     Stress: Not on file   Relationships     Social connections     Talks on phone: Not on file     Gets together: Not on file     Attends Evangelical service: Not on file     Active member of club or organization: Not on file     Attends meetings of clubs or organizations: Not on file     Relationship status: Not on file     Intimate partner violence     Fear of current or ex partner: Not  on file     Emotionally abused: Not on file     Physically abused: Not on file     Forced sexual activity: Not on file   Other Topics Concern     Parent/sibling w/ CABG, MI or angioplasty before 65F 55M? Not Asked   Social History Narrative     Not on file       ROS:   Constitutional: No fever, chills, or sweats. No weight gain/loss   ENT: No visual disturbance, ear ache, epistaxis, sore throat  Allergies/Immunologic: Negative  Respiratory: No cough, hemoptysia  Cardiovascular: As per HPI  GI: No nausea, vomiting, hematemesis, melena, or hematochezia  : No urinary frequency, dysuria, or hematuria  Integument: Negative  Psychiatric: Negative  Neuro: Negative  Endocrinology: Negative   Musculoskeletal: Negative  Vascular: No walking impairment, claudication, ischemic rest pain or nonhealing wounds    EXAM:  /74   Pulse 53   Wt 92.1 kg (203 lb)   SpO2 96%   BMI 39.65 kg/m    In general, the patient is a pleasant female in no apparent distress.    HEENT: NC/AT.  PERRLA.  EOMI.  Sclerae white, not injected.  Nares clear.  Pharynx without erythema or exudate.  Dentition intact.    Neck: No adenopathy.  No thyromegaly. Carotids +2/2 bilaterally without bruits.  No jugular venous distension.   Heart: RRR. Normal S1, S2 splits physiologically. No murmur, rub, click, or gallop. The PMI is in the 5th ICS in the midclavicular line. There is no heave.    Lungs: CTA.  No ronchi, wheezes, rales.  No dullness to percussion.   Abdomen: Soft, nontender, nondistended. No organomegaly. No AAA.  No bruits.   Extremities: No clubbing, cyanosis, or edema.  No wounds. No varicose veins signs of chronic venous insufficiency.   Vascular: No bruits are noted.    Labs:  LIPID RESULTS:  Lab Results   Component Value Date    CHOL 168 11/11/2020    HDL 48 (L) 11/11/2020    LDL 88 11/11/2020    TRIG 160 (H) 11/11/2020    NHDL 120 11/11/2020       LIVER ENZYME RESULTS:  Lab Results   Component Value Date    AST 17 11/11/2020    ALT 27  11/11/2020       CBC RESULTS:  Lab Results   Component Value Date    WBC 6.4 11/11/2020    RBC 4.16 11/11/2020    HGB 11.9 11/11/2020    HCT 36.9 11/11/2020    MCV 89 11/11/2020    MCH 28.6 11/11/2020    MCHC 32.2 11/11/2020    RDW 13.5 11/11/2020     11/11/2020       BMP RESULTS:  Lab Results   Component Value Date     11/11/2020    POTASSIUM 4.0 11/11/2020    CHLORIDE 107 11/11/2020    CO2 25 11/11/2020    ANIONGAP 5 11/11/2020    GLC 95 11/11/2020    BUN 13 11/11/2020    CR 0.66 11/11/2020    GFRESTIMATED >90 11/11/2020    GFRESTBLACK >90 11/11/2020    CRYS 8.8 11/11/2020        A1C RESULTS:  Lab Results   Component Value Date    A1C 5.4 11/11/2020

## 2021-02-05 NOTE — PATIENT INSTRUCTIONS
"1. Recommend seeing a \"functional nutritionist\" who can test your blood for food reactivity and inflammation   2. Calcium CT to look for coronary blockages   3. Echocardiogram   4. Follow up in 12 months   " Assessment/Plan:     Diagnoses and all orders for this visit:    1. Status post total left knee replacement  - Stable, doing well, continue to keep appointments. Due here in April for follow up        Follow-up Disposition:  Return in about 4 weeks (around 4/1/2019) for Follow Up. Discussed expected course/resolution/complications of diagnosis in detail with patient.    Medication risks/benefits/costs/interactions/alternatives discussed with patient.    Pt was given after visit summary which includes diagnoses, current medications & vitals. Pt expressed understanding with the diagnosis and plan        Subjective:      Lebron Stallings is a 69 y.o. female who presents for had concerns including Follow-up (Left knee replacement ). Here today for follow up on TKR left knee on 2/27/19. Using a walker. Following up with Dr. Yajaira Elam on 3/13/19. States doing well.  at home helping take care of her. No fever. Pain under control. Has started PT 3 times a week outpatient. Denies falls. Taking Xarelto post surgical.  BP at goal today. Incision is covered with a bandage that is due to be removed tomorrow and patient may shower per orders. Current Outpatient Medications   Medication Sig Dispense Refill    HYDROcodone-acetaminophen (NORCO) 5-325 mg per tablet take 1-2 tablets by mouth every 4 hours if needed for pain  0    ondansetron (ZOFRAN ODT) 4 mg disintegrating tablet dissolve 1 tablet ON TONGUE every 4 hours if needed for nausea and vomiting  0    XARELTO 10 mg tablet take tablet by mouth once daily for ANTICOAGULATION  0    rosuvastatin (CRESTOR) 5 mg tablet TAKE 1 TABLET EVERY DAY      calcium carbonate-vitamin d2 500 mg(1,250mg) -200 unit tab Take  by mouth.  multivitamin, tx-iron-ca-min (THERA-M W/ IRON) 9 mg iron-400 mcg tab tablet Take 1 Tab by mouth daily.       enalapril (VASOTEC) 2.5 mg tablet TAKE 1 TABLET EVERY DAY 90 Tab 1    rosuvastatin (CRESTOR) 5 mg tablet TAKE 1 TABLET EVERY DAY 90 Tab 1    aspirin delayed-release 81 mg tablet Take  by mouth daily. Allergies   Allergen Reactions    Penicillins Unknown (comments) and Swelling     None noted        ROS:   Review of Systems   Constitutional: Negative for fever and malaise/fatigue. Respiratory: Negative for cough and sputum production. Cardiovascular: Negative for chest pain, palpitations and leg swelling. Objective:     Visit Vitals  /82 (BP 1 Location: Right arm, BP Patient Position: Sitting)   Pulse (!) 102   Temp 98.4 °F (36.9 °C)   Resp 18   Ht 5' 7\" (1.702 m)   Wt 198 lb (89.8 kg)   SpO2 97%   BMI 31.01 kg/m²       Vitals and Nurse Documentation reviewed. Physical Exam   Constitutional: She is well-developed, well-nourished, and in no distress. No distress. HENT:   Head: Normocephalic and atraumatic. Cardiovascular: Normal rate, regular rhythm and normal heart sounds. Exam reveals no gallop and no friction rub. No murmur heard. Pulmonary/Chest: Effort normal and breath sounds normal. No respiratory distress. She has no wheezes. She has no rales. Neurological: She is alert. Skin: She is not diaphoretic. Psychiatric: Affect normal.       Results for orders placed or performed in visit on 10/22/18   CBC WITH AUTOMATED DIFF   Result Value Ref Range    WBC 5.0 3.4 - 10.8 x10E3/uL    RBC 5.78 (H) 3.77 - 5.28 x10E6/uL    HGB 14.9 11.1 - 15.9 g/dL    HCT 45.9 34.0 - 46.6 %    MCV 79 79 - 97 fL    MCH 25.8 (L) 26.6 - 33.0 pg    MCHC 32.5 31.5 - 35.7 g/dL    RDW 15.8 (H) 12.3 - 15.4 %    PLATELET 296 960 - 704 x10E3/uL    NEUTROPHILS 36 Not Estab. %    Lymphocytes 52 Not Estab. %    MONOCYTES 7 Not Estab. %    EOSINOPHILS 4 Not Estab. %    BASOPHILS 1 Not Estab. %    ABS. NEUTROPHILS 1.8 1.4 - 7.0 x10E3/uL    Abs Lymphocytes 2.6 0.7 - 3.1 x10E3/uL    ABS. MONOCYTES 0.4 0.1 - 0.9 x10E3/uL    ABS. EOSINOPHILS 0.2 0.0 - 0.4 x10E3/uL    ABS.  BASOPHILS 0.0 0.0 - 0.2 x10E3/uL    IMMATURE GRANULOCYTES 0 Not Estab. %    ABS. IMM. GRANS. 0.0 0.0 - 0.1 J63T8/HC   METABOLIC PANEL, BASIC   Result Value Ref Range    Glucose 82 65 - 99 mg/dL    BUN 13 8 - 27 mg/dL    Creatinine 0.68 0.57 - 1.00 mg/dL    GFR est non-AA 85 >59 mL/min/1.73    GFR est AA 98 >59 mL/min/1.73    BUN/Creatinine ratio 19 12 - 28    Sodium 145 (H) 134 - 144 mmol/L    Potassium 4.5 3.5 - 5.2 mmol/L    Chloride 101 96 - 106 mmol/L    CO2 24 20 - 29 mmol/L    Calcium 9.8 8.7 - 10.3 mg/dL   LIPID PANEL   Result Value Ref Range    Cholesterol, total 179 100 - 199 mg/dL    Triglyceride 75 0 - 149 mg/dL    HDL Cholesterol 74 >39 mg/dL    VLDL, calculated 15 5 - 40 mg/dL    LDL, calculated 90 0 - 99 mg/dL   HEPATIC FUNCTION PANEL   Result Value Ref Range    Protein, total 7.6 6.0 - 8.5 g/dL    Albumin 4.6 3.5 - 4.8 g/dL    Bilirubin, total 0.5 0.0 - 1.2 mg/dL    Bilirubin, direct 0.16 0.00 - 0.40 mg/dL    Alk.  phosphatase 78 39 - 117 IU/L    AST (SGOT) 33 0 - 40 IU/L    ALT (SGPT) 26 0 - 32 IU/L   MICROALBUMIN, UR, RAND   Result Value Ref Range    Creatinine, urine 148.1 Not Estab. mg/dL    Microalbumin, urine 5.8 Not Estab. ug/mL    Microalb/Creat ratio (ug/mg creat.) 3.9 0.0 - 30.0 mg/g creat   HEMOGLOBIN A1C WITH EAG   Result Value Ref Range    Hemoglobin A1c 6.4 (H) 4.8 - 5.6 %    Estimated average glucose 137 mg/dL   TSH 3RD GENERATION   Result Value Ref Range    TSH 2.400 0.450 - 4.500 uIU/mL

## 2021-02-19 ENCOUNTER — HOSPITAL ENCOUNTER (OUTPATIENT)
Dept: CARDIOLOGY | Facility: CLINIC | Age: 66
End: 2021-02-19
Attending: INTERNAL MEDICINE
Payer: COMMERCIAL

## 2021-02-19 DIAGNOSIS — I10 ESSENTIAL HYPERTENSION: ICD-10-CM

## 2021-02-19 DIAGNOSIS — R79.82 ELEVATED C-REACTIVE PROTEIN (CRP): ICD-10-CM

## 2021-02-19 PROCEDURE — 93306 TTE W/DOPPLER COMPLETE: CPT | Mod: 26 | Performed by: INTERNAL MEDICINE

## 2021-02-19 PROCEDURE — 75571 CT HRT W/O DYE W/CA TEST: CPT

## 2021-02-19 PROCEDURE — 75571 CT HRT W/O DYE W/CA TEST: CPT | Mod: 26 | Performed by: INTERNAL MEDICINE

## 2021-02-19 PROCEDURE — 93306 TTE W/DOPPLER COMPLETE: CPT

## 2021-02-22 ENCOUNTER — TELEPHONE (OUTPATIENT)
Dept: CARDIOLOGY | Facility: CLINIC | Age: 66
End: 2021-02-22

## 2021-02-22 DIAGNOSIS — I77.810 ASCENDING AORTA DILATATION (H): Primary | ICD-10-CM

## 2021-02-22 NOTE — TELEPHONE ENCOUNTER
Echocardiogram results noted 2/19/21:    Interpretation Summary     1. The left ventricle is normal in structure, function and size. The visual  ejection fraction is estimated at 60%.  2. The right ventricle is normal in structure, function and size.  3. There is mild (1+) aortic regurgitation.  4. The ascending aorta is Mildly dilated. 3.7cm. Root normal at 3.5cm.     No previous echo for comparison.    Will route to Dr. Vázquez for review.

## 2021-02-25 NOTE — TELEPHONE ENCOUNTER
Patient returned call and was wondering if Dr. Vázquez had chance to look at her ECHO yet. RN advised patient that this Rn will send to Dr. Vázquez for further review and recommendation.

## 2021-03-04 ENCOUNTER — TELEPHONE (OUTPATIENT)
Dept: OBGYN | Facility: CLINIC | Age: 66
End: 2021-03-04

## 2021-03-04 NOTE — TELEPHONE ENCOUNTER
RN will update patient via [employer].     Dr. Vázquez's recommendations.    Overall echocardiogram looks good, the function is normal and would not explain her shortness of breath from what I can see. Valve function normal. The aorta being 3.7 is really upper limits of normal for her height so nothing to worry about. Would recommend once a year echocardiogram to monitor.     Best,  Dr. Vázquez

## 2021-03-04 NOTE — TELEPHONE ENCOUNTER
"2/5/21 cardiology consult with testing after. Has had no guidance on what to do next. Knows about the functional nutritionalist recommendations but feels she eats very limited carbs. Called the cardiology clinic twice. Has not gotten much response on tests done.   Informed pt she has a mychart message from today - less than an hour ago though which she has not read from cardiology. Direction is to f/u yearly.    Pt is very concerned about the elevated CRP - could be from any inflammation. (as she read on line)  Left arm hurts \"all the time\"-could it be bone or something else? Bone inflammation? Does Dr Lovell recommend Internal med/general practitioner? Or how to proceed.    Routing to  to advise.  Danyelle Dickson RN on 3/4/2021 at 12:43 PM    "

## 2021-03-04 NOTE — TELEPHONE ENCOUNTER
Patient has questions regarding her lab results from her appointment on 1/20. Please return call.

## 2021-03-05 NOTE — TELEPHONE ENCOUNTER
Yes, I think internal medicine would be the best bet and if she has specific arm pain then ortho. [facility] or [facility].

## 2021-03-16 NOTE — PROGRESS NOTES
SUBJECTIVE:                                                   Ana Dupree is a 65 year old female who presents to clinic today for the following health issue(s):  Patient presents with:  Recheck Medication: Follow up on Vyvanse, increased to 50mg, pt feels she is more conscious of what she eats and has no concerns.     HPI:  Patient is following up after one month on 50mg vyvanse after having done several months on 40mg.  Patient is feeling like her weight loss is so slow whenever I see her however has lost 8# in just the last 6 weeks and overall is now <200# and started at 225# and now 195#  Patient isn't sure if feels any increased effect from her increased dose of vyvanse or if it was just her own will power and power of suggestion that the bigger dose is more helpful  Was already eating quite clean with minimal carbs but after discussing some of her dietary choices she decided to do a more meditarannean diet approach and has been loving it.  Is cooking meals, they're single serving and fast. Likes the flavors and then isn't feeling like she does frozen meals with high sodium, etc  Has been working out at least 5x/week. Doing a walking julia through [employer] and has a stationary bike as well  Isn't ready to go back to the gym b/c isn't vaccinated yet but was doing classes through them that she liked  Daughter's wedding in august so is really motivated to get to a weight in the 180's if she can    Has had no side effects from the vyvanse. Better mood, more energy, less tired, more motivation and focus. Easier to not eat impulsively or big portions  No insomnia, HAs, or other issues    Patient had chest pain several months ago taht sounded like GERD. hwoever a cardiac CRP was elevated. Saw cards and did echo and stress test and EKG and everything was normal  Encouraged her to do yearly echos but ow just work on weight and maintaining good cholesterol and BP and blood sugars  Patient's chest pain all did  resolve with prilosec but is really concerned about why the cardiac CRP would be high    Now has a left arm/shoulder/bicep that is sore and it's concerning her for heart issues as well. However pain is only when she's laying in bed and mostly when on that side. Arm feels better when awake and moving it but sore in the bicep mostly. Has been going on for months but on and off and never thought about it until started to eval her crp and what it could mean. Has been a bit more sore the last few weeks as well. Had recommended she see ortho but she feels like she's seeing a lot of doctors    No LMP recorded. Patient is postmenopausal.    Patient is not sexually active, .  Using menopause for contraception.    reports that she has never smoked. She has never used smokeless tobacco.    STD testing offered?  Declined    Health maintenance updated:  no    Today's PHQ-2 Score:   PHQ-2 (  [employer]) 2019   Q1: Little interest or pleasure in doing things 0   Q2: Feeling down, depressed or hopeless 0   PHQ-2 Score 0     Today's PHQ-9 Score:   PHQ-9 SCORE 10/9/2020   PHQ-9 Total Score 5     Today's SAMIR-7 Score:   SAMIR-7 SCORE 10/9/2020   Total Score 4       Problem list and histories reviewed & adjusted, as indicated.  Additional history: as documented.    Patient Active Problem List   Diagnosis     Essential hypertension     Osteoporosis, senile     Morbid obesity (H)     Past Surgical History:   Procedure Laterality Date     BIOPSY BREAST  2001, 2014    excisional left side-B9 fibrocystic change. stereotactic bx left at 2 oclock for calcs. path-atypical ductal proliferation     COLONOSCOPY N/A 2017    Procedure: COMBINED COLONOSCOPY, SINGLE OR MULTIPLE BIOPSY/POLYPECTOMY BY BIOPSY;  Surgeon: Jerald Grant MD;  Location:  GI     LITHOTRIPSY  2007     MAMMOPLASTY REDUCTION  2014    Dr. Trujillo and Reji. had atypical cells noted on a bx but also wanted reduction so done together.      PARATHYROIDECTOMY Right 8/21/2015    Procedure: PARATHYROIDECTOMY;  Surgeon: Lynnette Sewell MD;  Location: [facility] OR     TUBAL LIGATION  12/14/1994    post partum      Social History     Tobacco Use     Smoking status: Never Smoker     Smokeless tobacco: Never Used   Substance Use Topics     Alcohol use: Yes     Frequency: Monthly or less     Drinks per session: 1 or 2     Binge frequency: Never      Problem (# of Occurrences) Relation (Name,Age of Onset)    Breast Cancer (1) Maternal Grandmother    Cerebrovascular Disease (2) Mother, Paternal Grandmother    Colon Cancer (1) Father (90)    Coronary Artery Disease (1) Mother    Diabetes (1) Mother    Hyperlipidemia (1) Mother    Hypertension (1) Mother    No Known Problems (4) Sister, Brother, Maternal Grandfather, Other    Other Cancer (1) Maternal Grandmother            Current Outpatient Medications   Medication Sig     calcium carb 1250 mg, 500 mg [place],/vitamin D 200 units (OSCAL WITH D) 500-200 MG-UNIT per tablet Take 2 tablets by mouth every 12 hours     IRON PO      lisdexamfetamine (VYVANSE) 50 MG capsule Take 1 capsule (50 mg) by mouth daily     [START ON 4/17/2021] lisdexamfetamine (VYVANSE) 50 MG capsule Take 1 capsule (50 mg) by mouth daily     [START ON 5/18/2021] lisdexamfetamine (VYVANSE) 50 MG capsule Take 1 capsule (50 mg) by mouth daily     lisdexamfetamine (VYVANSE) 50 MG capsule Take 1 capsule (50 mg) by mouth every morning     lisinopril (ZESTRIL) 20 MG tablet Take 1 tablet (20 mg) by mouth daily     UNABLE TO FIND MEDICATION NAME: Keratin     UNABLE TO FIND MEDICATION NAME: Rhodiola     UNABLE TO FIND MEDICATION NAME: Collagen     vitamin D3 (CHOLECALCIFEROL) 50 mcg (2000 units) tablet Take 1 tablet by mouth daily     lisdexamfetamine (VYVANSE) 50 MG capsule Take 1 capsule (50 mg) by mouth every morning     No current facility-administered medications for this visit.      No Known Allergies    ROS:  12 point review of systems negative other than  symptoms noted below or in the HPI.  No urinary frequency or dysuria, bladder or kidney problems      OBJECTIVE:     /68   Ht 1.524 m (5')   Wt 88.5 kg (195 lb)   BMI 38.08 kg/m    Body mass index is 38.08 kg/m .    Exam:  Constitutional:  Appearance: Well nourished, well developed alert, in no acute distress  Neck:  Lymph Nodes:  No lymphadenopathy present; Thyroid:  Gland size normal, nontender, no nodules or masses present on palpation  Chest:  Respiratory Effort:  Breathing unlabored. Clear to auscultation bilaterally.   Cardiovascular: Heart: Auscultation:  Regular rate, normal rhythm, no murmurs present  Skin: General Inspection:  No rashes present, no lesions present, no areas of discoloration.  Neurologic:  Mental Status:  Oriented X3.  Normal strength and tone, sensory exam grossly normal, mentation intact and speech normal.    Psychiatric:  Mentation appears normal and affect normal/bright.     In-Clinic Test Results:  No results found for this or any previous visit (from the past 24 hour(s)).    ASSESSMENT/PLAN:                                                        ICD-10-CM    1. Binge eating disorder  F50.81 lisdexamfetamine (VYVANSE) 50 MG capsule     lisdexamfetamine (VYVANSE) 50 MG capsule     lisdexamfetamine (VYVANSE) 50 MG capsule   2. High risk medication use  Z79.899 lisdexamfetamine (VYVANSE) 50 MG capsule     lisdexamfetamine (VYVANSE) 50 MG capsule     lisdexamfetamine (VYVANSE) 50 MG capsule   3. Class 2 obesity due to excess calories without serious comorbidity with body mass index (BMI) of 38.0 to 38.9 in adult  E66.09 lisdexamfetamine (VYVANSE) 50 MG capsule    Z68.38 lisdexamfetamine (VYVANSE) 50 MG capsule     lisdexamfetamine (VYVANSE) 50 MG capsule   4. Pain of left upper arm  M79.622          Patient is doing great on vyvanse 50mg for B.E as well as morbid obesity   Has no increased side effects after going from 40mg to 50mg and BP is normal and has had faster weight  loss  Reassured that not only is her weight loss appropriate rate but even faster than would have expected at her age and menopause status and rapid weight loss from restrictive diets isn't something she can maintain and not a good approach anyway  Changing habits like style of eating and frequency of exercise is the best approach  Will do 3 more months of vyvanse at 50mg and then do a phone f/u at that time.    Reviewed her arm pain. Does not sound CV in nature in anyway. Much more likely a shoulder or arm/muscle issue from increased exercise, sleeping on it and compression, etc  Again encouraged her to see ortho and consider imaging as bone spur, rotator cuff, labral tear, etc are all much more likely than CV  Encouraged to not worry about the cardiac CRP. It is very nonspecific. Her chest pain resolved with prilosec. Her stress echo is normal and cards had no other recs other than weight loss and yearly echo so will plan for that and not continue to shakir down other causes of this nonspecific test being abnormal    Oliva Lovell MD  [facility]

## 2021-03-17 ENCOUNTER — OFFICE VISIT (OUTPATIENT)
Dept: OBGYN | Facility: CLINIC | Age: 66
End: 2021-03-17
Payer: COMMERCIAL

## 2021-03-17 VITALS
HEIGHT: 60 IN | DIASTOLIC BLOOD PRESSURE: 68 MMHG | BODY MASS INDEX: 38.28 KG/M2 | WEIGHT: 195 LBS | SYSTOLIC BLOOD PRESSURE: 120 MMHG

## 2021-03-17 DIAGNOSIS — E66.09 CLASS 2 OBESITY DUE TO EXCESS CALORIES WITHOUT SERIOUS COMORBIDITY WITH BODY MASS INDEX (BMI) OF 38.0 TO 38.9 IN ADULT: ICD-10-CM

## 2021-03-17 DIAGNOSIS — F50.819 BINGE EATING DISORDER: Primary | ICD-10-CM

## 2021-03-17 DIAGNOSIS — M79.622 PAIN OF LEFT UPPER ARM: ICD-10-CM

## 2021-03-17 DIAGNOSIS — Z79.899 HIGH RISK MEDICATION USE: ICD-10-CM

## 2021-03-17 DIAGNOSIS — E66.812 CLASS 2 OBESITY DUE TO EXCESS CALORIES WITHOUT SERIOUS COMORBIDITY WITH BODY MASS INDEX (BMI) OF 38.0 TO 38.9 IN ADULT: ICD-10-CM

## 2021-03-17 PROCEDURE — 99213 OFFICE O/P EST LOW 20 MIN: CPT | Performed by: OBSTETRICS & GYNECOLOGY

## 2021-03-17 RX ORDER — LISDEXAMFETAMINE DIMESYLATE 50 MG/1
50 CAPSULE ORAL DAILY
Qty: 30 CAPSULE | Refills: 0 | Status: SHIPPED | OUTPATIENT
Start: 2021-04-17 | End: 2021-05-17

## 2021-03-17 RX ORDER — LISDEXAMFETAMINE DIMESYLATE 50 MG/1
50 CAPSULE ORAL DAILY
Qty: 30 CAPSULE | Refills: 0 | Status: SHIPPED | OUTPATIENT
Start: 2021-05-18 | End: 2021-05-10

## 2021-03-17 RX ORDER — LISDEXAMFETAMINE DIMESYLATE 50 MG/1
50 CAPSULE ORAL DAILY
Qty: 30 CAPSULE | Refills: 0 | Status: SHIPPED | OUTPATIENT
Start: 2021-03-17 | End: 2021-04-16

## 2021-03-17 ASSESSMENT — MIFFLIN-ST. JEOR: SCORE: 1356.01

## 2021-04-13 DIAGNOSIS — F50.819 BINGE EATING DISORDER: ICD-10-CM

## 2021-04-13 RX ORDER — LISDEXAMFETAMINE DIMESYLATE 50 MG/1
50 CAPSULE ORAL EVERY MORNING
Qty: 30 CAPSULE | Refills: 0 | OUTPATIENT
Start: 2021-04-13

## 2021-04-13 NOTE — TELEPHONE ENCOUNTER
Requested Prescriptions   Pending Prescriptions Disp Refills     lisdexamfetamine (VYVANSE) 50 MG capsule 30 capsule 0     Sig: Take 1 capsule (50 mg) by mouth every morning       There is no refill protocol information for this order        Last Written Prescription Date:  3/17/21  Last Fill Quantity: 30,  # refills: 0    **Also has 2 future prescriptions that were already sent that are good through 06/17/21**     Last office visit: 3/17/2021 with prescribing provider:  Dr Lovell   Future Office Visit:   None    Refills available  Becky Gunn RN on 4/13/2021 at 10:56 AM

## 2021-05-10 ENCOUNTER — HOSPITAL ENCOUNTER (INPATIENT)
Facility: CLINIC | Age: 66
LOS: 2 days | Discharge: HOME | DRG: 417 | End: 2021-05-12
Attending: EMERGENCY MEDICINE | Admitting: INTERNAL MEDICINE
Payer: COMMERCIAL

## 2021-05-10 ENCOUNTER — APPOINTMENT (OUTPATIENT)
Dept: ULTRASOUND IMAGING | Facility: CLINIC | Age: 66
DRG: 417 | End: 2021-05-10
Attending: EMERGENCY MEDICINE
Payer: COMMERCIAL

## 2021-05-10 DIAGNOSIS — K85.10 ACUTE GALLSTONE PANCREATITIS: ICD-10-CM

## 2021-05-10 LAB
ALBUMIN SERPL-MCNC: 3.7 G/DL (ref 3.4–5)
ALP SERPL-CCNC: 107 U/L (ref 40–150)
ALT SERPL W P-5'-P-CCNC: 52 U/L (ref 0–50)
ANION GAP SERPL CALCULATED.3IONS-SCNC: 3 MMOL/L (ref 3–14)
AST SERPL W P-5'-P-CCNC: 57 U/L (ref 0–45)
BASOPHILS # BLD AUTO: 0.1 10E9/L (ref 0–0.2)
BASOPHILS NFR BLD AUTO: 0.4 %
BILIRUB SERPL-MCNC: 0.9 MG/DL (ref 0.2–1.3)
BUN SERPL-MCNC: 16 MG/DL (ref 7–30)
CALCIUM SERPL-MCNC: 9.3 MG/DL (ref 8.5–10.1)
CHLORIDE SERPL-SCNC: 108 MMOL/L (ref 94–109)
CO2 SERPL-SCNC: 29 MMOL/L (ref 20–32)
CREAT SERPL-MCNC: 0.72 MG/DL (ref 0.52–1.04)
DIFFERENTIAL METHOD BLD: ABNORMAL
EOSINOPHIL # BLD AUTO: 0.1 10E9/L (ref 0–0.7)
EOSINOPHIL NFR BLD AUTO: 0.7 %
ERYTHROCYTE [DISTWIDTH] IN BLOOD BY AUTOMATED COUNT: 14.2 % (ref 10–15)
GFR SERPL CREATININE-BSD FRML MDRD: 88 ML/MIN/{1.73_M2}
GLUCOSE SERPL-MCNC: 133 MG/DL (ref 70–99)
HCT VFR BLD AUTO: 42.8 % (ref 35–47)
HGB BLD-MCNC: 13.5 G/DL (ref 11.7–15.7)
IMM GRANULOCYTES # BLD: 0.1 10E9/L (ref 0–0.4)
IMM GRANULOCYTES NFR BLD: 0.7 %
LABORATORY COMMENT REPORT: NORMAL
LIPASE SERPL-CCNC: ABNORMAL U/L (ref 73–393)
LYMPHOCYTES # BLD AUTO: 1.6 10E9/L (ref 0.8–5.3)
LYMPHOCYTES NFR BLD AUTO: 11.2 %
MCH RBC QN AUTO: 27.4 PG (ref 26.5–33)
MCHC RBC AUTO-ENTMCNC: 31.5 G/DL (ref 31.5–36.5)
MCV RBC AUTO: 87 FL (ref 78–100)
MONOCYTES # BLD AUTO: 0.5 10E9/L (ref 0–1.3)
MONOCYTES NFR BLD AUTO: 3.9 %
NEUTROPHILS # BLD AUTO: 11.5 10E9/L (ref 1.6–8.3)
NEUTROPHILS NFR BLD AUTO: 83.1 %
NRBC # BLD AUTO: 0 10*3/UL
NRBC BLD AUTO-RTO: 0 /100
PLATELET # BLD AUTO: 253 10E9/L (ref 150–450)
POTASSIUM SERPL-SCNC: 3.8 MMOL/L (ref 3.4–5.3)
PROT SERPL-MCNC: 8 G/DL (ref 6.8–8.8)
RBC # BLD AUTO: 4.92 10E12/L (ref 3.8–5.2)
SARS-COV-2 RNA RESP QL NAA+PROBE: NEGATIVE
SODIUM SERPL-SCNC: 140 MMOL/L (ref 133–144)
SPECIMEN SOURCE: NORMAL
TRIGL SERPL-MCNC: 106 MG/DL
TROPONIN I SERPL-MCNC: <0.015 UG/L (ref 0–0.04)
WBC # BLD AUTO: 13.8 10E9/L (ref 4–11)

## 2021-05-10 PROCEDURE — 99291 CRITICAL CARE FIRST HOUR: CPT | Mod: 25

## 2021-05-10 PROCEDURE — 258N000003 HC RX IP 258 OP 636: Performed by: INTERNAL MEDICINE

## 2021-05-10 PROCEDURE — 96376 TX/PRO/DX INJ SAME DRUG ADON: CPT

## 2021-05-10 PROCEDURE — 250N000011 HC RX IP 250 OP 636: Performed by: EMERGENCY MEDICINE

## 2021-05-10 PROCEDURE — 76705 ECHO EXAM OF ABDOMEN: CPT

## 2021-05-10 PROCEDURE — 99292 CRITICAL CARE ADDL 30 MIN: CPT

## 2021-05-10 PROCEDURE — 83690 ASSAY OF LIPASE: CPT | Performed by: EMERGENCY MEDICINE

## 2021-05-10 PROCEDURE — 96375 TX/PRO/DX INJ NEW DRUG ADDON: CPT

## 2021-05-10 PROCEDURE — 250N000011 HC RX IP 250 OP 636: Performed by: INTERNAL MEDICINE

## 2021-05-10 PROCEDURE — 96374 THER/PROPH/DIAG INJ IV PUSH: CPT

## 2021-05-10 PROCEDURE — 84484 ASSAY OF TROPONIN QUANT: CPT | Performed by: EMERGENCY MEDICINE

## 2021-05-10 PROCEDURE — C9803 HOPD COVID-19 SPEC COLLECT: HCPCS

## 2021-05-10 PROCEDURE — 258N000003 HC RX IP 258 OP 636: Performed by: EMERGENCY MEDICINE

## 2021-05-10 PROCEDURE — 80053 COMPREHEN METABOLIC PANEL: CPT | Performed by: EMERGENCY MEDICINE

## 2021-05-10 PROCEDURE — 99223 1ST HOSP IP/OBS HIGH 75: CPT | Mod: AI | Performed by: INTERNAL MEDICINE

## 2021-05-10 PROCEDURE — 99285 EMERGENCY DEPT VISIT HI MDM: CPT | Mod: 25

## 2021-05-10 PROCEDURE — 84478 ASSAY OF TRIGLYCERIDES: CPT | Performed by: EMERGENCY MEDICINE

## 2021-05-10 PROCEDURE — 96365 THER/PROPH/DIAG IV INF INIT: CPT

## 2021-05-10 PROCEDURE — 85025 COMPLETE CBC W/AUTO DIFF WBC: CPT | Performed by: EMERGENCY MEDICINE

## 2021-05-10 PROCEDURE — 96361 HYDRATE IV INFUSION ADD-ON: CPT

## 2021-05-10 PROCEDURE — U0003 INFECTIOUS AGENT DETECTION BY NUCLEIC ACID (DNA OR RNA); SEVERE ACUTE RESPIRATORY SYNDROME CORONAVIRUS 2 (SARS-COV-2) (CORONAVIRUS DISEASE [COVID-19]), AMPLIFIED PROBE TECHNIQUE, MAKING USE OF HIGH THROUGHPUT TECHNOLOGIES AS DESCRIBED BY CMS-2020-01-R: HCPCS | Performed by: INTERNAL MEDICINE

## 2021-05-10 PROCEDURE — U0005 INFEC AGEN DETEC AMPLI PROBE: HCPCS | Performed by: INTERNAL MEDICINE

## 2021-05-10 PROCEDURE — 120N000001 HC R&B MED SURG/OB

## 2021-05-10 RX ORDER — AMOXICILLIN 250 MG
1 CAPSULE ORAL 2 TIMES DAILY PRN
Status: DISCONTINUED | OUTPATIENT
Start: 2021-05-10 | End: 2021-05-12 | Stop reason: HOSPADM

## 2021-05-10 RX ORDER — ACETAMINOPHEN 650 MG/1
650 SUPPOSITORY RECTAL EVERY 4 HOURS PRN
Status: DISCONTINUED | OUTPATIENT
Start: 2021-05-10 | End: 2021-05-12 | Stop reason: HOSPADM

## 2021-05-10 RX ORDER — LIDOCAINE 40 MG/G
CREAM TOPICAL
Status: DISCONTINUED | OUTPATIENT
Start: 2021-05-10 | End: 2021-05-12 | Stop reason: HOSPADM

## 2021-05-10 RX ORDER — NALOXONE HYDROCHLORIDE 0.4 MG/ML
0.2 INJECTION, SOLUTION INTRAMUSCULAR; INTRAVENOUS; SUBCUTANEOUS
Status: DISCONTINUED | OUTPATIENT
Start: 2021-05-10 | End: 2021-05-12 | Stop reason: HOSPADM

## 2021-05-10 RX ORDER — PIPERACILLIN SODIUM, TAZOBACTAM SODIUM 3; .375 G/15ML; G/15ML
3.38 INJECTION, POWDER, LYOPHILIZED, FOR SOLUTION INTRAVENOUS EVERY 6 HOURS
Status: DISCONTINUED | OUTPATIENT
Start: 2021-05-10 | End: 2021-05-11

## 2021-05-10 RX ORDER — MORPHINE SULFATE 4 MG/ML
4 INJECTION, SOLUTION INTRAMUSCULAR; INTRAVENOUS
Status: DISCONTINUED | OUTPATIENT
Start: 2021-05-10 | End: 2021-05-11

## 2021-05-10 RX ORDER — OXYCODONE HYDROCHLORIDE 5 MG/1
5-10 TABLET ORAL
Status: DISCONTINUED | OUTPATIENT
Start: 2021-05-10 | End: 2021-05-12 | Stop reason: HOSPADM

## 2021-05-10 RX ORDER — NALOXONE HYDROCHLORIDE 0.4 MG/ML
0.4 INJECTION, SOLUTION INTRAMUSCULAR; INTRAVENOUS; SUBCUTANEOUS
Status: DISCONTINUED | OUTPATIENT
Start: 2021-05-10 | End: 2021-05-12 | Stop reason: HOSPADM

## 2021-05-10 RX ORDER — ONDANSETRON 4 MG/1
4 TABLET, ORALLY DISINTEGRATING ORAL EVERY 6 HOURS PRN
Status: DISCONTINUED | OUTPATIENT
Start: 2021-05-10 | End: 2021-05-12 | Stop reason: HOSPADM

## 2021-05-10 RX ORDER — SODIUM CHLORIDE, SODIUM LACTATE, POTASSIUM CHLORIDE, CALCIUM CHLORIDE 600; 310; 30; 20 MG/100ML; MG/100ML; MG/100ML; MG/100ML
INJECTION, SOLUTION INTRAVENOUS CONTINUOUS
Status: DISCONTINUED | OUTPATIENT
Start: 2021-05-10 | End: 2021-05-12 | Stop reason: HOSPADM

## 2021-05-10 RX ORDER — PIPERACILLIN SODIUM, TAZOBACTAM SODIUM 4; .5 G/20ML; G/20ML
4.5 INJECTION, POWDER, LYOPHILIZED, FOR SOLUTION INTRAVENOUS ONCE
Status: COMPLETED | OUTPATIENT
Start: 2021-05-10 | End: 2021-05-10

## 2021-05-10 RX ORDER — AMOXICILLIN 250 MG
2 CAPSULE ORAL 2 TIMES DAILY PRN
Status: DISCONTINUED | OUTPATIENT
Start: 2021-05-10 | End: 2021-05-12 | Stop reason: HOSPADM

## 2021-05-10 RX ORDER — POLYETHYLENE GLYCOL 3350 17 G/17G
17 POWDER, FOR SOLUTION ORAL DAILY PRN
Status: DISCONTINUED | OUTPATIENT
Start: 2021-05-10 | End: 2021-05-12 | Stop reason: HOSPADM

## 2021-05-10 RX ORDER — ACETAMINOPHEN 325 MG/1
650 TABLET ORAL EVERY 4 HOURS PRN
Status: DISCONTINUED | OUTPATIENT
Start: 2021-05-10 | End: 2021-05-12 | Stop reason: HOSPADM

## 2021-05-10 RX ORDER — HYDROMORPHONE HYDROCHLORIDE 1 MG/ML
.3-.5 INJECTION, SOLUTION INTRAMUSCULAR; INTRAVENOUS; SUBCUTANEOUS
Status: DISCONTINUED | OUTPATIENT
Start: 2021-05-10 | End: 2021-05-12 | Stop reason: HOSPADM

## 2021-05-10 RX ORDER — PROCHLORPERAZINE 25 MG
25 SUPPOSITORY, RECTAL RECTAL EVERY 12 HOURS PRN
Status: DISCONTINUED | OUTPATIENT
Start: 2021-05-10 | End: 2021-05-12 | Stop reason: HOSPADM

## 2021-05-10 RX ORDER — ONDANSETRON 2 MG/ML
4 INJECTION INTRAMUSCULAR; INTRAVENOUS EVERY 30 MIN PRN
Status: DISCONTINUED | OUTPATIENT
Start: 2021-05-10 | End: 2021-05-10

## 2021-05-10 RX ORDER — PROCHLORPERAZINE MALEATE 10 MG
10 TABLET ORAL EVERY 6 HOURS PRN
Status: DISCONTINUED | OUTPATIENT
Start: 2021-05-10 | End: 2021-05-12 | Stop reason: HOSPADM

## 2021-05-10 RX ORDER — SODIUM CHLORIDE 9 MG/ML
INJECTION, SOLUTION INTRAVENOUS CONTINUOUS
Status: DISCONTINUED | OUTPATIENT
Start: 2021-05-10 | End: 2021-05-10

## 2021-05-10 RX ORDER — LISINOPRIL 20 MG/1
20 TABLET ORAL DAILY
Status: DISCONTINUED | OUTPATIENT
Start: 2021-05-11 | End: 2021-05-12 | Stop reason: HOSPADM

## 2021-05-10 RX ORDER — BISACODYL 10 MG
10 SUPPOSITORY, RECTAL RECTAL DAILY PRN
Status: DISCONTINUED | OUTPATIENT
Start: 2021-05-10 | End: 2021-05-12 | Stop reason: HOSPADM

## 2021-05-10 RX ORDER — ONDANSETRON 2 MG/ML
4 INJECTION INTRAMUSCULAR; INTRAVENOUS EVERY 6 HOURS PRN
Status: DISCONTINUED | OUTPATIENT
Start: 2021-05-10 | End: 2021-05-12 | Stop reason: HOSPADM

## 2021-05-10 RX ADMIN — HYDROMORPHONE HYDROCHLORIDE 0.5 MG: 1 INJECTION, SOLUTION INTRAMUSCULAR; INTRAVENOUS; SUBCUTANEOUS at 23:18

## 2021-05-10 RX ADMIN — PIPERACILLIN SODIUM AND TAZOBACTAM SODIUM 3.38 G: 3; .375 INJECTION, POWDER, LYOPHILIZED, FOR SOLUTION INTRAVENOUS at 21:08

## 2021-05-10 RX ADMIN — SODIUM CHLORIDE, POTASSIUM CHLORIDE, SODIUM LACTATE AND CALCIUM CHLORIDE: 600; 310; 30; 20 INJECTION, SOLUTION INTRAVENOUS at 22:04

## 2021-05-10 RX ADMIN — MORPHINE SULFATE 4 MG: 4 INJECTION, SOLUTION INTRAMUSCULAR; INTRAVENOUS at 12:06

## 2021-05-10 RX ADMIN — SODIUM CHLORIDE 1000 ML: 9 INJECTION, SOLUTION INTRAVENOUS at 14:30

## 2021-05-10 RX ADMIN — HYDROMORPHONE HYDROCHLORIDE 0.5 MG: 1 INJECTION, SOLUTION INTRAMUSCULAR; INTRAVENOUS; SUBCUTANEOUS at 21:06

## 2021-05-10 RX ADMIN — ONDANSETRON 4 MG: 2 INJECTION INTRAMUSCULAR; INTRAVENOUS at 12:07

## 2021-05-10 RX ADMIN — MORPHINE SULFATE 4 MG: 4 INJECTION, SOLUTION INTRAMUSCULAR; INTRAVENOUS at 14:37

## 2021-05-10 RX ADMIN — SODIUM CHLORIDE 1000 ML: 9 INJECTION, SOLUTION INTRAVENOUS at 12:12

## 2021-05-10 RX ADMIN — SODIUM CHLORIDE, POTASSIUM CHLORIDE, SODIUM LACTATE AND CALCIUM CHLORIDE: 600; 310; 30; 20 INJECTION, SOLUTION INTRAVENOUS at 16:49

## 2021-05-10 RX ADMIN — PROCHLORPERAZINE EDISYLATE 10 MG: 5 INJECTION INTRAMUSCULAR; INTRAVENOUS at 16:15

## 2021-05-10 RX ADMIN — PIPERACILLIN SODIUM AND TAZOBACTAM SODIUM 4.5 G: 4; .5 INJECTION, POWDER, LYOPHILIZED, FOR SOLUTION INTRAVENOUS at 14:31

## 2021-05-10 RX ADMIN — HYDROMORPHONE HYDROCHLORIDE 0.3 MG: 1 INJECTION, SOLUTION INTRAMUSCULAR; INTRAVENOUS; SUBCUTANEOUS at 18:19

## 2021-05-10 ASSESSMENT — ACTIVITIES OF DAILY LIVING (ADL)
ADLS_ACUITY_SCORE: 17
ADLS_ACUITY_SCORE: 17

## 2021-05-10 ASSESSMENT — ENCOUNTER SYMPTOMS
DIAPHORESIS: 1
ABDOMINAL PAIN: 1
VOMITING: 1
NAUSEA: 1

## 2021-05-10 ASSESSMENT — MIFFLIN-ST. JEOR: SCORE: 1333.33

## 2021-05-10 NOTE — ED NOTES
[facility]  ED Nurse Handoff Report    ED Chief complaint: Chest Pain      ED Diagnosis:   Final diagnoses:   Acute gallstone pancreatitis       Code Status: to be discussed with hospitalist     Allergies: No Known Allergies    Patient Story: pt had sudden onset of mid to right sided epigastric pains today . She thought she was having a heart attack .   Focused Assessment:  Alert calm cooperative vitally stable . intermittent abd pain .     Treatments and/or interventions provided: pain meds nausea meds  Admission .   Patient's response to treatments and/or interventions: improved pain , more hydrated.     To be done/followed up on inpatient unit:  pain control     Does this patient have any cognitive concerns?: none     Activity level - Baseline/Home:  Independent  Activity Level - Current:   Independent    Patient's Preferred language: English   Needed?: No    Isolation: None  Infection: Not Applicable  Patient tested for COVID 19 prior to admission: YES  Bariatric?: No    Vital Signs:   Vitals:    05/10/21 1134 05/10/21 1135   BP: 119/80    Pulse: 79    Resp: 28    Temp: 98.6  F (37  C)    TempSrc: Oral    SpO2: 96%    Weight:  86.2 kg (190 lb)   Height:  1.524 m (5')       Cardiac Rhythm:     Was the PSS-3 completed:   Yes  What interventions are required if any?  None needed              Family Comments: dtr present and updated .   OBS brochure/video discussed/provided to patient/family: na               Name of person given brochure if not patient: na              Relationship to patient: na    For the majority of the shift this patient's behavior was Green.   Behavioral interventions performed were none needed .    ED NURSE PHONE NUMBER: [phone]

## 2021-05-10 NOTE — H&P
[facility]    History and Physical - Hospitalist Service       Date of Admission:  5/10/2021    Assessment & Plan   Ana Dupree is a 65 year-old female with history of hypertension, hyperlipidemia, obstructive sleep apnea, morbid obesity who presents with acute onset of upper abdominal pain, found to have acute pancreatitis. Admitted on 5/10/2021.    Gallstone pancreatitis  Possible mild cholecystitis  Presents with acute onset of band-like upper abdominal pain. Lipase 28k. Abdominal US with cholelithiasis, negative sonographic Lambert sign, no biliary dilatation, mild gallbladder wall thickening with cholecystitis not excluded.   - General surgery consulted for timing of cholecystectomy  - Continue piperacillin-tazobactam IV for now pending surgery evaluation   - NPO  - IVF with LR at 200 ml/hr   - Add on triglycerides  - Hydromorphone IV, oxycodone PO, anti-emetics PRN     Hypertension  - Continue prior to admission lisinopril     GERD  - Continue prior to admission PPI    KINGS  - Does not use CPAP     Morbid obesity  Body mass index is 37.11 kg/m . Increase in all-cause morbidity and mortality. Encourage weight loss.   - Hold prior to admission lisdexamfetamine     Diet: NPO for Medical/Clinical Reasons Except for: Meds    DVT Prophylaxis: Pneumatic Compression Devices  Chavez Catheter: not present  Code Status:   Full code     Disposition Plan   Admit to inpatient. Anticipate greater than or equal to 2 midnights prior to discharge.      Entered: Benjy Henderson MD 05/10/2021, 1:29 PM     The patient's care was discussed with the patient and patient's daughter     Benjy Henderson MD  [facility]    ______________________________________________________________________    Chief Complaint   Abdominal pain for 1 day    History of Present Illness   Ana Dupree is a 65 year old female who presents with the above chief complaint.    History is obtained  from the patient. The patient reports while at work at around 10 AM she developed sudden onset of severe, sharp, band-like pain across her upper abdomen. Denies any radiation to the back. She felt associated nausea with an episode of vomiting as well as diaphoresis.  She initially thought she had food poisoning however due to the persistent severe pain sought care for further evaluation.  She reports a history of nocturnal epigastric pain that has improved since starting omeprazole.  She had a mildly elevated cardiac CRP checked by her primary provider, underwent CT calcium screening and 2/19/2021 which was unremarkable.  She denies any prior history of gallbladder issues or pancreatitis.  She does not drink alcohol regularly. She denies any new medications.     In the Emergency Department, the patient was seen by Dr. Yulia Hutchins, with whom I discussed the patient's presenting symptoms and emergency department course.  Initial vital signs were a temperature of 98.6F, HR 79, /80, RR 28, SpO2 96% on RA. Work-up included WBC 13.8, lipase 56895, ALT 52, AST 57, Tbili normal, abdominal US with cholelithiasis, negative sonographic Lambert sign, no biliary dilatation, mild gallbladder wall thickening with cholecystitis not excluded. Hospitalists were contacted for admission to the hospital.     Review of Systems    Complete 10 point review of systems assessed and negative except as noted in HPI.    Past Medical History    I have reviewed this patient's medical history and updated it with pertinent information if needed.   Past Medical History:   Diagnosis Date     Essential hypertension 11/25/2016     Hyperlipidemia     elevated triglycerides     Hyperparathyroidism (H) 06/2014    referred to endo when PTH/calcium still high 11/14 and 12/14. seeing Dr. leos. dexa vinnie lcarke at hips and porosis wrist. disc. surgery 12/14     Hypertension      Morbid obesity (H)      Osteoporosis     osteopenia at hips and osteoporosis at  wrist according to dexa done through endo referral     Osteoporosis, senile 11/25/2016     Renal stones      Situational depression      Sleep apnea 2009    Doesn't use a CPAP       Past Surgical History   I have reviewed this patient's surgical history and updated it with pertinent information if needed.  Past Surgical History:   Procedure Laterality Date     BIOPSY BREAST  11/2001, 07/07/2014    excisional left side-B9 fibrocystic change. stereotactic bx left at 2 oclock for calcs. path-atypical ductal proliferation     COLONOSCOPY N/A 1/19/2017    Procedure: COMBINED COLONOSCOPY, SINGLE OR MULTIPLE BIOPSY/POLYPECTOMY BY BIOPSY;  Surgeon: Jerald Grant MD;  Location:  GI     LITHOTRIPSY  2007     MAMMOPLASTY REDUCTION  11/07/2014    Dr. Trujillo and Reji. had atypical cells noted on a bx but also wanted reduction so done together.     PARATHYROIDECTOMY Right 8/21/2015    Procedure: PARATHYROIDECTOMY;  Surgeon: Lynnette Sewell MD;  Location: [facility] OR     TUBAL LIGATION  12/14/1994    post partum         Social History   I have reviewed this patient's social history and updated it with pertinent information if needed.  Social History     Tobacco Use     Smoking status: Never Smoker     Smokeless tobacco: Never Used   Substance Use Topics     Alcohol use: Yes     Frequency: Monthly or less     Drinks per session: 1 or 2     Binge frequency: Never     Drug use: No     Works for [employer]     Family History   I have reviewed this patient's family history and updated it with pertinent information if needed.   Family History   Problem Relation Age of Onset     Hypertension Mother      Cerebrovascular Disease Mother      Diabetes Mother      Hyperlipidemia Mother      Coronary Artery Disease Mother      Colon Cancer Father 90     Other Cancer Maternal Grandmother      Breast Cancer Maternal Grandmother      Cerebrovascular Disease Paternal Grandmother      No Known Problems Sister      No Known  Problems Brother      No Known Problems Maternal Grandfather      No Known Problems Other        Prior to Admission Medications   Prior to Admission Medications   Prescriptions Last Dose Informant Patient Reported? Taking?   IRON PO 5/10/2021 at am  Yes Yes   Sig: Take 1 tablet by mouth daily    UNABLE TO FIND 5/10/2021 at am  Yes Yes   Sig: Take by mouth daily MEDICATION NAME: Keratin    UNABLE TO FIND 5/10/2021 at am  Yes Yes   Sig: Take by mouth daily MEDICATION NAME: Rhodiola    UNABLE TO FIND 5/10/2021 at am  Yes Yes   Sig: Take by mouth daily MEDICATION NAME: Collagen. powder   calcium carbonate-vitamin D (OSCAL W/D) 500-200 MG-UNIT tablet 5/10/2021 at am  Yes Yes   Sig: Take 1 tablet by mouth daily   lisdexamfetamine (VYVANSE) 50 MG capsule 5/10/2021 at am  No Yes   Sig: Take 1 capsule (50 mg) by mouth daily   lisinopril (ZESTRIL) 20 MG tablet 5/10/2021 at am  No Yes   Sig: Take 1 tablet (20 mg) by mouth daily   omeprazole (PRILOSEC) 20 MG DR capsule 5/10/2021 at am  Yes Yes   Sig: Take 20 mg by mouth daily   vitamin D3 (CHOLECALCIFEROL) 50 mcg (2000 units) tablet 5/10/2021 at am  Yes Yes   Sig: Take 1 tablet by mouth daily      Facility-Administered Medications: None     Allergies   No Known Allergies    Physical Exam   Vital Signs: Temp: 98.6  F (37  C) Temp src: Oral BP: 119/80 Pulse: 79   Resp: 28 SpO2: 96 % O2 Device: None (Room air)    Weight: 190 lbs 0 oz    Constitutional: Well-appearing, NAD  Eyes: PERRL, EOMI  HENT: Oropharynx clear, MMM  Respiratory: Clear to auscultation bilaterally, good air movement, normal effort   Cardiovascular: RRR, no m/r/g. No peripheral edema.   GI: Soft, tender to palpation in epigastric and LUQ, less so in RUQ. No rebound tenderness or guarding.   Skin: Warm, dry   Neurologic: Alert. Responding to questions appropriately. Following commands.  Psychiatric: Normal affect, appropriate      Data   Data reviewed today: I reviewed all medications, new labs and imaging results  over the last 24 hours. I personally reviewed no images or EKG's today.    Recent Labs   Lab 05/10/21  1202   WBC 13.8*   HGB 13.5   MCV 87         POTASSIUM 3.8   CHLORIDE 108   CO2 29   BUN 16   CR 0.72   ANIONGAP 3   CRYS 9.3   *   ALBUMIN 3.7   PROTTOTAL 8.0   BILITOTAL 0.9   ALKPHOS 107   ALT 52*   AST 57*   LIPASE 28,957*   TROPI <0.015       Recent Results (from the past 24 hour(s))   US Abdomen Limited    Narrative    ULTRASOUND ABDOMEN LIMITED May 10, 2021 12:45 PM    CLINICAL HISTORY: Right upper quadrant pain and vomiting.    TECHNIQUE: Limited abdominal ultrasound.    COMPARISON: None.    FINDINGS:    GALLBLADDER: Multiple shadowing gallstones. Negative sonographic  Lambert's sign. The gallbladder is mildly thickened at 3 mm.    BILE DUCTS: There is no biliary dilatation. The common duct measures 3  mm.    LIVER: Normal where seen.    RIGHT KIDNEY: No hydronephrosis.    PANCREAS: The visualized portions of the pancreas are normal.    No ascites.      Impression    IMPRESSION: Cholelithiasis. Negative sonographic Lambert's sign. No  biliary dilatation identified. There is mild gallbladder wall  thickening. Cholecystitis not excluded. HIDA scan could provide  further assessment if there is continued clinical concern.

## 2021-05-10 NOTE — PHARMACY-ADMISSION MEDICATION HISTORY
Pharmacy Medication History  Admission medication history interview status for the 5/10/2021  admission is complete. See EPIC admission navigator for prior to admission medications     Location of Interview: Patient room  Medication history sources: Patient and Surescripts    Significant changes made to the medication list:  Added Prilosec otc. Modified frequencies for multiple other meds    In the past week, patient estimated taking medication this percent of the time: greater than 90%    Additional medication history information:   none    Medication reconciliation completed by provider prior to medication history? No    Time spent in this activity: 15 minutes    Prior to Admission medications    Medication Sig Last Dose Taking? Auth Provider   calcium carbonate-vitamin D (OSCAL W/D) 500-200 MG-UNIT tablet Take 1 tablet by mouth daily 5/10/2021 at am Yes Unknown, Entered By History   IRON PO Take 1 tablet by mouth daily  5/10/2021 at am Yes Reported, Patient   lisdexamfetamine (VYVANSE) 50 MG capsule Take 1 capsule (50 mg) by mouth daily 5/10/2021 at am Yes Oliva Lovell MD   lisinopril (ZESTRIL) 20 MG tablet Take 1 tablet (20 mg) by mouth daily 5/10/2021 at am Yes Oliva Lovell MD   omeprazole (PRILOSEC) 20 MG DR capsule Take 20 mg by mouth daily 5/10/2021 at am Yes Unknown, Entered By History   UNABLE TO FIND Take by mouth daily MEDICATION NAME: Keratin  5/10/2021 at am Yes Reported, Patient   UNABLE TO FIND Take by mouth daily MEDICATION NAME: Rhodiola  5/10/2021 at am Yes Reported, Patient   UNABLE TO FIND Take by mouth daily MEDICATION NAME: Collagen. powder 5/10/2021 at am Yes Reported, Patient   vitamin D3 (CHOLECALCIFEROL) 50 mcg (2000 units) tablet Take 1 tablet by mouth daily 5/10/2021 at am Yes Reported, Patient       The information provided in this note is only as accurate as the sources available at the time of update(s)

## 2021-05-10 NOTE — PROGRESS NOTES
RECEIVING UNIT ED HANDOFF REVIEW    ED Nurse Handoff Report was reviewed by: Janie Watson RN on May 10, 2021 at 2:36 PM

## 2021-05-10 NOTE — ED PROVIDER NOTES
History     Chief Complaint:  Chest Pain     HPI:   Ana Dupree is a 65 year old female with a history of hypertension, hyperparathyroidism s/p parathyroidectomy, and renal stones who presents with chest pain. Patient reports sudden onset of chest and epigastric pain while at work today. She stood up and vomited. Upon arrival she is also diaphoretic. She mentions she has had food poisoning before and this felt similar. Patient also mentions she is quite thirsty.    Echocardiogram 2/19/21  1. The left ventricle is normal in structure, function and size. The visual   ejection fraction is estimated at 60%.   2. The right ventricle is normal in structure, function and size.   3. There is mild (1+) aortic regurgitation.   4. The ascending aorta is Mildly dilated. 3.7cm. Root normal at 3.5cm.     Review of Systems   Constitutional: Positive for diaphoresis.   Cardiovascular: Positive for chest pain.   Gastrointestinal: Positive for abdominal pain, nausea and vomiting.   All other systems reviewed and are negative.       Allergies:  No Known Allergies     Medications:    Oscal  Vyvanse  Lisinopril  Vitamin D3    Past Medical History:    Hypertension   hyperlipidemia   Hyperparathyroidism  Osteoporosis   Renal stones  Depression   Sleep apnea     Past Surgical History:    Breast biopsy  Lithotripsy   Mammoplasty reduction  Parathyroidectomy   Tubal ligation     Family History:    Hypertension   Cerebrovascular disease  Hyperlipidemia   CAD  Colon cancer  Breast cancer    Social History:  Patient presents with daughter.    Physical Exam     Vitals:  Patient Vitals for the past 24 hrs:   BP Temp Temp src Pulse Resp SpO2 Height Weight   05/10/21 1135 -- -- -- -- -- -- 1.524 m (5') 86.2 kg (190 lb)   05/10/21 1134 119/80 98.6  F (37  C) Oral 79 28 96 % -- --       Physical Exam:  General: Well-nourished, moaning and appears to be in pain   eyes: PERRL, conjunctivae pink no scleral icterus or conjunctival  injection  ENT:  Moist mucus membranes, posterior oropharynx clear without erythema or exudates  Respiratory:  Lungs clear to auscultation bilaterally, no crackles/rubs/wheezes.  Good air movement  CV: Normal rate and rhythm, no murmurs/rubs/gallops  GI:  Abdomen soft and non-distended.  Normoactive BS.  + Epigastric and right upper quadrant tenderness, no guarding or rebound  Skin: Warm, dry.  No rashes or petechiae  Musculoskeletal: No peripheral edema or calf tenderness  Neuro: Alert and oriented to person/place/time  Psychiatric: Anxious affect      Emergency Department Course     Imaging:  US Abdomen limited  Cholelithiasis. Negative sonographic Lambert's sign. No  biliary dilatation identified. There is mild gallbladder wall  thickening. Cholecystitis not excluded. HIDA scan could provide  further assessment if there is continued clinical concern.  Per radiology.    Laboratory:  CBC: WBC 13.89 (H), HGB 13.5,      CMP: Glucose 133 (H), ALT 52 (H), AST 57 (H) o/w WNL (Creatinine 0.72)     Troponin (Collected 1202): <0.015    Lipase: 28,957 (H)    Emergency Department Course:  Reviewed:  Past medical records, Care Everywhere, nursing notes, and vitals reviewed.     Assessments:  1137 I performed an exam of the patient and obtained history, as documented above.    Consults:  1330 I spoke with Dr. Henderson of hospitalist service regarding patient's presentation, findings, and plan of care.    Interventions:  Medications   0.9% sodium chloride BOLUS (1,000 mLs Intravenous New Bag 5/10/21 1212)     Followed by   sodium chloride 0.9% infusion (has no administration in time range)   ondansetron (ZOFRAN) injection 4 mg (4 mg Intravenous Given 5/10/21 1207)   morphine (PF) injection 4 mg (4 mg Intravenous Given 5/10/21 1437)   piperacillin-tazobactam (ZOSYN) 4.5 g vial to attach to  mL bag (4.5 g Intravenous New Bag 5/10/21 1431)   0.9% sodium chloride BOLUS (1,000 mLs Intravenous New Bag 5/10/21 1430)        Disposition:  The patient was admitted to the hospital under the care of Dr. Henderson.     Impression & Plan   Covid-19:  Ana Dupree was evaluated during a global COVID-19 pandemic, which necessitated consideration that the patient might be at risk for infection with the SARS-CoV-2 virus that causes COVID-19.   Applicable protocols for evaluation were followed during the patient's care.   COVID-19 was considered as part of the patient's evaluation. The plan for testing is:  a test was obtained during this visit.    Medical Decision Making:  Ana Dupree is a 65 year old female who presents to the emergency department today for evaluation of abdominal pain and vomiting.  She has tenderness in the epigastrium and right upper quadrant.  The symptom complex is most consistent with cholecystitis.  Laboratory studies reveal elevation of her white blood cell count, her transaminases as well as a very significant elevation of her lipase.  Right upper quadrant ultrasound is concerning for cholelithiasis with possible cholecystitis.  She was treated with pain medications and IV fluids.  She was also given a dose of IV antibiotics.  She was significantly improved to the point where she was asking to be discharged home, however given these findings I am very concerned and feel she needs to be admitted for treatment for acute gallstone pancreatitis.  I spoke with Dr. Henderson who graciously agreed to admit the patient.  He will place a surgical consult.  Patient and her daughter were updated and were in agreement with the plan.    Diagnosis:    ICD-10-CM    1. Acute gallstone pancreatitis  K85.10         Scribe Disclosure:  I, Mely Lyon, am serving as a scribe at 11:37 AM on 5/10/2021 to document services personally performed by Yulia Hutchins MD based on my observations and the provider's statements to me.       Mely Lyon  5/10/2021     Yulia Hutchins MD  05/10/21 2491

## 2021-05-11 ENCOUNTER — ANESTHESIA (OUTPATIENT)
Dept: SURGERY | Facility: CLINIC | Age: 66
DRG: 417 | End: 2021-05-11
Payer: COMMERCIAL

## 2021-05-11 ENCOUNTER — ANESTHESIA EVENT (OUTPATIENT)
Dept: SURGERY | Facility: CLINIC | Age: 66
DRG: 417 | End: 2021-05-11
Payer: COMMERCIAL

## 2021-05-11 ENCOUNTER — APPOINTMENT (OUTPATIENT)
Dept: GENERAL RADIOLOGY | Facility: CLINIC | Age: 66
DRG: 417 | End: 2021-05-11
Attending: INTERNAL MEDICINE
Payer: COMMERCIAL

## 2021-05-11 LAB
ALBUMIN SERPL-MCNC: 2.8 G/DL (ref 3.4–5)
ALP SERPL-CCNC: 65 U/L (ref 40–150)
ALT SERPL W P-5'-P-CCNC: 40 U/L (ref 0–50)
ANION GAP SERPL CALCULATED.3IONS-SCNC: 5 MMOL/L (ref 3–14)
AST SERPL W P-5'-P-CCNC: 26 U/L (ref 0–45)
BILIRUB SERPL-MCNC: 1.4 MG/DL (ref 0.2–1.3)
BUN SERPL-MCNC: 12 MG/DL (ref 7–30)
CALCIUM SERPL-MCNC: 8.4 MG/DL (ref 8.5–10.1)
CHLORIDE SERPL-SCNC: 108 MMOL/L (ref 94–109)
CO2 SERPL-SCNC: 26 MMOL/L (ref 20–32)
CREAT SERPL-MCNC: 0.68 MG/DL (ref 0.52–1.04)
ERYTHROCYTE [DISTWIDTH] IN BLOOD BY AUTOMATED COUNT: 14.6 % (ref 10–15)
GFR SERPL CREATININE-BSD FRML MDRD: >90 ML/MIN/{1.73_M2}
GLUCOSE SERPL-MCNC: 98 MG/DL (ref 70–99)
HCT VFR BLD AUTO: 33.2 % (ref 35–47)
HGB BLD-MCNC: 10.4 G/DL (ref 11.7–15.7)
LIPASE SERPL-CCNC: 1753 U/L (ref 73–393)
MCH RBC QN AUTO: 27.7 PG (ref 26.5–33)
MCHC RBC AUTO-ENTMCNC: 31.3 G/DL (ref 31.5–36.5)
MCV RBC AUTO: 89 FL (ref 78–100)
PLATELET # BLD AUTO: 214 10E9/L (ref 150–450)
POTASSIUM SERPL-SCNC: 4.1 MMOL/L (ref 3.4–5.3)
PROT SERPL-MCNC: 6.1 G/DL (ref 6.8–8.8)
RBC # BLD AUTO: 3.75 10E12/L (ref 3.8–5.2)
SODIUM SERPL-SCNC: 139 MMOL/L (ref 133–144)
WBC # BLD AUTO: 8.8 10E9/L (ref 4–11)

## 2021-05-11 PROCEDURE — 999N000179 XR SURGERY CARM FLUORO LESS THAN 5 MIN W STILLS

## 2021-05-11 PROCEDURE — 85027 COMPLETE CBC AUTOMATED: CPT | Performed by: SURGERY

## 2021-05-11 PROCEDURE — 120N000001 HC R&B MED SURG/OB

## 2021-05-11 PROCEDURE — 250N000011 HC RX IP 250 OP 636: Performed by: PHYSICIAN ASSISTANT

## 2021-05-11 PROCEDURE — 250N000013 HC RX MED GY IP 250 OP 250 PS 637: Performed by: NURSE ANESTHETIST, CERTIFIED REGISTERED

## 2021-05-11 PROCEDURE — 999N000141 HC STATISTIC PRE-PROCEDURE NURSING ASSESSMENT: Performed by: SURGERY

## 2021-05-11 PROCEDURE — 99232 SBSQ HOSP IP/OBS MODERATE 35: CPT | Performed by: HOSPITALIST

## 2021-05-11 PROCEDURE — 0FT44ZZ RESECTION OF GALLBLADDER, PERCUTANEOUS ENDOSCOPIC APPROACH: ICD-10-PCS | Performed by: SURGERY

## 2021-05-11 PROCEDURE — 360N000084 HC SURGERY LEVEL 4 W/ FLUORO, PER MIN: Performed by: SURGERY

## 2021-05-11 PROCEDURE — 258N000003 HC RX IP 258 OP 636: Performed by: HOSPITALIST

## 2021-05-11 PROCEDURE — 250N000011 HC RX IP 250 OP 636: Performed by: INTERNAL MEDICINE

## 2021-05-11 PROCEDURE — 710N000009 HC RECOVERY PHASE 1, LEVEL 1, PER MIN: Performed by: SURGERY

## 2021-05-11 PROCEDURE — 258N000003 HC RX IP 258 OP 636: Performed by: INTERNAL MEDICINE

## 2021-05-11 PROCEDURE — 99222 1ST HOSP IP/OBS MODERATE 55: CPT | Mod: 57 | Performed by: SURGERY

## 2021-05-11 PROCEDURE — 88304 TISSUE EXAM BY PATHOLOGIST: CPT | Mod: TC | Performed by: SURGERY

## 2021-05-11 PROCEDURE — 250N000009 HC RX 250: Performed by: SURGERY

## 2021-05-11 PROCEDURE — 250N000011 HC RX IP 250 OP 636: Performed by: SURGERY

## 2021-05-11 PROCEDURE — 47563 LAPARO CHOLECYSTECTOMY/GRAPH: CPT | Performed by: SURGERY

## 2021-05-11 PROCEDURE — BF131ZZ FLUOROSCOPY OF GALLBLADDER AND BILE DUCTS USING LOW OSMOLAR CONTRAST: ICD-10-PCS | Performed by: SURGERY

## 2021-05-11 PROCEDURE — 258N000003 HC RX IP 258 OP 636: Performed by: NURSE ANESTHETIST, CERTIFIED REGISTERED

## 2021-05-11 PROCEDURE — 250N000025 HC SEVOFLURANE, PER MIN: Performed by: SURGERY

## 2021-05-11 PROCEDURE — 88304 TISSUE EXAM BY PATHOLOGIST: CPT | Mod: 26 | Performed by: PATHOLOGY

## 2021-05-11 PROCEDURE — 250N000011 HC RX IP 250 OP 636: Performed by: NURSE ANESTHETIST, CERTIFIED REGISTERED

## 2021-05-11 PROCEDURE — 272N000001 HC OR GENERAL SUPPLY STERILE: Performed by: SURGERY

## 2021-05-11 PROCEDURE — 250N000013 HC RX MED GY IP 250 OP 250 PS 637: Performed by: PHYSICIAN ASSISTANT

## 2021-05-11 PROCEDURE — 250N000011 HC RX IP 250 OP 636: Performed by: ANESTHESIOLOGY

## 2021-05-11 PROCEDURE — 47563 LAPARO CHOLECYSTECTOMY/GRAPH: CPT | Mod: AS | Performed by: PHYSICIAN ASSISTANT

## 2021-05-11 PROCEDURE — 83690 ASSAY OF LIPASE: CPT | Performed by: SURGERY

## 2021-05-11 PROCEDURE — 370N000017 HC ANESTHESIA TECHNICAL FEE, PER MIN: Performed by: SURGERY

## 2021-05-11 PROCEDURE — 80053 COMPREHEN METABOLIC PANEL: CPT | Performed by: SURGERY

## 2021-05-11 PROCEDURE — 36415 COLL VENOUS BLD VENIPUNCTURE: CPT | Performed by: SURGERY

## 2021-05-11 PROCEDURE — 258N000003 HC RX IP 258 OP 636: Performed by: PHYSICIAN ASSISTANT

## 2021-05-11 PROCEDURE — 258N000001 HC RX 258: Performed by: SURGERY

## 2021-05-11 PROCEDURE — 250N000009 HC RX 250: Performed by: NURSE ANESTHETIST, CERTIFIED REGISTERED

## 2021-05-11 RX ORDER — ONDANSETRON 4 MG/1
4 TABLET, ORALLY DISINTEGRATING ORAL EVERY 30 MIN PRN
Status: DISCONTINUED | OUTPATIENT
Start: 2021-05-11 | End: 2021-05-11 | Stop reason: HOSPADM

## 2021-05-11 RX ORDER — IOPAMIDOL 612 MG/ML
INJECTION, SOLUTION INTRATHECAL PRN
Status: DISCONTINUED | OUTPATIENT
Start: 2021-05-11 | End: 2021-05-11 | Stop reason: HOSPADM

## 2021-05-11 RX ORDER — PROPOFOL 10 MG/ML
INJECTION, EMULSION INTRAVENOUS CONTINUOUS PRN
Status: DISCONTINUED | OUTPATIENT
Start: 2021-05-11 | End: 2021-05-11

## 2021-05-11 RX ORDER — NEOSTIGMINE METHYLSULFATE 1 MG/ML
VIAL (ML) INJECTION PRN
Status: DISCONTINUED | OUTPATIENT
Start: 2021-05-11 | End: 2021-05-11

## 2021-05-11 RX ORDER — MAGNESIUM HYDROXIDE 1200 MG/15ML
LIQUID ORAL PRN
Status: DISCONTINUED | OUTPATIENT
Start: 2021-05-11 | End: 2021-05-11 | Stop reason: HOSPADM

## 2021-05-11 RX ORDER — EPHEDRINE SULFATE 50 MG/ML
INJECTION, SOLUTION INTRAMUSCULAR; INTRAVENOUS; SUBCUTANEOUS PRN
Status: DISCONTINUED | OUTPATIENT
Start: 2021-05-11 | End: 2021-05-11

## 2021-05-11 RX ORDER — HYDROMORPHONE HYDROCHLORIDE 1 MG/ML
.3-.5 INJECTION, SOLUTION INTRAMUSCULAR; INTRAVENOUS; SUBCUTANEOUS EVERY 5 MIN PRN
Status: DISCONTINUED | OUTPATIENT
Start: 2021-05-11 | End: 2021-05-11 | Stop reason: HOSPADM

## 2021-05-11 RX ORDER — GLYCOPYRROLATE 0.2 MG/ML
INJECTION, SOLUTION INTRAMUSCULAR; INTRAVENOUS PRN
Status: DISCONTINUED | OUTPATIENT
Start: 2021-05-11 | End: 2021-05-11

## 2021-05-11 RX ORDER — FENTANYL CITRATE 50 UG/ML
25-50 INJECTION, SOLUTION INTRAMUSCULAR; INTRAVENOUS
Status: DISCONTINUED | OUTPATIENT
Start: 2021-05-11 | End: 2021-05-11 | Stop reason: HOSPADM

## 2021-05-11 RX ORDER — SODIUM CHLORIDE, SODIUM LACTATE, POTASSIUM CHLORIDE, CALCIUM CHLORIDE 600; 310; 30; 20 MG/100ML; MG/100ML; MG/100ML; MG/100ML
INJECTION, SOLUTION INTRAVENOUS CONTINUOUS
Status: DISCONTINUED | OUTPATIENT
Start: 2021-05-11 | End: 2021-05-11 | Stop reason: HOSPADM

## 2021-05-11 RX ORDER — DEXAMETHASONE SODIUM PHOSPHATE 4 MG/ML
INJECTION, SOLUTION INTRA-ARTICULAR; INTRALESIONAL; INTRAMUSCULAR; INTRAVENOUS; SOFT TISSUE PRN
Status: DISCONTINUED | OUTPATIENT
Start: 2021-05-11 | End: 2021-05-11

## 2021-05-11 RX ORDER — PROPOFOL 10 MG/ML
INJECTION, EMULSION INTRAVENOUS PRN
Status: DISCONTINUED | OUTPATIENT
Start: 2021-05-11 | End: 2021-05-11

## 2021-05-11 RX ORDER — ONDANSETRON 2 MG/ML
4 INJECTION INTRAMUSCULAR; INTRAVENOUS EVERY 30 MIN PRN
Status: DISCONTINUED | OUTPATIENT
Start: 2021-05-11 | End: 2021-05-11 | Stop reason: HOSPADM

## 2021-05-11 RX ORDER — LIDOCAINE HYDROCHLORIDE 20 MG/ML
INJECTION, SOLUTION INFILTRATION; PERINEURAL PRN
Status: DISCONTINUED | OUTPATIENT
Start: 2021-05-11 | End: 2021-05-11

## 2021-05-11 RX ORDER — ONDANSETRON 2 MG/ML
INJECTION INTRAMUSCULAR; INTRAVENOUS PRN
Status: DISCONTINUED | OUTPATIENT
Start: 2021-05-11 | End: 2021-05-11

## 2021-05-11 RX ORDER — ALBUTEROL SULFATE 90 UG/1
AEROSOL, METERED RESPIRATORY (INHALATION) PRN
Status: DISCONTINUED | OUTPATIENT
Start: 2021-05-11 | End: 2021-05-11

## 2021-05-11 RX ORDER — FENTANYL CITRATE 50 UG/ML
INJECTION, SOLUTION INTRAMUSCULAR; INTRAVENOUS PRN
Status: DISCONTINUED | OUTPATIENT
Start: 2021-05-11 | End: 2021-05-11

## 2021-05-11 RX ADMIN — SODIUM CHLORIDE, POTASSIUM CHLORIDE, SODIUM LACTATE AND CALCIUM CHLORIDE: 600; 310; 30; 20 INJECTION, SOLUTION INTRAVENOUS at 09:33

## 2021-05-11 RX ADMIN — SODIUM CHLORIDE, POTASSIUM CHLORIDE, SODIUM LACTATE AND CALCIUM CHLORIDE: 600; 310; 30; 20 INJECTION, SOLUTION INTRAVENOUS at 04:02

## 2021-05-11 RX ADMIN — DEXAMETHASONE SODIUM PHOSPHATE 4 MG: 4 INJECTION, SOLUTION INTRA-ARTICULAR; INTRALESIONAL; INTRAMUSCULAR; INTRAVENOUS; SOFT TISSUE at 09:56

## 2021-05-11 RX ADMIN — ROCURONIUM BROMIDE 40 MG: 10 INJECTION INTRAVENOUS at 09:53

## 2021-05-11 RX ADMIN — ONDANSETRON 4 MG: 2 INJECTION INTRAMUSCULAR; INTRAVENOUS at 10:05

## 2021-05-11 RX ADMIN — LIDOCAINE HYDROCHLORIDE 60 MG: 20 INJECTION, SOLUTION INFILTRATION; PERINEURAL at 09:46

## 2021-05-11 RX ADMIN — Medication 5 MG: at 10:01

## 2021-05-11 RX ADMIN — SODIUM CHLORIDE, POTASSIUM CHLORIDE, SODIUM LACTATE AND CALCIUM CHLORIDE: 600; 310; 30; 20 INJECTION, SOLUTION INTRAVENOUS at 22:08

## 2021-05-11 RX ADMIN — PIPERACILLIN SODIUM AND TAZOBACTAM SODIUM 3.38 G: 3; .375 INJECTION, POWDER, LYOPHILIZED, FOR SOLUTION INTRAVENOUS at 15:00

## 2021-05-11 RX ADMIN — PIPERACILLIN SODIUM AND TAZOBACTAM SODIUM 3.38 G: 3; .375 INJECTION, POWDER, LYOPHILIZED, FOR SOLUTION INTRAVENOUS at 08:52

## 2021-05-11 RX ADMIN — MIDAZOLAM 2 MG: 1 INJECTION INTRAMUSCULAR; INTRAVENOUS at 09:41

## 2021-05-11 RX ADMIN — HYDROMORPHONE HYDROCHLORIDE 0.5 MG: 1 INJECTION, SOLUTION INTRAMUSCULAR; INTRAVENOUS; SUBCUTANEOUS at 03:21

## 2021-05-11 RX ADMIN — SODIUM CHLORIDE, POTASSIUM CHLORIDE, SODIUM LACTATE AND CALCIUM CHLORIDE: 600; 310; 30; 20 INJECTION, SOLUTION INTRAVENOUS at 11:04

## 2021-05-11 RX ADMIN — PROPOFOL 150 MG: 10 INJECTION, EMULSION INTRAVENOUS at 09:46

## 2021-05-11 RX ADMIN — PROPOFOL 30 MCG/KG/MIN: 10 INJECTION, EMULSION INTRAVENOUS at 09:54

## 2021-05-11 RX ADMIN — FENTANYL CITRATE 100 MCG: 50 INJECTION, SOLUTION INTRAMUSCULAR; INTRAVENOUS at 09:46

## 2021-05-11 RX ADMIN — OXYCODONE HYDROCHLORIDE 5 MG: 5 TABLET ORAL at 16:45

## 2021-05-11 RX ADMIN — PHENYLEPHRINE HYDROCHLORIDE 50 MCG: 10 INJECTION INTRAVENOUS at 10:05

## 2021-05-11 RX ADMIN — ALBUTEROL SULFATE 6 PUFF: 90 AEROSOL, METERED RESPIRATORY (INHALATION) at 09:57

## 2021-05-11 RX ADMIN — FENTANYL CITRATE 50 MCG: 0.05 INJECTION, SOLUTION INTRAMUSCULAR; INTRAVENOUS at 12:27

## 2021-05-11 RX ADMIN — ROCURONIUM BROMIDE 10 MG: 10 INJECTION INTRAVENOUS at 10:16

## 2021-05-11 RX ADMIN — SUCCINYLCHOLINE CHLORIDE 100 MG: 20 INJECTION, SOLUTION INTRAMUSCULAR; INTRAVENOUS; PARENTERAL at 09:46

## 2021-05-11 RX ADMIN — OXYCODONE HYDROCHLORIDE 5 MG: 5 TABLET ORAL at 22:08

## 2021-05-11 RX ADMIN — Medication 10 MG: at 09:58

## 2021-05-11 RX ADMIN — GLYCOPYRROLATE 0.7 MG: 0.2 INJECTION, SOLUTION INTRAMUSCULAR; INTRAVENOUS at 11:02

## 2021-05-11 RX ADMIN — NEOSTIGMINE METHYLSULFATE 4 MG: 1 INJECTION, SOLUTION INTRAVENOUS at 11:02

## 2021-05-11 RX ADMIN — SODIUM CHLORIDE, POTASSIUM CHLORIDE, SODIUM LACTATE AND CALCIUM CHLORIDE: 600; 310; 30; 20 INJECTION, SOLUTION INTRAVENOUS at 15:00

## 2021-05-11 RX ADMIN — PIPERACILLIN SODIUM AND TAZOBACTAM SODIUM 3.38 G: 3; .375 INJECTION, POWDER, LYOPHILIZED, FOR SOLUTION INTRAVENOUS at 03:25

## 2021-05-11 ASSESSMENT — ACTIVITIES OF DAILY LIVING (ADL)
ADLS_ACUITY_SCORE: 17

## 2021-05-11 NOTE — CONSULTS
[facility] General Surgery Consultation    Ana Dupree MRN# [id number]   YOB: 1955 Age: 65 year old      Date of Admission:  5/10/2021  Date of Consult: 5/11/2021         Assessment and Plan:   Patient is a 65 year old female with cholecystitis and gallstone pancreatitis    PLAN:  - Medical management per primary team  -N.p.o. and IV fluids  -Pain control as needed  -Preoperative antibiotics  -Proceed to the operating room today for laparoscopic cholecystectomy with intraoperative cholangiograms.  Risks and benefits of the procedure discussed with the patient, who is in agreement with proceeding.         Requesting Physician:               Chief Complaint:     Chief Complaint   Patient presents with     Chest Pain          History of Present Illness:   Ana Dupree is a 65 year old female who was seen in consultation at the request of  who presented with chest pain.  Patient reports acute onset of lower mid chest pain starting yesterday morning.  She thought her symptoms were due to heartburn or food poisoning.  She then noticed pain in the midepigastric area of her abdomen.  She did have associated nausea/vomiting.  No fevers or chills.  She believes she may have had one previous episode of less severe symptoms.  She denies history of fatty food intolerance.  No significant family history of gallbladder issues.  Patient's past abdominal surgical history is significant for tubal ligation.  The patient does report that her abdominal pain is still present but has improved significantly since admission.          Physical Exam:   Blood pressure 106/51, pulse 60, temperature 98  F (36.7  C), temperature source Oral, resp. rate 18, height 1.524 m (5'), weight 86.2 kg (190 lb), SpO2 93 %, not currently breastfeeding.  190 lbs 0 oz  General: Vital signs reviewed, in no apparent distress  Eyes: Anicteric  HENT: Normocephalic, atraumatic, trachea midline    Respiratory: Breathing nonlabored  Cardiovascular: Regular rate and rhythm  GI: Abdomen soft, slightly distended, focally tender with palpation in the midepigastric/right upper quadrant areas  Musculoskeletal: No gross deformities  Neurologic: Grossly nonfocal exam  Psychiatric: Normal mood, affect and insight  Integumentary: Warm and dry         Past Medical History:     Past Medical History:   Diagnosis Date     Essential hypertension 11/25/2016     Hyperlipidemia     elevated triglycerides     Hyperparathyroidism (H) 06/2014    referred to endo when PTH/calcium still high 11/14 and 12/14. seeing Dr. leos. dexa wtih penia at hips and porosis wrist. disc. surgery 12/14     Hypertension      Morbid obesity (H)      Osteoporosis     osteopenia at hips and osteoporosis at wrist according to dexa done through endo referral     Osteoporosis, senile 11/25/2016     Renal stones      Situational depression      Sleep apnea 2009    Doesn't use a CPAP            Past Surgical History:     Past Surgical History:   Procedure Laterality Date     BIOPSY BREAST  11/2001, 07/07/2014    excisional left side-B9 fibrocystic change. stereotactic bx left at 2 oclock for calcs. path-atypical ductal proliferation     COLONOSCOPY N/A 1/19/2017    Procedure: COMBINED COLONOSCOPY, SINGLE OR MULTIPLE BIOPSY/POLYPECTOMY BY BIOPSY;  Surgeon: Jerald Grant MD;  Location:  GI     LITHOTRIPSY  2007     MAMMOPLASTY REDUCTION  11/07/2014    Dr. Trujillo and Reji. had atypical cells noted on a bx but also wanted reduction so done together.     PARATHYROIDECTOMY Right 8/21/2015    Procedure: PARATHYROIDECTOMY;  Surgeon: Lynnette Sewell MD;  Location: [facility] OR     TUBAL LIGATION  12/14/1994    post partum            Current Medications:           lisinopril  20 mg Oral Daily     omeprazole  20 mg Oral Daily     piperacillin-tazobactam  3.375 g Intravenous Q6H     sodium chloride (PF)  3 mL Intracatheter Q8H       acetaminophen,  acetaminophen, bisacodyl, HYDROmorphone, lidocaine 4%, lidocaine (buffered or not buffered), melatonin, morphine, naloxone **OR** naloxone **OR** naloxone **OR** naloxone, ondansetron **OR** ondansetron, oxyCODONE, polyethylene glycol, prochlorperazine **OR** prochlorperazine **OR** prochlorperazine, senna-docusate **OR** senna-docusate, sodium chloride (PF)         Home Medications:     Prior to Admission medications    Medication Sig Last Dose Taking? Auth Provider   calcium carbonate-vitamin D (OSCAL W/D) 500-200 MG-UNIT tablet Take 1 tablet by mouth daily 5/10/2021 at am Yes Unknown, Entered By History   IRON PO Take 1 tablet by mouth daily  5/10/2021 at am Yes Reported, Patient   lisdexamfetamine (VYVANSE) 50 MG capsule Take 1 capsule (50 mg) by mouth daily 5/10/2021 at am Yes Oliva Lovell MD   lisinopril (ZESTRIL) 20 MG tablet Take 1 tablet (20 mg) by mouth daily 5/10/2021 at am Yes Oliva Lovell MD   omeprazole (PRILOSEC) 20 MG DR capsule Take 20 mg by mouth daily 5/10/2021 at am Yes Unknown, Entered By History   UNABLE TO FIND Take by mouth daily MEDICATION NAME: Keratin  5/10/2021 at am Yes Reported, Patient   UNABLE TO FIND Take by mouth daily MEDICATION NAME: Rhodiola  5/10/2021 at am Yes Reported, Patient   UNABLE TO FIND Take by mouth daily MEDICATION NAME: Collagen. powder 5/10/2021 at am Yes Reported, Patient   vitamin D3 (CHOLECALCIFEROL) 50 mcg (2000 units) tablet Take 1 tablet by mouth daily 5/10/2021 at am Yes Reported, Patient            Allergies:   No Known Allergies         Family History:     Family History   Problem Relation Age of Onset     Hypertension Mother      Cerebrovascular Disease Mother      Diabetes Mother      Hyperlipidemia Mother      Coronary Artery Disease Mother      Colon Cancer Father 90     Other Cancer Maternal Grandmother      Breast Cancer Maternal Grandmother      Cerebrovascular Disease Paternal Grandmother      No Known Problems Sister      No Known Problems  Brother      No Known Problems Maternal Grandfather      No Known Problems Other            Social History:   Ana Dupree  reports that she has never smoked. She has never used smokeless tobacco. She reports current alcohol use. She reports that she does not use drugs.          Review of Systems:   Constitutional: No fevers or chills  Eyes: No blurred or double vision  HENT: Denies headaches, No rhinorrhea, reports sore throat  Respiratory: No cough or shortness of breath  Cardiovascular: Denies chest pain or palpitations  Gastrointestinal: Abdominal pain and nausea/vomiting  Genitourinary: No hematuria or dysuria  Musculoskeletal: Denies arthralgias or myalgias  Neurologic: No numbness or tingling  Integumentary: No skin rashes         Labs/Imaging   All new lab and imaging data was reviewed.   Recent Labs   Lab 05/10/21  1202   WBC 13.8*   HGB 13.5   HCT 42.8   MCV 87        Recent Labs   Lab 05/10/21  1202      POTASSIUM 3.8   CHLORIDE 108   CO2 29   ANIONGAP 3   *   BUN 16   CR 0.72   GFRESTIMATED 88   GFRESTBLACK >90   CRYS 9.3   PROTTOTAL 8.0   ALBUMIN 3.7   BILITOTAL 0.9   ALKPHOS 107   AST 57*   ALT 52*     No results for input(s): INR in the last 168 hours.  Recent Labs   Lab 05/10/21  1202   LIPASE 28,957*       I have personally reviewed the imaging studies-   ULTRASOUND ABDOMEN LIMITED May 10, 2021 12:45 PM     FINDINGS:     GALLBLADDER: Multiple shadowing gallstones. Negative sonographic  Lambert's sign. The gallbladder is mildly thickened at 3 mm.     BILE DUCTS: There is no biliary dilatation. The common duct measures 3  mm.     LIVER: Normal where seen.     RIGHT KIDNEY: No hydronephrosis.     PANCREAS: The visualized portions of the pancreas are normal.     No ascites.                                                                      IMPRESSION: Cholelithiasis. Negative sonographic Lambert's sign. No  biliary dilatation identified. There is mild gallbladder  wall  thickening. Cholecystitis not excluded. HIDA scan could provide  further assessment if there is continued clinical concern.      Gretchen Duenas MD

## 2021-05-11 NOTE — ANESTHESIA CARE TRANSFER NOTE
Patient: Ana Dupree    Procedure(s):  CHOLECYSTECTOMY, LAPAROSCOPIC, WITH CHOLANGIOGRAM    Diagnosis: Acute gallstone pancreatitis [K85.10]  Diagnosis Additional Information: No value filed.    Anesthesia Type:   General     Note:    Oropharynx: oropharynx clear of all foreign objects  Level of Consciousness: awake  Oxygen Supplementation: face mask    Independent Airway: airway patency satisfactory and stable  Dentition: dentition unchanged  Vital Signs Stable: post-procedure vital signs reviewed and stable  Report to RN Given: handoff report given  Patient transferred to: PACU    Handoff Report: Identifed the Patient, Identified the Reponsible Provider, Reviewed the pertinent medical history, Discussed the surgical course, Reviewed Intra-OP anesthesia mangement and issues during anesthesia, Set expectations for post-procedure period and Allowed opportunity for questions and acknowledgement of understanding      Vitals: (Last set prior to Anesthesia Care Transfer)  CRNA VITALS  5/11/2021 1047 - 5/11/2021 1121      5/11/2021             Resp Rate (set):  10        Electronically Signed By: AFIA Lemus CRNA  May 11, 2021  11:21 AM

## 2021-05-11 NOTE — PLAN OF CARE
Summary:  Acute pancreatitis.   DATE & TIME: 5/10/21 3-11pm   Cognitive Concerns/ Orientation : A&O x 4   BEHAVIOR & AGGRESSION TOOL COLOR: Green  CIWA SCORE: NA   ABNL VS/O2: VSS, HR tim-56, RA  MOBILITY: SBA  PAIN MANAGMENT: upper abdominal pain decreased with iv dilaudid and cold packs.  DIET: NPO except meds and ice chips  BOWEL/BLADDER: continent  ABNL LAB/BG: Lipase 28,957, WBC 13.8, ALT 52, AST 57  DRAIN/DEVICES: PIV, LR@200ml/hr  TELEMETRY RHYTHM: NA  SKIN: WDL  TESTS/PROCEDURES: US abdomen shows cholelithiasis. Covid test negative.  D/C DAY/GOALS/PLACE: pending  OTHER IMPORTANT INFO: surgery consult. Plan for Lap armani tomorrow.

## 2021-05-11 NOTE — OP NOTE
General Surgery Operative Note    PREOPERATIVE DIAGNOSIS:  Cholecystitis and gallstone pancreatitis     POSTOPERATIVE DIAGNOSIS:  Same    PROCEDURE: LAPAROSCOPIC CHOLECYSTECTOMY WITH INTRA-OPERATIVE CHOLANGIOGRAMS    ANESTHESIA:  General    SURGEON:  Gretchen Duenas MD    ASSISTANT:  Alex Frausto PA-C assisted in the above procedure. They provided assistance with pre-operative positioning, prepping, and draping of the patient. The assistant provided vital operative assistance with retraction using instruments thus providing the necessary exposure and visualization for the case, manipulation of tissues to achieve hemostasis, and assisted in closure of the wound. Post-operatively they assisted in transfer of the patient off the operative table and transition to the PACU physicians.    INDICATIONS:  Ana Dupree is a 65 year old female who presented with complaints of chest/abdominal pain. The patient was evaluated with a right upper quadrant ultrasound which revealed evidence of cholelithiasis and cholecystitis. The patient's LFTs were mildly elevated.  Lipase was elevated. The decision was made for the patient to proceed to the operating room for laparoscopic removal of the gallbladder with intraoperative cholangiograms. The risks and benefits of the procedure were discussed with the patient, and consent for the procedure was obtained.     PROCEDURE: The patient was brought to the preoperative area and preoperative antibiotics were administered. The patient was brought back to the operating room and placed in the supine position on the operating table. A time out was completed. Anesthesia was induced and the patient was intubated. The patient was secured to the operating table and their pressure points were padded. The patient's abdomen was prepped and draped in the sterile fashion. Following infiltration of local anesthetic, the 11 blade scalpel was used to make a small left upper quadrant incision.   Entrance into the abdomen was gained using the 5mm visiport. The patient's abdomen was then fully insufflated. The scope was then placed within the abdomen. Initial inspection revealed no evidence of injury at the port entry site. Under direct visualization, three additional ports were placed, one 5 mm supraumbilical port and two 5 mm right lateral ports.  The patient's left upper quadrant port was then upsized to an 11 mm port.  The gallbladder was identified and aspirated utilizing the laparoscopic needle.  Omental and duodenal adhesions were taken down using the hook electrocautery.  The gallbladder was then grasped, and retracted cephalad over the dome of the liver. The infundibulum of the gallbladder was grasped and retracted laterally. A combination of careful dissection utilizing the [place] dissector and hook electrocautery was then carried out to carefully dissect out the cystic duct and cystic artery. When both structures could be clearly seen to be coursing directly into the gallbladder and the critical view had been obtained, the Chapman clamp was brought onto the field and placed across the infundibulum of the gallbladder. The cholangiogram catheter was advanced into the cystic duct. Under fluoroscopic guidance we then obtained intraoperative cholangiograms which revealed no evidence of significant ductal abnormality or obstruction. The Chapman clamp and cholangiogram catheter were then removed and the patient's cystic duct was doubly clipped proximally, singly clipped distally, and divided. In a similar fashion, the cystic artery was doubly clipped proximally, singly clipped distally, and divided. The hook electrocautery was then used to carefully dissect the gallbladder free from its attachments to the liver bed. When the gallbladder had been completely dissected free, it was placed in an Endo Catch bag and removed through the left upper quadrant port. The port was reinserted and the surgical site  inspected. Hemostasis of the liver bed was obtained using the electrocautery. The patient's right upper quadrant was then irrigated with normal saline solution. The patient's left upper quadrant port was then removed and there was no evidence of bleeding at the port exit site.  The fascia was reapproximated using a figure-of-eight 0 Vicryl stitch and the Lobito-Julieta fascial closure device. The patient's 5 mm ports were then removed and there was no evidence of bleeding at the port exit sites. The patient's abdomen was then allowed to desufflate fully.  The port sites were then inspected and hemostasis was obtained using the electrocautery. The port sites were reapproximated using 4-0 Monocryl subcuticular stitches. The incisions were washed and dried and skin glue was applied. The lap, needle, and instrument counts were correct at the end of the procedure. The patient tolerated the procedure well and was extubated and taken to the recovery area in stable condition.     ESTIMATED BLOOD LOSS:  10 ml     INTRAOPERATIVE FINDINGS:  Gallbladder with stones and mild wall thickening.  Cholangiograms without obvious obstruction.      SPECIMENS: Gallbladder and contents     DRAINS: None    CONDITION: The patient will be readmitted to the Surgical Unit with instructions for postoperative cares and medications for pain management.      Gretchen Duenas MD

## 2021-05-11 NOTE — BRIEF OP NOTE
[facility]    Brief Operative Note    Pre-operative diagnosis: Acute gallstone pancreatitis [K85.10]  Post-operative diagnosis Acute Gallstone Pancreatitis    Procedure: Procedure(s):  CHOLECYSTECTOMY, LAPAROSCOPIC, WITH CHOLANGIOGRAM     Surgeon: Surgeon(s) and Role:     * Gretchen Duenas MD - Primary     * Alex Frausto PA-C - Assisting     Anesthesia: General   Estimated blood loss: Less than 10 ml  Drains: None  Specimens:   ID Type Source Tests Collected by Time Destination   A : GALLBLADDER AND CONTENTS Organ Gallbladder and Contents SURGICAL PATHOLOGY EXAM Gretchen Duenas MD 5/11/2021 10:54 AM      Findings:   None.  Negative IOC.  Complications: None.  Implants: * No implants in log *

## 2021-05-11 NOTE — ANESTHESIA PREPROCEDURE EVALUATION
Anesthesia Pre-Procedure Evaluation    Patient: Ana Dupree   MRN: [id number] : 1955        Preoperative Diagnosis: Acute gallstone pancreatitis [K85.10]   Procedure : Procedure(s):  CHOLECYSTECTOMY, LAPAROSCOPIC, WITH CHOLANGIOGRAM     Past Medical History:   Diagnosis Date     Essential hypertension 2016     Hyperlipidemia     elevated triglycerides     Hyperparathyroidism (H) 2014    referred to endo when PTH/calcium still high  and . seeing Dr. leos. dexa wtih penia at hips and porosis wrist. disc. surgery      Hypertension      Morbid obesity (H)      Osteoporosis     osteopenia at hips and osteoporosis at wrist according to dexa done through endo referral     Osteoporosis, senile 2016     Renal stones      Situational depression      Sleep apnea     Doesn't use a CPAP      Past Surgical History:   Procedure Laterality Date     BIOPSY BREAST  2001, 2014    excisional left side-B9 fibrocystic change. stereotactic bx left at 2 oclock for calcs. path-atypical ductal proliferation     COLONOSCOPY N/A 2017    Procedure: COMBINED COLONOSCOPY, SINGLE OR MULTIPLE BIOPSY/POLYPECTOMY BY BIOPSY;  Surgeon: Jerald Grant MD;  Location:  GI     LITHOTRIPSY       MAMMOPLASTY REDUCTION  2014    Dr. Trujillo and Reji. had atypical cells noted on a bx but also wanted reduction so done together.     PARATHYROIDECTOMY Right 2015    Procedure: PARATHYROIDECTOMY;  Surgeon: Lynnette Sewell MD;  Location: [facility] OR     TUBAL LIGATION  1994    post partum      No Known Allergies   Social History     Tobacco Use     Smoking status: Never Smoker     Smokeless tobacco: Never Used   Substance Use Topics     Alcohol use: Yes     Frequency: Monthly or less     Drinks per session: 1 or 2     Binge frequency: Never      Wt Readings from Last 1 Encounters:   05/10/21 86.2 kg (190 lb)        Anesthesia Evaluation   Pt has had prior anesthetic.          ROS/MED HX  ENT/Pulmonary:     (+) sleep apnea, doesn't use CPAP,     Neurologic:  - neg neurologic ROS     Cardiovascular: Comment: Donald Terry MD  [phone] 2021     Narrative & Impression    [id number]  [id number]  [id number]  [id number]^PAUL^OSMAN        [facility]  [facility]  Echocardiography Laboratory  [address]  [address]  [place] [address]  Phone [phone]  Fax [phone]        Name: CELINA CLAUDIO  MRN: [id number]  : 1955  Study Date: 2021 08:45 AM  Age: 65 yrs  Gender: Female  Patient Location: [facility]  Reason For Study: Essential hypertension  Ordering Physician: OSMAN MILLER  Referring Physician: Oliva Lovell  Performed By: Cash Rowley RDCS     BSA: 1.9 m2  Height: 60 in  Weight: 203 lb  HR: 57  BP: 118/74 mmHg  _____________________________________________________________________________  __     Procedure  Complete Echo Adult.     _____________________________________________________________________________  __        Interpretation Summary     1. The left ventricle is normal in structure, function and size. The visual  ejection fraction is estimated at 60%.  2. The right ventricle is normal in structure, function and size.  3. There is mild (1+) aortic regurgitation.  4. The ascending aorta is Mildly dilated. 3.7cm. Root normal at 3.5cm.     No previous echo for comparison.  _____________________________________________________________________________  __        Left Ventricle  The left ventricle is normal in structure, function and size. There is normal  left ventricular wall thickness. The visual ejection fraction is estimated at  60%. Left ventricular diastolic function is normal. Normal left ventricular  wall motion.     Right Ventricle  The right ventricle is normal in structure, function and size.     Atria  Normal left atrial size. Right atrial size is normal. There  is no atrial shunt  seen.     Mitral Valve  There is mild (1+) mitral regurgitation.     Tricuspid Valve  No tricuspid regurgitation. Right ventricular systolic pressure could not be  approximated due to inadequate tricuspid regurgitation.        Aortic Valve  There is mild (1+) aortic regurgitation.     Pulmonic Valve  The pulmonic valve is normal in structure and function.     Vessels  The ascending aorta is Mildly dilated. The inferior vena cava was normal in  size with preserved respiratory variability.     Pericardium  There is no pericardial effusion.     Rhythm  Sinus rhythm was noted.     _____________________________________________________________________________  __  MMode/2D Measurements & Calculations  RVDd: 2.8 cm  IVSd: 0.79 cm  LVIDd: 4.5 cm  LVIDs: 2.6 cm  LVPWd: 0.83 cm  FS: 42.3 %  LV mass(C)d: 115.1 grams  LV mass(C)dI: 61.3 grams/m2  Ao root diam: 3.5 cm  LA dimension: 3.1 cm     asc Aorta Diam: 3.7 cm  LA/Ao: 0.87  LA Volume Index (BP): 29.2 ml/m2  RWT: 0.37        Doppler Measurements & Calculations  MV E max booker: 87.7 cm/sec  MV A max booker: 62.2 cm/sec  MV E/A: 1.4  MV dec slope: 432.6 cm/sec2  MV dec time: 0.20 sec  PA acc time: 0.13 sec  E/E': 9.2     Peak E' Booker: 9.5 cm/sec           (+) Dyslipidemia hypertension-----    METS/Exercise Tolerance:     Hematologic:       Musculoskeletal:       GI/Hepatic: Comment: Gallstone pancreatitis    (+) GERD, Asymptomatic on medication,     Renal/Genitourinary: Comment: Previous parathroidectomy      Endo:     (+) Obesity,     Psychiatric/Substance Use:       Infectious Disease:       Malignancy:       Other:            Physical Exam    Airway        Mallampati: II   TM distance: > 3 FB   Neck ROM: full   Mouth opening: > 3 cm    Respiratory Devices and Support         Dental  no notable dental history         Cardiovascular   cardiovascular exam normal          Pulmonary   pulmonary exam normal                OUTSIDE LABS:  CBC:   Lab Results    Component Value Date    WBC 13.8 (H) 05/10/2021    WBC 6.4 11/11/2020    HGB 13.5 05/10/2021    HGB 11.9 11/11/2020    HCT 42.8 05/10/2021    HCT 36.9 11/11/2020     05/10/2021     11/11/2020     BMP:   Lab Results   Component Value Date     05/10/2021     11/11/2020    POTASSIUM 3.8 05/10/2021    POTASSIUM 4.0 11/11/2020    CHLORIDE 108 05/10/2021    CHLORIDE 107 11/11/2020    CO2 29 05/10/2021    CO2 25 11/11/2020    BUN 16 05/10/2021    BUN 13 11/11/2020    CR 0.72 05/10/2021    CR 0.66 11/11/2020     (H) 05/10/2021    GLC 95 11/11/2020     COAGS:   Lab Results   Component Value Date    INR 1.01 06/29/2010     POC:   Lab Results   Component Value Date     (H) 08/21/2015     HEPATIC:   Lab Results   Component Value Date    ALBUMIN 3.7 05/10/2021    PROTTOTAL 8.0 05/10/2021    ALT 52 (H) 05/10/2021    AST 57 (H) 05/10/2021    ALKPHOS 107 05/10/2021    BILITOTAL 0.9 05/10/2021     OTHER:   Lab Results   Component Value Date    A1C 5.4 11/11/2020    CRYS 9.3 05/10/2021    PHOS 2.7 11/23/2016    MAG 2.1 11/23/2016    LIPASE 28,957 (H) 05/10/2021    TSH 0.60 11/23/2016    T4 1.21 11/23/2016    CRP 11.0 01/20/2021       Anesthesia Plan    ASA Status:  2      Anesthesia Type: General.     - Airway: ETT   Induction: Intravenous, RSI, Propofol.           Consents    Anesthesia Plan(s) and associated risks, benefits, and realistic alternatives discussed. Questions answered and patient/representative(s) expressed understanding.     - Discussed with:  Patient         Postoperative Care    Pain management: IV analgesics, Oral pain medications.   PONV prophylaxis: Ondansetron (or other 5HT-3), Dexamethasone or Solumedrol, Background Propofol Infusion     Comments:                Alberto Stevenson MD

## 2021-05-11 NOTE — PLAN OF CARE
DATE & TIME: 5/10/21 0644-8109  Cognitive Concerns/ Orientation : A&Ox4   BEHAVIOR & AGGRESSION TOOL COLOR: Green  CIWA SCORE: NA   ABNL VS/O2: VSS on RA, tim at times.   MOBILITY: SBA  PAIN MANAGMENT: Pt c/o upper abdominal pain, managed with IV Dilaudid and cold packs.   DIET: NPO  BOWEL/BLADDER: continent  ABNL LAB/BG: Lipase 28,957, WBC 13.8, ALT 52, AST 57  DRAIN/DEVICES: PIV infusing LR @200ml/hr  TELEMETRY RHYTHM: NA  SKIN: WDL  TESTS/PROCEDURES: US abdomen shows cholelithiasis.   D/C DAY/GOALS/PLACE: Plan for Lap armani today.  OTHER IMPORTANT INFO: General surgery consulted.

## 2021-05-11 NOTE — PROGRESS NOTES
[facility]    Hospitalist Progress Note    Date of Admission:  5/10/2021    Assessment & Plan     Ana Dupree is a 65 year-old female with history of hypertension, hyperlipidemia, obstructive sleep apnea, morbid obesity who presents with acute onset of upper abdominal pain, found to have acute pancreatitis. Admitted on 5/10/2021.     Acute gallstone pancreatitis  Possible mild cholecystitis  Presents with acute onset of band-like upper abdominal pain. Lipase 28k. Abdominal US with cholelithiasis, negative sonographic Lambert sign, no biliary dilatation, mild gallbladder wall thickening with cholecystitis not excluded.   - General surgery consulted.  Patient underwent laparoscopic cholecystectomy with intraoperative cholangiogram on 5/11.  -Received Zosyn preoperatively.  No strong indication of ongoing infection.  Discontinue Zosyn.  -Started on clear liquid diet following the surgery.  - IVF with LR at 150 ml/hr   - Hydromorphone IV, oxycodone PO, anti-emetics PRN      Hypertension  - Continue prior to admission lisinopril      GERD  - Continue prior to admission PPI     KINGS  - Does not use CPAP      Morbid obesity  Body mass index is 37.11 kg/m . Increase in all-cause morbidity and mortality. Encourage weight loss.   - Hold prior to admission lisdexamfetamine        Diet:  Clear liquid diet  DVT Prophylaxis: Pneumatic Compression Devices  Chavez Catheter: not present  Code Status:   Full code           Zaina Hooks MD  Text Page (7am - 6pm, M-F)    Interval History   Patient seen in the preop unit prior to the surgery.  Stable overnight.  No fevers.  Has not had a BM.  Abdominal pain is controlled with pain medications.  No nausea or vomiting.    -Data reviewed today: I reviewed all new labs and imaging results over the last 24 hours.    Physical Exam   Temp: 97.2  F (36.2  C) Temp src: Oral BP: 136/70 Pulse: 74   Resp: 15 SpO2: 95 % O2 Device: None (Room air) Oxygen Delivery: 4  LPM  Vitals:    05/10/21 1135   Weight: 86.2 kg (190 lb)     Vital Signs with Ranges  Temp:  [97.2  F (36.2  C)-98.6  F (37  C)] 97.2  F (36.2  C)  Pulse:  [54-74] 74  Resp:  [15-24] 15  BP: (106-136)/(51-70) 136/70  SpO2:  [92 %-100 %] 95 %  I/O last 3 completed shifts:  In: 1000 [I.V.:1000]  Out: -     Constitutional: Alert, appears comfortable, in no acute distress  Respiratory: Non labored breathing, clear to auscultation bilaterally, no crackles or wheezes  Cardiovascular: Heart sounds regular rate and rhythm, no murmurs, no leg edema  GI: Abdomen is soft, non distended, mildly tender in epigastric region. Normal BS  Skin/Integumen: no rashes, no pressure sores  Neuro: alert, converses appropriately, moving all extremities, fluent speech, no facial asymmetry  Psych: mood and affect appropriate      Medications     lactated ringers 200 mL/hr at 05/11/21 1500     Another Antibiotic has been ordered.         lisinopril  20 mg Oral Daily     omeprazole  20 mg Oral Daily     piperacillin-tazobactam  3.375 g Intravenous Q6H     sodium chloride (PF)  3 mL Intracatheter Q8H       Data   Recent Labs   Lab 05/11/21  0813 05/10/21  1202   WBC 8.8 13.8*   HGB 10.4* 13.5   MCV 89 87    253    140   POTASSIUM 4.1 3.8   CHLORIDE 108 108   CO2 26 29   BUN 12 16   CR 0.68 0.72   ANIONGAP 5 3   CRYS 8.4* 9.3   GLC 98 133*   ALBUMIN 2.8* 3.7   PROTTOTAL 6.1* 8.0   BILITOTAL 1.4* 0.9   ALKPHOS 65 107   ALT 40 52*   AST 26 57*   LIPASE 1,753* 28,957*   TROPI  --  <0.015       Imaging  Recent Results (from the past 24 hour(s))   XR Surgery GOPI L/T 5 Min Fluoro w Stills    Narrative    SURGERY C-ARM FLUOROSCOPY LESS THAN FIVE MINUTES WITH STILLS   5/11/2021 10:45 AM     HISTORY: Cholangiograms    COMPARISON: None.      Impression    IMPRESSION: Two spot fluoroscopic images obtained during  cholecystectomy. Common bile duct is patent with contrast extending  into the duodenum. Portions of the pancreatic duct also visible.  No  suggestion of intrahepatic biliary dilatation. Total fluoroscopic time  is 0.2 minutes.    BELKYS STEVENS MD

## 2021-05-11 NOTE — ANESTHESIA POSTPROCEDURE EVALUATION
Patient: Ana Dupree    Procedure(s):  CHOLECYSTECTOMY, LAPAROSCOPIC, WITH CHOLANGIOGRAM    Diagnosis:Acute gallstone pancreatitis [K85.10]  Diagnosis Additional Information: No value filed.    Anesthesia Type:  General    Note:  Disposition: Inpatient   Postop Pain Control: Uneventful            Sign Out: Well controlled pain   PONV: No   Neuro/Psych: Uneventful            Sign Out: Acceptable/Baseline neuro status   Airway/Respiratory: Uneventful            Sign Out: Acceptable/Baseline resp. status   CV/Hemodynamics: Uneventful            Sign Out: Acceptable CV status; No obvious hypovolemia; No obvious fluid overload   Other NRE: NONE   DID A NON-ROUTINE EVENT OCCUR? No           Last vitals:  Vitals:    05/11/21 1150 05/11/21 1200 05/11/21 1210   BP: 132/69 126/70 124/66   Pulse: 65 58 54   Resp: 24 24 19   Temp: 37  C (98.6  F)     SpO2: 100% 100% 98%       Last vitals prior to Anesthesia Care Transfer:  CRNA VITALS  5/11/2021 1047 - 5/11/2021 1147      5/11/2021             NIBP:  129/75    Pulse:  75    SpO2:  99 %    Resp Rate (observed):  12          Electronically Signed By: Alberto Stevenson MD  May 11, 2021  12:15 PM

## 2021-05-11 NOTE — ANESTHESIA PROCEDURE NOTES
Airway       Patient location during procedure: OR       Procedure Start/Stop Times: 5/11/2021 9:47 AM  Staff -        Anesthesiologist:  Alberto Stevenson MD       CRNA: Elida Alcala APRN CRNA       Performed By: CRNA  Consent for Airway        Urgency: elective  Indications and Patient Condition       Indications for airway management: romel-procedural       Induction type:RSI       Mask difficulty assessment: 0 - not attempted    Final Airway Details       Final airway type: endotracheal airway       Successful airway: ETT - single  Endotracheal Airway Details        ETT size (mm): 7.0       Cuffed: yes       Successful intubation technique: direct laryngoscopy       DL Blade Type: Kwan 2       Grade View of Cords: 1       Adjucts: stylet       Position: Right       Measured from: gums/teeth       Secured at (cm): 23       Bite block used: None    Post intubation assessment        Placement verified by: capnometry, equal breath sounds and chest rise        Number of attempts at approach: 1       Secured with: pink tape and commercial tube vang       Ease of procedure: easy       Dentition: Intact and Unchanged    Medication(s) Administered   Medication Administration Time: 5/11/2021 9:47 AM

## 2021-05-11 NOTE — PLAN OF CARE
Summary:  Acute pancreatitis. POD # 0 ranjana  DATE & TIME: 5/11/21 9220-9449  Cognitive Concerns/ Orientation : A&Ox4   BEHAVIOR & AGGRESSION TOOL COLOR: Green  CIWA SCORE: NA   ABNL VS/O2: VSS on RA, tim at times 55-59bpm.   MOBILITY: SBA  PAIN MANAGMENT: Pt c/o upper abdominal and L shoulder pain , 1 tab oxy effective.    DIET: clear liquid , tolerating  BOWEL/BLADDER: continent, passing flatus  ABNL LAB/BG: Lipase 1753  DRAIN/DEVICES: PIV infusing LR @200ml/hr  TELEMETRY RHYTHM: NA  SKIN: WDL execept 4surgical incisions PJ,  w/ glue only  TESTS/PROCEDURES: fernando lomeli completed  D/C DAY/GOALS/PLACE: discharge tomorrow pending labs, diet tolerance, and pain control  OTHER IMPORTANT INFO: General surgery following.

## 2021-05-12 VITALS
TEMPERATURE: 97.5 F | HEART RATE: 62 BPM | OXYGEN SATURATION: 96 % | WEIGHT: 190 LBS | SYSTOLIC BLOOD PRESSURE: 106 MMHG | HEIGHT: 60 IN | BODY MASS INDEX: 37.3 KG/M2 | RESPIRATION RATE: 16 BRPM | DIASTOLIC BLOOD PRESSURE: 50 MMHG

## 2021-05-12 LAB
ALBUMIN SERPL-MCNC: 2.8 G/DL (ref 3.4–5)
ALP SERPL-CCNC: 73 U/L (ref 40–150)
ALT SERPL W P-5'-P-CCNC: 45 U/L (ref 0–50)
ANION GAP SERPL CALCULATED.3IONS-SCNC: 2 MMOL/L (ref 3–14)
AST SERPL W P-5'-P-CCNC: 33 U/L (ref 0–45)
BASOPHILS # BLD AUTO: 0 10E9/L (ref 0–0.2)
BASOPHILS NFR BLD AUTO: 0.2 %
BILIRUB SERPL-MCNC: 1.4 MG/DL (ref 0.2–1.3)
BUN SERPL-MCNC: 8 MG/DL (ref 7–30)
CALCIUM SERPL-MCNC: 8.8 MG/DL (ref 8.5–10.1)
CHLORIDE SERPL-SCNC: 109 MMOL/L (ref 94–109)
CO2 SERPL-SCNC: 28 MMOL/L (ref 20–32)
CREAT SERPL-MCNC: 0.65 MG/DL (ref 0.52–1.04)
DIFFERENTIAL METHOD BLD: ABNORMAL
EOSINOPHIL # BLD AUTO: 0.1 10E9/L (ref 0–0.7)
EOSINOPHIL NFR BLD AUTO: 0.7 %
ERYTHROCYTE [DISTWIDTH] IN BLOOD BY AUTOMATED COUNT: 14.8 % (ref 10–15)
GFR SERPL CREATININE-BSD FRML MDRD: >90 ML/MIN/{1.73_M2}
GLUCOSE SERPL-MCNC: 82 MG/DL (ref 70–99)
HCT VFR BLD AUTO: 32.5 % (ref 35–47)
HGB BLD-MCNC: 10 G/DL (ref 11.7–15.7)
IMM GRANULOCYTES # BLD: 0 10E9/L (ref 0–0.4)
IMM GRANULOCYTES NFR BLD: 0.3 %
LIPASE SERPL-CCNC: 254 U/L (ref 73–393)
LYMPHOCYTES # BLD AUTO: 1.6 10E9/L (ref 0.8–5.3)
LYMPHOCYTES NFR BLD AUTO: 16.2 %
MCH RBC QN AUTO: 27.3 PG (ref 26.5–33)
MCHC RBC AUTO-ENTMCNC: 30.8 G/DL (ref 31.5–36.5)
MCV RBC AUTO: 89 FL (ref 78–100)
MONOCYTES # BLD AUTO: 0.7 10E9/L (ref 0–1.3)
MONOCYTES NFR BLD AUTO: 6.8 %
NEUTROPHILS # BLD AUTO: 7.4 10E9/L (ref 1.6–8.3)
NEUTROPHILS NFR BLD AUTO: 75.8 %
NRBC # BLD AUTO: 0 10*3/UL
NRBC BLD AUTO-RTO: 0 /100
PLATELET # BLD AUTO: 202 10E9/L (ref 150–450)
POTASSIUM SERPL-SCNC: 3.5 MMOL/L (ref 3.4–5.3)
PROT SERPL-MCNC: 6.3 G/DL (ref 6.8–8.8)
RBC # BLD AUTO: 3.66 10E12/L (ref 3.8–5.2)
SODIUM SERPL-SCNC: 139 MMOL/L (ref 133–144)
WBC # BLD AUTO: 9.8 10E9/L (ref 4–11)

## 2021-05-12 PROCEDURE — 250N000013 HC RX MED GY IP 250 OP 250 PS 637: Performed by: PHYSICIAN ASSISTANT

## 2021-05-12 PROCEDURE — 258N000003 HC RX IP 258 OP 636: Performed by: HOSPITALIST

## 2021-05-12 PROCEDURE — 250N000013 HC RX MED GY IP 250 OP 250 PS 637: Performed by: INTERNAL MEDICINE

## 2021-05-12 PROCEDURE — 36415 COLL VENOUS BLD VENIPUNCTURE: CPT | Performed by: PHYSICIAN ASSISTANT

## 2021-05-12 PROCEDURE — 80053 COMPREHEN METABOLIC PANEL: CPT | Performed by: PHYSICIAN ASSISTANT

## 2021-05-12 PROCEDURE — 99239 HOSP IP/OBS DSCHRG MGMT >30: CPT | Performed by: HOSPITALIST

## 2021-05-12 PROCEDURE — 85025 COMPLETE CBC W/AUTO DIFF WBC: CPT | Performed by: PHYSICIAN ASSISTANT

## 2021-05-12 PROCEDURE — 83690 ASSAY OF LIPASE: CPT | Performed by: PHYSICIAN ASSISTANT

## 2021-05-12 RX ORDER — OXYCODONE HYDROCHLORIDE 5 MG/1
5-10 TABLET ORAL
Qty: 8 TABLET | Refills: 0 | Status: SHIPPED | OUTPATIENT
Start: 2021-05-12 | End: 2021-06-11

## 2021-05-12 RX ORDER — AMOXICILLIN 250 MG
1 CAPSULE ORAL 2 TIMES DAILY PRN
Qty: 8 TABLET | Refills: 0 | Status: SHIPPED | OUTPATIENT
Start: 2021-05-12 | End: 2021-06-19

## 2021-05-12 RX ADMIN — LISINOPRIL 20 MG: 20 TABLET ORAL at 08:21

## 2021-05-12 RX ADMIN — OMEPRAZOLE 20 MG: 20 CAPSULE, DELAYED RELEASE ORAL at 08:21

## 2021-05-12 RX ADMIN — SODIUM CHLORIDE, POTASSIUM CHLORIDE, SODIUM LACTATE AND CALCIUM CHLORIDE: 600; 310; 30; 20 INJECTION, SOLUTION INTRAVENOUS at 04:41

## 2021-05-12 RX ADMIN — OXYCODONE HYDROCHLORIDE 5 MG: 5 TABLET ORAL at 02:01

## 2021-05-12 RX ADMIN — OXYCODONE HYDROCHLORIDE 5 MG: 5 TABLET ORAL at 08:21

## 2021-05-12 ASSESSMENT — ACTIVITIES OF DAILY LIVING (ADL)
ADLS_ACUITY_SCORE: 14

## 2021-05-12 NOTE — PLAN OF CARE
Discharge    Patient discharged to home with daughter  Care plan note: Patient alert/orient X4, up independently in room/hallway. Vss, complained of abdominal discomfort/oxy given with relief. Tolerating regular diet, passed flatus/voiding fine. Discharging to home, instructions gone over & pt read back without problems.     Listed belongings gathered and returned to patient. Yes  Belongings returned to patient from security/pharmacy Yes  Care Plan and Patient education resolved: Yes  Prescriptions if needed, hard copies sent with patient  NA  Home and hospital acquired medications returned to patient: NA  Medication Bin checked and emptied on discharge Yes  Follow up appointment made for patient: Yes

## 2021-05-12 NOTE — DISCHARGE SUMMARY
Discharge Summary  Hospitalist    Date of Admission:  5/10/2021  Date of Discharge:  5/12/2021  Discharging Provider: Zaina Hooks MD  Date of Service (when I saw the patient): 05/12/21    Discharge Diagnoses   Acute gallstone pancreatitis s/p laparoscopic cholecystectomy  Possible mild cholecystitis  HTN  GERD  KINGS    History of Present Illness   Please refer H & P for details.      Hospital Course      Ana Dupree is a 65 year-old female with history of hypertension, hyperlipidemia, obstructive sleep apnea, morbid obesity who presents with acute onset of upper abdominal pain, found to have acute pancreatitis. Admitted on 5/10/2021.     Acute gallstone pancreatitis  Possible mild cholecystitis  Presents with acute onset of band-like upper abdominal pain. Lipase 28k. Abdominal US with cholelithiasis, negative sonographic Lambert sign, no biliary dilatation, mild gallbladder wall thickening with cholecystitis not excluded.   - General surgery consulted.  Patient underwent laparoscopic cholecystectomy with intraoperative cholangiogram on 5/11.  -Received Zosyn preoperatively.  No strong indication of ongoing infection.  Discontinued Zosyn.  -Started on clear liquid diet following the surgery.  Advance to regular diet the following day which he tolerated well.  - IVF with LR at 150 ml/hr   - Hydromorphone IV, oxycodone PO, anti-emetics PRN   -Lipase normalized by day of discharge and she was tolerating diet with no abdominal pain.     Hypertension  - Continue prior to admission lisinopril      GERD  - Continue prior to admission PPI     KINGS  - Does not use CPAP      Morbid obesity  Body mass index is 37.11 kg/m . Increase in all-cause morbidity and mortality. Encourage weight loss.   - Hold prior to admission lisdexamfetamine      Patient is discharged home in stable condition.    Zaina Hooks MD, MD      Pending Results   These results will be followed up by Hospitalist team.  Unresulted Labs Ordered in  the Past 30 Days of this Admission     Date and Time Order Name Status Description    5/11/2021 1054 Surgical pathology exam In process           Code Status   Full Code       Primary Care Physician   Oliva Lovell    Follow-ups Needed After Discharge   Follow-up Appointments     Follow-up and recommended labs and tests      Our office will contact you within 2 weeks to check on your progress and   answer any questions you may have.  If you are doing well, you will not   need to return for a follow up appointment.  If any concerns are   identified over the phone, we may ask you to make an appointment to see a   provider in our clinic.   If you have not received a phone call, have any questions or concerns, or   would like to be seen, please call us at [phone] and ask to speak   with our nurse.  We are located at [address] [place] [address].             Physical Exam   Temp: 97.5  F (36.4  C) Temp src: Oral BP: 106/50 Pulse: 62   Resp: 16 SpO2: 96 % O2 Device: None (Room air)    Vitals:    05/10/21 1135   Weight: 86.2 kg (190 lb)     Vital Signs with Ranges  Temp:  [97.2  F (36.2  C)-97.5  F (36.4  C)] 97.5  F (36.4  C)  Pulse:  [62-87] 62  Resp:  [15-18] 16  BP: (106-136)/(50-70) 106/50  SpO2:  [95 %-96 %] 96 %  I/O last 3 completed shifts:  In: 2845 [I.V.:2845]  Out: -     Constitutional: Alert, cooperative, no apparent distress  Respiratory: Non labored breathing, clear to auscultation bilaterally, no crackles or wheezing  Cardiovascular: Regular rate and rhythm, no murmurs, no edema  GI: Normal bowel sounds, soft, non-distended, non-tender  Skin: No obvious rash  Neuro: Alert, engages in appropriate conversation, fluent speech, moving all extremities, no facial asymmetry  Psych: Calm and pleasant, no obvious anxiety/ depression      Discharge Disposition   Discharged to home  Condition at discharge: Stable    Consultations This Hospital Stay   SURGERY GENERAL IP CONSULT    Time  Spent on this Encounter   Zaina DURAN MD, personally saw the patient today and spent greater than 30 minutes discharging this patient.    Discharge Orders      Reason for your hospital stay    Gallstone Pancreatitis requiring Cholecystectomy     Follow-up and recommended labs and tests    Our office will contact you within 2 weeks to check on your progress and answer any questions you may have.  If you are doing well, you will not need to return for a follow up appointment.  If any concerns are identified over the phone, we may ask you to make an appointment to see a provider in our clinic.   If you have not received a phone call, have any questions or concerns, or would like to be seen, please call us at [phone] and ask to speak with our nurse.  We are located at [address] [place] [address].     Activity    Your activity upon discharge: activity as tolerated, ambulate in house, no driving while on analgesics and no heavy lifting for 2 weeks     Wound care and dressings    Instructions to care for your wound at home: may get incision wet in shower but do not soak or scrub.     May discharge when    Discharge to home when the following criteria have been met:    Stable vital signs    Oral temperature < 100 o F    Return to baseline mental status    No bleeding at incision site    Able to tolerate oral fluids    Minimal pain reported and/or controlled with oral analgesics    Minimal nausea    Ambulates with assistance appropriate to age and health status  AND Labs 5/12 are WNL or Improved     Diet    Follow this diet upon discharge: Advance to a regular diet as tolerated.  For first couple weeks, would be careful with high fats or fried foods as these may be intolerable.     Discharge Medications   Discharge Medication List as of 5/12/2021 11:29 AM      START taking these medications    Details   oxyCODONE (ROXICODONE) 5 MG tablet Take 1-2 tablets (5-10 mg) by mouth every 3 hours as  needed for moderate to severe pain, Disp-8 tablet, R-0, E-Prescribe      senna-docusate (SENOKOT-S/PERICOLACE) 8.6-50 MG tablet Take 1 tablet by mouth 2 times daily as needed for constipation, Disp-8 tablet, R-0, E-Prescribe         CONTINUE these medications which have NOT CHANGED    Details   calcium carbonate-vitamin D (OSCAL W/D) 500-200 MG-UNIT tablet Take 1 tablet by mouth daily, Historical      IRON PO Take 1 tablet by mouth daily , Historical      lisdexamfetamine (VYVANSE) 50 MG capsule Take 1 capsule (50 mg) by mouth daily, Disp-30 capsule, R-0, E-Prescribe      lisinopril (ZESTRIL) 20 MG tablet Take 1 tablet (20 mg) by mouth daily, Disp-30 tablet, R-0, E-Prescribe      omeprazole (PRILOSEC) 20 MG DR capsule Take 20 mg by mouth daily, Historical      !! UNABLE TO FIND Take by mouth daily MEDICATION NAME: Keratin , Historical      !! UNABLE TO FIND Take by mouth daily MEDICATION NAME: Rhodiola , Historical      !! UNABLE TO FIND Take by mouth daily MEDICATION NAME: Collagen. powder, Historical      vitamin D3 (CHOLECALCIFEROL) 50 mcg (2000 units) tablet Take 1 tablet by mouth daily, Historical       !! - Potential duplicate medications found. Please discuss with provider.        Allergies   No Known Allergies  Data   Most Recent 3 CBC's:  Recent Labs   Lab Test 05/12/21 0928 05/11/21  0813 05/10/21  1202   WBC 9.8 8.8 13.8*   HGB 10.0* 10.4* 13.5   MCV 89 89 87    214 253      Most Recent 3 BMP's:  Recent Labs   Lab Test 05/12/21 0928 05/11/21  0813 05/10/21  1202    139 140   POTASSIUM 3.5 4.1 3.8   CHLORIDE 109 108 108   CO2 28 26 29   BUN 8 12 16   CR 0.65 0.68 0.72   ANIONGAP 2* 5 3   CRYS 8.8 8.4* 9.3   GLC 82 98 133*     Most Recent 2 LFT's:  Recent Labs   Lab Test 05/12/21 0928 05/11/21  0813   AST 33 26   ALT 45 40   ALKPHOS 73 65   BILITOTAL 1.4* 1.4*     Most Recent INR's and Anticoagulation Dosing History:  Anticoagulation Dose History     Recent Dosing and Labs Latest Ref Rng &  Units 4/6/2009 6/29/2010    INR 0.86 - 1.14 0.96 1.01        Most Recent 3 Troponin's:  Recent Labs   Lab Test 05/10/21  1202   TROPI <0.015     Most Recent Cholesterol Panel:  Recent Labs   Lab Test 05/10/21  1202 11/11/20  0802   CHOL  --  168   LDL  --  88   HDL  --  48*   TRIG 106 160*     Most Recent 6 Bacteria Isolates From Any Culture (See EPIC Reports for Culture Details):  Recent Labs   Lab Test 05/29/19  1628 07/16/18  0740 03/22/16  1108   CULT No beta hemolytic Streptococcus Group A isolated <10,000 colonies/mL  mixed urogenital kaylin   <10,000 colonies/mL urogenital kaylin     Most Recent TSH, T4 and A1c Labs:  Recent Labs   Lab Test 11/11/20  0802 11/23/16  1357   TSH  --  0.60   T4  --  1.21   A1C 5.4 5.3       Results for orders placed or performed during the hospital encounter of 05/10/21   US Abdomen Limited    Narrative    ULTRASOUND ABDOMEN LIMITED May 10, 2021 12:45 PM    CLINICAL HISTORY: Right upper quadrant pain and vomiting.    TECHNIQUE: Limited abdominal ultrasound.    COMPARISON: None.    FINDINGS:    GALLBLADDER: Multiple shadowing gallstones. Negative sonographic  Lambert's sign. The gallbladder is mildly thickened at 3 mm.    BILE DUCTS: There is no biliary dilatation. The common duct measures 3  mm.    LIVER: Normal where seen.    RIGHT KIDNEY: No hydronephrosis.    PANCREAS: The visualized portions of the pancreas are normal.    No ascites.      Impression    IMPRESSION: Cholelithiasis. Negative sonographic Lambert's sign. No  biliary dilatation identified. There is mild gallbladder wall  thickening. Cholecystitis not excluded. HIDA scan could provide  further assessment if there is continued clinical concern.    KYLE DAILEY MD   XR Surgery GOPI L/T 5 Min Fluoro w Stills    Narrative    SURGERY C-ARM FLUOROSCOPY LESS THAN FIVE MINUTES WITH STILLS   5/11/2021 10:45 AM     HISTORY: Cholangiograms    COMPARISON: None.      Impression    IMPRESSION: Two spot fluoroscopic images obtained  during  cholecystectomy. Common bile duct is patent with contrast extending  into the duodenum. Portions of the pancreatic duct also visible. No  suggestion of intrahepatic biliary dilatation. Total fluoroscopic time  is 0.2 minutes.    BELKYS STEVENS MD

## 2021-05-12 NOTE — PROGRESS NOTES
General Surgery Note    Stable S/P Lap Lyric with IOC  POD1    -  Labs look ok.  Continue with plan below:  -ADAT and discharge to home today assuming Medicine approval  -Discussed surgery details and recovery expectations  -Follow up with post op call in 2 weeks, earlier if needed.  -Pt comfortable with plan and would like to discharge asap.    Doing very well.  Pain much better than pre-op.  No nausea and tolerating liquids.    NAD, very pleasant, A&O  /50 (BP Location: Right arm)   Pulse 62   Temp 97.5  F (36.4  C) (Oral)   Resp 16   Ht 1.524 m (5')   Wt 86.2 kg (190 lb)   SpO2 96%   BMI 37.11 kg/m      Intake/Output Summary (Last 24 hours) at 5/12/2021 0919  Last data filed at 5/11/2021 2100  Gross per 24 hour   Intake 2845 ml   Output --   Net 2845 ml     Abd: soft, approp tenderness mostly at epigastric region, ND  Inc: exofin glue, CDI    LFTs and lipase pending

## 2021-05-12 NOTE — DISCHARGE INSTRUCTIONS
[facility]  Discharge Instructions: Post-Operative Laparoscopic Cholecystectomy    ACTIVITY    Expect to feel tired after your surgery.  This will gradually resolve.      Take frequent, short walks and increase your activity gradually.      Avoid strenuous physical activity or heavy lifting greater than 15-20 lbs. for 2-3 weeks.  You may climb stairs.    You may drive without restrictions when you are not using any prescription pain medication and feel comfortable in a car.    You may return to work/school when you are comfortable without any prescription pain medication.    WOUND CARE    You may remove your outer dressing or Band-Aids and shower 48 hours after the surgery.  Pat your incisions dry and leave them open to air.  Re-apply dressing (Band-Aids or gauze/tape) as needed for comfort or drainage.    You may have steri-strips (looks like white tape) on your incision.  You may peel off the steri-strips 2 weeks after your surgery if they have not peeled off on their own.     Do not soak your incisions in a tub or pool for 2 weeks.     Do not apply any lotions, creams, or ointments to your incisions.    A ridge under your incisions is normal and will gradually resolve.    DIET    Start with liquids, then gradually resume your regular diet as tolerated.  Avoid heavy, spicy, and greasy meals for 2-3 days.    Drink plenty of fluids to stay hydrated.    It is not uncommon to experience some loose stools or diarrhea after surgery.  This is your body's way of adapting to the bile which will slowly drain into your intestine.  A low fat diet may help with this.  This should improve over 1-2 months.    PAIN    Expect some tenderness and discomfort at the incision sites.  Use the prescribed pain medication at your discretion.  Expect gradual resolution of your pain over several days.    You may take ibuprofen with food (unless you have been told not to) or acetaminophen/Tylenol instead of or  in addition to your prescribed pain medication.  If you are taking Norco or Percocet, do not take any additional acetaminophen/Tylenol.    Do not drink alcohol or drive while you are taking pain medications.    You may apply ice to your incisions in 20 minute intervals as needed for the next 48 hours.  After that time, consider switching to heat if you prefer.    EXPECTATIONS    Pain medications can cause constipation.  Limit use when possible.  Take over the counter stool softener/stimulant, such as Colace or Senna, 1-2 times a day with plenty of water.  You may take a mild over the counter laxative, such as Miralax or a suppository, as needed.  You may discontinue these medications once you are having regular bowel movements and/or are no longer taking your narcotic pain medication.      You may have shoulder or upper back discomfort due to the gas used in surgery.  This is temporary and should resolve in 48-72 hours.  Short, frequent walks may help with this.    If you are unable to urinate, or feel as though you are not emptying your bladder adequately, we recommend you call our office and/or seek care at an ER or Urgent Care facility if after hours.    FOLLOW UP    Our office will contact you in approximately 2-3 weeks to check on your progress and answer any questions you may have.  If you are doing well, you will not need to return for a follow up appointment.  If any concerns are identified over the phone, we will help you make an appointment to see a provider.     If you have not received a phone call, have any questions or concerns, or would like to be seen, please call us at [phone] and ask to speak with our nurse.  We are located at [address] [place] [address].    CALL OUR OFFICE [phone] IF YOU HAVE:     Chills or fever above 101 F.    Increased redness, warmth, or drainage at your incisions.    Significant bleeding.    Pain not relieved by your pain medication or  rest.    Increasing pain after the first 48 hours.    Any other concerns or questions.                      Revised September 2020

## 2021-05-12 NOTE — PLAN OF CARE
Summary:  Acute pancreatitis. POD # 1 fernando lomeli  DATE & TIME: 5/11/21 2165-2882  Cognitive Concerns/ Orientation : A&Ox4   BEHAVIOR & AGGRESSION TOOL COLOR: Green  CIWA SCORE: N/A   ABNL VS/O2: VSS on RA, tim at times 55-60bpm.   MOBILITY: SBA, ambulated in hallway this AM.  PAIN MANAGMENT: C/o upper abdominal and L shoulder pain 5mg Oxy given x1-effective.   DIET: Clears.   BOWEL/BLADDER: Continent, passing flatus  ABNL LAB/BG: Lipase 1,753  DRAIN/DEVICES: PIV infusing LR @150ml/hr  TELEMETRY RHYTHM: N/A  SKIN: 4 lap sites- liquid bandage closure-PJ. Pt reports face and hands feeling tight/swollen.   TESTS/PROCEDURES: fernando lomeli completed  D/C DAY/GOALS/PLACE: discharge today pending stable labs  OTHER IMPORTANT INFO: General surgery following.

## 2021-05-13 LAB — COPATH REPORT: NORMAL

## 2021-05-19 ENCOUNTER — TELEPHONE (OUTPATIENT)
Dept: SURGERY | Facility: CLINIC | Age: 66
End: 2021-05-19

## 2021-05-19 NOTE — TELEPHONE ENCOUNTER
Procedure:  Laparoscopic cholecystectomy with intra-operative cholangiograms    Date:  05/11/2021    Surgeon:  Henrique    Patient wondering about steri strips.  Should she be removing them?    Informed her that she should leave them in place for two weeks, unless they fall off before then.  If they are causing rxn, okay to remove.    She is in agreement.    Pathology reviewed.    Patient is still having pain, especially at large port site.  Informed her this is to be expected. She has been using 400 mg of advil. Recommend she increase to 600 mg of 6 - 8 hours. Can also add in Tylenol and heat.    She is in agreement and she will call PRN.    Ivett Arrieta RN-BSN

## 2021-05-19 NOTE — TELEPHONE ENCOUNTER
Patient had lap armani 5/11/1 MUSTAPHA, has questions abotu removing steri strips    Phone: [phone]    Message ok

## 2021-06-10 NOTE — PROGRESS NOTES
Ana Dupree is a 65 year old female who is being evaluated via a billable telephone visit.      What phone number would you like to be contacted at? [phone]  How would you like to obtain your AVS? MyChart    SUBJECTIVE:                                                   Ana Dupree is a 65 year old female who presents for virtual visit today for the following health issue(s):  Patient presents with:  Recheck Medication: Vyvanse 50mg. Patient feels great, weight loss is going well and states she feels more focused.     HPI:  Doing a phone visit f/u on her vyvanse. Switched to 50mg vyvanse at end of feb so just over 3 months on this dose and feeling great on it.  Focused and motivated, no binge eating and snacking/grazing. Thoughtful about food, pauses before making food choices  Eating more healthy foods, smaller portions, and working out consistently  No side effects at all. Wants to make sure it's ok to be on it long term b/c on phentermine for quite a while, though inconsistently before vyvanse and now on this for awhile    Only new thing since here last is had acute abdominal pain and by the time she drove herself to [facility] she couldn't stand up and needed a wheelchair to the ER  Had gallstones and pancreatitis with lipase at 29k. NPO and IVF and abx and then 2 days later had armani. At 2 weeks wasn't feeling great yet but then after that she started to feel ok until yesterday.  Had skipped one day of her prilosec b/c ran out of it. Had one day w/o a BM which normally she doesn't. Did have sushi the day before. Stomach felt bloated and hard  Got the generic omeprazole yest and took it and has had a BM now and is feeling ok today.    No LMP recorded. Patient is postmenopausal.    Patient is not sexually active, .  Using not sexually active for contraception.    reports that she has never smoked. She has never used smokeless tobacco.    Health maintenance updated:  no    Today's PHQ-2 Score:    PHQ-2 ( 1999 [employer]) 7/24/2019   Q1: Little interest or pleasure in doing things 0   Q2: Feeling down, depressed or hopeless 0   PHQ-2 Score 0     Today's PHQ-9 Score:   PHQ-9 SCORE 10/9/2020   PHQ-9 Total Score 5     Today's SAMIR-7 Score:   SAMIR-7 SCORE 10/9/2020   Total Score 4       Problem list and histories reviewed & adjusted, as indicated.  Additional history: as documented.    Patient Active Problem List   Diagnosis     Essential hypertension     Osteoporosis, senile     Morbid obesity (H)     Acute gallstone pancreatitis     Past Surgical History:   Procedure Laterality Date     BIOPSY BREAST  11/2001, 07/07/2014    excisional left side-B9 fibrocystic change. stereotactic bx left at 2 oclock for calcs. path-atypical ductal proliferation     COLONOSCOPY N/A 1/19/2017    Procedure: COMBINED COLONOSCOPY, SINGLE OR MULTIPLE BIOPSY/POLYPECTOMY BY BIOPSY;  Surgeon: Jerald Grant MD;  Location:  GI     LAPAROSCOPIC CHOLECYSTECTOMY WITH CHOLANGIOGRAMS N/A 5/11/2021    Procedure: CHOLECYSTECTOMY, LAPAROSCOPIC, WITH CHOLANGIOGRAM;  Surgeon: Gretchen Duenas MD;  Location:  OR     LITHOTRIPSY  2007     MAMMOPLASTY REDUCTION  11/07/2014    Dr. Trujillo and Reji. had atypical cells noted on a bx but also wanted reduction so done together.     PARATHYROIDECTOMY Right 8/21/2015    Procedure: PARATHYROIDECTOMY;  Surgeon: Lynnette Sewell MD;  Location:  OR     TUBAL LIGATION  12/14/1994    post partum      Social History     Tobacco Use     Smoking status: Never Smoker     Smokeless tobacco: Never Used   Substance Use Topics     Alcohol use: Yes     Frequency: Monthly or less     Drinks per session: 1 or 2     Binge frequency: Never      Problem (# of Occurrences) Relation (Name,Age of Onset)    Breast Cancer (1) Maternal Grandmother    Cerebrovascular Disease (2) Mother, Paternal Grandmother    Colon Cancer (1) Father (90)    Coronary Artery Disease (1) Mother    Diabetes (1) Mother    Hyperlipidemia (1)  Mother    Hypertension (1) Mother    No Known Problems (4) Sister, Brother, Maternal Grandfather, Other    Other Cancer (1) Maternal Grandmother            Current Outpatient Medications   Medication Sig     calcium carbonate-vitamin D (OSCAL W/D) 500-200 MG-UNIT tablet Take 1 tablet by mouth daily     IRON PO Take 1 tablet by mouth daily      lisdexamfetamine (VYVANSE) 50 MG capsule Take 1 capsule (50 mg) by mouth every morning     lisinopril (ZESTRIL) 20 MG tablet Take 1 tablet (20 mg) by mouth daily     omeprazole (PRILOSEC) 20 MG DR capsule Take 20 mg by mouth daily     senna-docusate (SENOKOT-S/PERICOLACE) 8.6-50 MG tablet Take 1 tablet by mouth 2 times daily as needed for constipation     UNABLE TO FIND Take by mouth daily MEDICATION NAME: Keratin      UNABLE TO FIND Take by mouth daily MEDICATION NAME: Rhodiola      UNABLE TO FIND Take by mouth daily MEDICATION NAME: Collagen. powder     vitamin D3 (CHOLECALCIFEROL) 50 mcg (2000 units) tablet Take 1 tablet by mouth daily     VYVANSE 50 MG capsule      No current facility-administered medications for this visit.      No Known Allergies      OBJECTIVE:     No vitals were obtained today due to virtual visit.      PSYCH: Mentation appears normal, affect normal/bright, judgement and insight intact, normal speech     ASSESSMENT/PLAN:                                                      Phone call duration: 23 minutes      ICD-10-CM    1. Binge eating disorder  F50.81 lisdexamfetamine (VYVANSE) 50 MG capsule   2. High risk medication use  Z79.899 lisdexamfetamine (VYVANSE) 50 MG capsule   3. BMI 38.0-38.9,adult  Z68.38 lisdexamfetamine (VYVANSE) 50 MG capsule         Patient is overall doing great on her vyvanse at 50mg for just over 3 months  Her home weight is 181# which would be typically around 184# on our scale and she was 190# on our scale one month ago  No side effects. All BPs normal in the ER despite acute pain and pancreatitis  Hoping with continued weight  loss could even d/c her lisinopril. Discussed pros/cons of this  Do recommend she find a PCP now that she is almost 66 with HTN and can still see me for vyvanse and gyn care    Reviewed her surgery and hospitalization with her and the labs and possible correlation to high CRP. Her lipase did normalize  Her recent abdominal pain that was similar to her pancreatitis was more likely gas from constipation and from some gastritis having skipped her prilosec  Encouraged good hydration despite vyvanse making her not think to hydrate as much and continued exercise, etc    [facility] since they do fill 3 months at a time on her lisinopril. If can't get 3 months at once, [facility]  Regardless in 3 months will do another phone visit to see how she's doing and then another 1-2 months of refill and then will be due for annual in October when can decide on next steps.    Reviewed overall pros/cons and safety of the med in the long and short term.    Oliva Lovell MD  [facility] [place]

## 2021-06-11 ENCOUNTER — VIRTUAL VISIT (OUTPATIENT)
Dept: OBGYN | Facility: CLINIC | Age: 66
End: 2021-06-11
Payer: COMMERCIAL

## 2021-06-11 VITALS — BODY MASS INDEX: 35.53 KG/M2 | WEIGHT: 181 LBS | HEIGHT: 60 IN

## 2021-06-11 DIAGNOSIS — F50.819 BINGE EATING DISORDER: Primary | ICD-10-CM

## 2021-06-11 DIAGNOSIS — Z79.899 HIGH RISK MEDICATION USE: ICD-10-CM

## 2021-06-11 PROCEDURE — 99213 OFFICE O/P EST LOW 20 MIN: CPT | Mod: TEL | Performed by: OBSTETRICS & GYNECOLOGY

## 2021-06-11 RX ORDER — LISDEXAMFETAMINE DIMESYLATE 50 MG
CAPSULE ORAL
COMMUNITY
Start: 2021-05-19 | End: 2021-06-19

## 2021-06-11 RX ORDER — LISDEXAMFETAMINE DIMESYLATE 50 MG/1
50 CAPSULE ORAL EVERY MORNING
Qty: 90 CAPSULE | Refills: 0 | Status: ON HOLD | OUTPATIENT
Start: 2021-06-11 | End: 2021-06-24

## 2021-06-11 ASSESSMENT — MIFFLIN-ST. JEOR: SCORE: 1292.51

## 2021-06-19 ENCOUNTER — HOSPITAL ENCOUNTER (INPATIENT)
Facility: CLINIC | Age: 66
LOS: 5 days | Discharge: HOME | DRG: 374 | End: 2021-06-24
Attending: EMERGENCY MEDICINE | Admitting: HOSPITALIST
Payer: COMMERCIAL

## 2021-06-19 ENCOUNTER — APPOINTMENT (OUTPATIENT)
Dept: CT IMAGING | Facility: CLINIC | Age: 66
DRG: 374 | End: 2021-06-19
Attending: EMERGENCY MEDICINE
Payer: COMMERCIAL

## 2021-06-19 DIAGNOSIS — R17 JAUNDICE: ICD-10-CM

## 2021-06-19 DIAGNOSIS — F50.819 BINGE EATING DISORDER: ICD-10-CM

## 2021-06-19 DIAGNOSIS — Z79.899 HIGH RISK MEDICATION USE: ICD-10-CM

## 2021-06-19 DIAGNOSIS — K83.1 BILIARY OBSTRUCTION (H): ICD-10-CM

## 2021-06-19 DIAGNOSIS — I10 ESSENTIAL HYPERTENSION: ICD-10-CM

## 2021-06-19 LAB
ALBUMIN SERPL-MCNC: 3 G/DL (ref 3.4–5)
ALBUMIN UR-MCNC: NEGATIVE MG/DL
ALP SERPL-CCNC: 309 U/L (ref 40–150)
ALT SERPL W P-5'-P-CCNC: 395 U/L (ref 0–50)
AMMONIA PLAS-SCNC: <10 UMOL/L (ref 10–50)
ANION GAP SERPL CALCULATED.3IONS-SCNC: 6 MMOL/L (ref 3–14)
APPEARANCE UR: CLEAR
APTT PPP: 34 SEC (ref 22–37)
AST SERPL W P-5'-P-CCNC: 179 U/L (ref 0–45)
BACTERIA #/AREA URNS HPF: ABNORMAL /HPF
BASOPHILS # BLD AUTO: 0 10E9/L (ref 0–0.2)
BASOPHILS NFR BLD AUTO: 0.6 %
BILIRUB DIRECT SERPL-MCNC: 7 MG/DL (ref 0–0.2)
BILIRUB SERPL-MCNC: 8.8 MG/DL (ref 0.2–1.3)
BILIRUB UR QL STRIP: ABNORMAL
BUN SERPL-MCNC: 12 MG/DL (ref 7–30)
CALCIUM SERPL-MCNC: 9.1 MG/DL (ref 8.5–10.1)
CHLORIDE SERPL-SCNC: 107 MMOL/L (ref 94–109)
CO2 SERPL-SCNC: 24 MMOL/L (ref 20–32)
COLOR UR AUTO: YELLOW
CREAT SERPL-MCNC: 0.67 MG/DL (ref 0.52–1.04)
DIFFERENTIAL METHOD BLD: ABNORMAL
EOSINOPHIL # BLD AUTO: 0.1 10E9/L (ref 0–0.7)
EOSINOPHIL NFR BLD AUTO: 1 %
ERYTHROCYTE [DISTWIDTH] IN BLOOD BY AUTOMATED COUNT: 15.9 % (ref 10–15)
GFR SERPL CREATININE-BSD FRML MDRD: >90 ML/MIN/{1.73_M2}
GLUCOSE SERPL-MCNC: 118 MG/DL (ref 70–99)
GLUCOSE UR STRIP-MCNC: NEGATIVE MG/DL
HCT VFR BLD AUTO: 32.9 % (ref 35–47)
HGB BLD-MCNC: 10.6 G/DL (ref 11.7–15.7)
HGB UR QL STRIP: ABNORMAL
IMM GRANULOCYTES # BLD: 0 10E9/L (ref 0–0.4)
IMM GRANULOCYTES NFR BLD: 0.4 %
INR PPP: 1 (ref 0.86–1.14)
KETONES UR STRIP-MCNC: NEGATIVE MG/DL
LABORATORY COMMENT REPORT: NORMAL
LEUKOCYTE ESTERASE UR QL STRIP: ABNORMAL
LIPASE SERPL-CCNC: 329 U/L (ref 73–393)
LYMPHOCYTES # BLD AUTO: 1.1 10E9/L (ref 0.8–5.3)
LYMPHOCYTES NFR BLD AUTO: 21.3 %
MCH RBC QN AUTO: 28.2 PG (ref 26.5–33)
MCHC RBC AUTO-ENTMCNC: 32.2 G/DL (ref 31.5–36.5)
MCV RBC AUTO: 88 FL (ref 78–100)
MONOCYTES # BLD AUTO: 0.4 10E9/L (ref 0–1.3)
MONOCYTES NFR BLD AUTO: 7.6 %
MUCOUS THREADS #/AREA URNS LPF: PRESENT /LPF
NEUTROPHILS # BLD AUTO: 3.5 10E9/L (ref 1.6–8.3)
NEUTROPHILS NFR BLD AUTO: 69.1 %
NITRATE UR QL: NEGATIVE
NRBC # BLD AUTO: 0 10*3/UL
NRBC BLD AUTO-RTO: 0 /100
PH UR STRIP: 6 PH (ref 5–7)
PLATELET # BLD AUTO: 256 10E9/L (ref 150–450)
POTASSIUM SERPL-SCNC: 3.8 MMOL/L (ref 3.4–5.3)
POTASSIUM SERPL-SCNC: 3.9 MMOL/L (ref 3.4–5.3)
PROT SERPL-MCNC: 7 G/DL (ref 6.8–8.8)
RBC # BLD AUTO: 3.76 10E12/L (ref 3.8–5.2)
RBC #/AREA URNS AUTO: 9 /HPF (ref 0–2)
SARS-COV-2 RNA RESP QL NAA+PROBE: NEGATIVE
SODIUM SERPL-SCNC: 137 MMOL/L (ref 133–144)
SOURCE: ABNORMAL
SP GR UR STRIP: 1.01 (ref 1–1.03)
SPECIMEN SOURCE: NORMAL
SQUAMOUS #/AREA URNS AUTO: 4 /HPF (ref 0–1)
UROBILINOGEN UR STRIP-MCNC: 0 MG/DL (ref 0–2)
WBC # BLD AUTO: 5.1 10E9/L (ref 4–11)
WBC #/AREA URNS AUTO: 41 /HPF (ref 0–5)

## 2021-06-19 PROCEDURE — 120N000001 HC R&B MED SURG/OB

## 2021-06-19 PROCEDURE — 250N000013 HC RX MED GY IP 250 OP 250 PS 637: Performed by: HOSPITALIST

## 2021-06-19 PROCEDURE — 74177 CT ABD & PELVIS W/CONTRAST: CPT

## 2021-06-19 PROCEDURE — 84132 ASSAY OF SERUM POTASSIUM: CPT | Performed by: HOSPITALIST

## 2021-06-19 PROCEDURE — 96375 TX/PRO/DX INJ NEW DRUG ADDON: CPT

## 2021-06-19 PROCEDURE — 250N000009 HC RX 250: Performed by: EMERGENCY MEDICINE

## 2021-06-19 PROCEDURE — 85610 PROTHROMBIN TIME: CPT | Performed by: EMERGENCY MEDICINE

## 2021-06-19 PROCEDURE — 85025 COMPLETE CBC W/AUTO DIFF WBC: CPT | Performed by: EMERGENCY MEDICINE

## 2021-06-19 PROCEDURE — 82140 ASSAY OF AMMONIA: CPT | Performed by: EMERGENCY MEDICINE

## 2021-06-19 PROCEDURE — 87086 URINE CULTURE/COLONY COUNT: CPT | Performed by: EMERGENCY MEDICINE

## 2021-06-19 PROCEDURE — 36415 COLL VENOUS BLD VENIPUNCTURE: CPT | Performed by: HOSPITALIST

## 2021-06-19 PROCEDURE — 81001 URINALYSIS AUTO W/SCOPE: CPT | Performed by: EMERGENCY MEDICINE

## 2021-06-19 PROCEDURE — 250N000013 HC RX MED GY IP 250 OP 250 PS 637: Performed by: EMERGENCY MEDICINE

## 2021-06-19 PROCEDURE — 250N000011 HC RX IP 250 OP 636: Performed by: EMERGENCY MEDICINE

## 2021-06-19 PROCEDURE — 85730 THROMBOPLASTIN TIME PARTIAL: CPT | Performed by: EMERGENCY MEDICINE

## 2021-06-19 PROCEDURE — 87635 SARS-COV-2 COVID-19 AMP PRB: CPT | Performed by: EMERGENCY MEDICINE

## 2021-06-19 PROCEDURE — 82248 BILIRUBIN DIRECT: CPT | Performed by: EMERGENCY MEDICINE

## 2021-06-19 PROCEDURE — 99285 EMERGENCY DEPT VISIT HI MDM: CPT | Mod: 25

## 2021-06-19 PROCEDURE — 99223 1ST HOSP IP/OBS HIGH 75: CPT | Mod: AI | Performed by: HOSPITALIST

## 2021-06-19 PROCEDURE — 96376 TX/PRO/DX INJ SAME DRUG ADON: CPT

## 2021-06-19 PROCEDURE — 258N000003 HC RX IP 258 OP 636: Performed by: HOSPITALIST

## 2021-06-19 PROCEDURE — 80053 COMPREHEN METABOLIC PANEL: CPT | Performed by: EMERGENCY MEDICINE

## 2021-06-19 PROCEDURE — 83690 ASSAY OF LIPASE: CPT | Performed by: EMERGENCY MEDICINE

## 2021-06-19 PROCEDURE — 96374 THER/PROPH/DIAG INJ IV PUSH: CPT

## 2021-06-19 RX ORDER — NALOXONE HYDROCHLORIDE 0.4 MG/ML
0.2 INJECTION, SOLUTION INTRAMUSCULAR; INTRAVENOUS; SUBCUTANEOUS
Status: DISCONTINUED | OUTPATIENT
Start: 2021-06-19 | End: 2021-06-24 | Stop reason: HOSPADM

## 2021-06-19 RX ORDER — PROCHLORPERAZINE MALEATE 5 MG
10 TABLET ORAL EVERY 6 HOURS PRN
Status: DISCONTINUED | OUTPATIENT
Start: 2021-06-19 | End: 2021-06-24 | Stop reason: HOSPADM

## 2021-06-19 RX ORDER — IOPAMIDOL 755 MG/ML
91 INJECTION, SOLUTION INTRAVASCULAR ONCE
Status: COMPLETED | OUTPATIENT
Start: 2021-06-19 | End: 2021-06-19

## 2021-06-19 RX ORDER — BISACODYL 10 MG
10 SUPPOSITORY, RECTAL RECTAL DAILY PRN
Status: DISCONTINUED | OUTPATIENT
Start: 2021-06-19 | End: 2021-06-24 | Stop reason: HOSPADM

## 2021-06-19 RX ORDER — ONDANSETRON 2 MG/ML
4 INJECTION INTRAMUSCULAR; INTRAVENOUS EVERY 6 HOURS PRN
Status: DISCONTINUED | OUTPATIENT
Start: 2021-06-19 | End: 2021-06-24 | Stop reason: HOSPADM

## 2021-06-19 RX ORDER — AMOXICILLIN 250 MG
2 CAPSULE ORAL 2 TIMES DAILY PRN
Status: DISCONTINUED | OUTPATIENT
Start: 2021-06-19 | End: 2021-06-24 | Stop reason: HOSPADM

## 2021-06-19 RX ORDER — MORPHINE SULFATE 4 MG/ML
4 INJECTION, SOLUTION INTRAMUSCULAR; INTRAVENOUS
Status: DISCONTINUED | OUTPATIENT
Start: 2021-06-19 | End: 2021-06-19

## 2021-06-19 RX ORDER — DIPHENHYDRAMINE HCL 25 MG
50 TABLET ORAL
COMMUNITY
End: 2022-01-19

## 2021-06-19 RX ORDER — LISINOPRIL 20 MG/1
20 TABLET ORAL DAILY
Status: DISCONTINUED | OUTPATIENT
Start: 2021-06-20 | End: 2021-06-20

## 2021-06-19 RX ORDER — NALOXONE HYDROCHLORIDE 0.4 MG/ML
0.4 INJECTION, SOLUTION INTRAMUSCULAR; INTRAVENOUS; SUBCUTANEOUS
Status: DISCONTINUED | OUTPATIENT
Start: 2021-06-19 | End: 2021-06-24 | Stop reason: HOSPADM

## 2021-06-19 RX ORDER — AMOXICILLIN 250 MG
1 CAPSULE ORAL 2 TIMES DAILY PRN
Status: DISCONTINUED | OUTPATIENT
Start: 2021-06-19 | End: 2021-06-24 | Stop reason: HOSPADM

## 2021-06-19 RX ORDER — PROCHLORPERAZINE 25 MG
25 SUPPOSITORY, RECTAL RECTAL EVERY 12 HOURS PRN
Status: DISCONTINUED | OUTPATIENT
Start: 2021-06-19 | End: 2021-06-24 | Stop reason: HOSPADM

## 2021-06-19 RX ORDER — POLYETHYLENE GLYCOL 3350 17 G/17G
17 POWDER, FOR SOLUTION ORAL DAILY PRN
Status: DISCONTINUED | OUTPATIENT
Start: 2021-06-19 | End: 2021-06-24 | Stop reason: HOSPADM

## 2021-06-19 RX ORDER — HYDROXYZINE HYDROCHLORIDE 25 MG/1
25 TABLET, FILM COATED ORAL ONCE
Status: COMPLETED | OUTPATIENT
Start: 2021-06-19 | End: 2021-06-19

## 2021-06-19 RX ORDER — SODIUM CHLORIDE 9 MG/ML
INJECTION, SOLUTION INTRAVENOUS CONTINUOUS
Status: DISCONTINUED | OUTPATIENT
Start: 2021-06-19 | End: 2021-06-22

## 2021-06-19 RX ORDER — OXYCODONE HYDROCHLORIDE 5 MG/1
5 TABLET ORAL EVERY 4 HOURS PRN
Status: DISCONTINUED | OUTPATIENT
Start: 2021-06-19 | End: 2021-06-24 | Stop reason: HOSPADM

## 2021-06-19 RX ORDER — LIDOCAINE 40 MG/G
CREAM TOPICAL
Status: DISCONTINUED | OUTPATIENT
Start: 2021-06-19 | End: 2021-06-24 | Stop reason: HOSPADM

## 2021-06-19 RX ORDER — ONDANSETRON 4 MG/1
4 TABLET, ORALLY DISINTEGRATING ORAL EVERY 6 HOURS PRN
Status: DISCONTINUED | OUTPATIENT
Start: 2021-06-19 | End: 2021-06-24 | Stop reason: HOSPADM

## 2021-06-19 RX ORDER — ONDANSETRON 2 MG/ML
4 INJECTION INTRAMUSCULAR; INTRAVENOUS EVERY 30 MIN PRN
Status: DISCONTINUED | OUTPATIENT
Start: 2021-06-19 | End: 2021-06-19

## 2021-06-19 RX ORDER — DIPHENHYDRAMINE HCL 25 MG
25-50 CAPSULE ORAL EVERY 6 HOURS PRN
Status: DISCONTINUED | OUTPATIENT
Start: 2021-06-19 | End: 2021-06-24 | Stop reason: HOSPADM

## 2021-06-19 RX ADMIN — ONDANSETRON 4 MG: 2 INJECTION INTRAMUSCULAR; INTRAVENOUS at 16:14

## 2021-06-19 RX ADMIN — OXYCODONE HYDROCHLORIDE 5 MG: 5 TABLET ORAL at 22:06

## 2021-06-19 RX ADMIN — SODIUM CHLORIDE 66 ML: 9 INJECTION, SOLUTION INTRAVENOUS at 13:32

## 2021-06-19 RX ADMIN — MORPHINE SULFATE 4 MG: 4 INJECTION, SOLUTION INTRAMUSCULAR; INTRAVENOUS at 16:14

## 2021-06-19 RX ADMIN — OXYCODONE HYDROCHLORIDE 5 MG: 5 TABLET ORAL at 18:05

## 2021-06-19 RX ADMIN — SODIUM CHLORIDE: 9 INJECTION, SOLUTION INTRAVENOUS at 18:04

## 2021-06-19 RX ADMIN — HYDROXYZINE HYDROCHLORIDE 25 MG: 25 TABLET ORAL at 13:42

## 2021-06-19 RX ADMIN — DIPHENHYDRAMINE HYDROCHLORIDE 25 MG: 25 CAPSULE ORAL at 18:05

## 2021-06-19 RX ADMIN — ONDANSETRON 4 MG: 2 INJECTION INTRAMUSCULAR; INTRAVENOUS at 13:38

## 2021-06-19 RX ADMIN — IOPAMIDOL 91 ML: 755 INJECTION, SOLUTION INTRAVENOUS at 13:32

## 2021-06-19 RX ADMIN — DIPHENHYDRAMINE HYDROCHLORIDE 25 MG: 25 CAPSULE ORAL at 20:41

## 2021-06-19 RX ADMIN — MORPHINE SULFATE 4 MG: 4 INJECTION, SOLUTION INTRAMUSCULAR; INTRAVENOUS at 13:39

## 2021-06-19 ASSESSMENT — ENCOUNTER SYMPTOMS
VOMITING: 0
FEVER: 0
ROS SKIN COMMENTS: JAUNDICE
SHORTNESS OF BREATH: 0
NAUSEA: 0
COUGH: 0

## 2021-06-19 ASSESSMENT — ACTIVITIES OF DAILY LIVING (ADL): ADLS_ACUITY_SCORE: 14

## 2021-06-19 ASSESSMENT — MIFFLIN-ST. JEOR: SCORE: 1287.97

## 2021-06-19 NOTE — ED TRIAGE NOTES
"Lyric on 5/11. Has had different issues since then including abdominal pain. Started itching \"all over body\" this past week. Today noticed yellow sclera and dark urine.  "

## 2021-06-19 NOTE — H&P
[facility]    History and Physical - Hospitalist Service       Date of Admission:  6/19/2021    Assessment & Plan   Ana Dupree is a 65 year old female admitted on 6/19/2021.  Past history of HTN, GERD, KINGS, obesity with recent hospitalization 5/10-5/12/21 for gallstone pancreatitis and possible cholecystitis s/p lap armani with intra-op cholangiogram on 5/11 who presents with persisting epigastric and post-prandial pain and jaundice.      Probable choledocholithiasis  Hx gallstone pancreatitis and possible cholecystitis s/p lap armani with intra-op cholangiogram on 5/11   Persisting epigastric, post-prandial pain and distension following discharge, now with recent onset jaundice/icterus.  Admission LFT's consistent with obstruction, lipase wnl.  CT abd/pelvis showing intra- and extra-hepatic biliary ductal dilatation with CBD measuring 1.5cm with no identifiable cause for obstruction.  - NPO midnight for possible ERCP; consult to Dr. Grant  -  ml/hr  - prn benadryl for itching  - prn oxycodone  - LFT's in AM  - monitor closely for fever with low threshold for antibiotics; currently afebrile without leukocytosis    Left nephrolithiasis with mild left hydronephrosis  Incidental finding on CT.  UA mildly abnormal but denies any urinary symptoms.  Cr at baseline.   - follow up with Urology outpatient (discussed with patient)    HTN  - continue PTA lisinopril    GERD  - continue PTA PPI    Morbid obesity  - hold PTA Vyvanse         Diet:   regular, NPO midnight  DVT Prophylaxis: Pneumatic Compression Devices  Chavez Catheter: not present  Code Status:   Full code          Disposition Plan   Expected discharge: 2 - 3 days, recommended to prior living arrangement once work-up and any intervention complete.  Entered: Jack Fan MD 06/19/2021, 3:04 PM     The patient's care was discussed with the Patient.    Jack Fan MD  [facility]  Contact information  "available via [facility] Paging/Directory      ______________________________________________________________________    Chief Complaint   Abdominal pain     History is obtained from the patient,m chart review and discussion with ED provider     History of Present Illness   Ana Dupree is a 65 year old female who presents with abdominal pain.  She was hospitalized approximately 1 month ago with gallstone pancreatitis and possible cholecystitis and underwent laparoscopic cholecystectomy with Intra-Op cholangiogram on 5/11/2021.  She discharged home, but reports persisting epigastric pain and distention.  She reports intermittent worsening of symptoms but overall states that they have been fairly constant since discharge.  She described this as a \"constant stomachache\".  She also reports worsening with food.  She has been taking ibuprofen and trying to stay active to help her pain.  She denies any fevers or chills, nausea or vomiting, lightheadedness.  Her daughter noted that her eyes were yellow today and therefore she presented to the emergency room for evaluation.  Her remainder of review of systems otherwise negative.    Review of Systems    The 10 point Review of Systems is negative other than noted in the HPI or here.     Past Medical History    I have reviewed this patient's medical history and updated it with pertinent information if needed.   Past Medical History:   Diagnosis Date     Essential hypertension 11/25/2016     Hyperlipidemia     elevated triglycerides     Hyperparathyroidism (H) 06/2014    referred to endo when PTH/calcium still high 11/14 and 12/14. seeing Dr. leos. dexa wtih penia at hips and porosis wrist. disc. surgery 12/14     Hypertension      Morbid obesity (H)      Osteoporosis     osteopenia at hips and osteoporosis at wrist according to dexa done through endo referral     Osteoporosis, senile 11/25/2016     Renal stones      Situational depression      Sleep apnea 2009    Doesn't use " a CPAP       Past Surgical History   I have reviewed this patient's surgical history and updated it with pertinent information if needed.  Past Surgical History:   Procedure Laterality Date     BIOPSY BREAST  11/2001, 07/07/2014    excisional left side-B9 fibrocystic change. stereotactic bx left at 2 oclock for calcs. path-atypical ductal proliferation     COLONOSCOPY N/A 1/19/2017    Procedure: COMBINED COLONOSCOPY, SINGLE OR MULTIPLE BIOPSY/POLYPECTOMY BY BIOPSY;  Surgeon: Jerald Grant MD;  Location:  GI     LAPAROSCOPIC CHOLECYSTECTOMY WITH CHOLANGIOGRAMS N/A 5/11/2021    Procedure: CHOLECYSTECTOMY, LAPAROSCOPIC, WITH CHOLANGIOGRAM;  Surgeon: Gretchen Duenas MD;  Location:  OR     LITHOTRIPSY  2007     MAMMOPLASTY REDUCTION  11/07/2014    Dr. Trujillo and Reji. had atypical cells noted on a bx but also wanted reduction so done together.     PARATHYROIDECTOMY Right 8/21/2015    Procedure: PARATHYROIDECTOMY;  Surgeon: Lynnette Sewell MD;  Location:  OR     TUBAL LIGATION  12/14/1994    post partum       Social History   I have reviewed this patient's social history and updated it with pertinent information if needed.  Social History     Tobacco Use     Smoking status: Never Smoker     Smokeless tobacco: Never Used   Substance Use Topics     Alcohol use: Yes     Frequency: Monthly or less     Drinks per session: 1 or 2     Binge frequency: Never     Drug use: No       Family History   I have reviewed this patient's family history and updated it with pertinent information if needed.  Family History   Problem Relation Age of Onset     Hypertension Mother      Cerebrovascular Disease Mother      Diabetes Mother      Hyperlipidemia Mother      Coronary Artery Disease Mother      Colon Cancer Father 90     Other Cancer Maternal Grandmother      Breast Cancer Maternal Grandmother      Cerebrovascular Disease Paternal Grandmother      No Known Problems Sister      No Known Problems Brother      No Known  Problems Maternal Grandfather      No Known Problems Other        Prior to Admission Medications   Prior to Admission Medications   Prescriptions Last Dose Informant Patient Reported? Taking?   CALCIUM PO 6/19/2021 at Unknown time Self Yes Yes   Sig: Take 1 tablet by mouth daily   IRON PO 6/19/2021 at Unknown time Self Yes Yes   Sig: Take 1 tablet by mouth daily    diphenhydrAMINE (BENADRYL) 25 MG tablet 6/18/2021 at Unknown time Self Yes Yes   Sig: Take 50 mg by mouth nightly as needed for itching or allergies   lisdexamfetamine (VYVANSE) 50 MG capsule 6/19/2021 at Unknown time Self No Yes   Sig: Take 1 capsule (50 mg) by mouth every morning   lisinopril (ZESTRIL) 20 MG tablet 6/19/2021 at Unknown time Self No Yes   Sig: Take 1 tablet (20 mg) by mouth daily   omeprazole (PRILOSEC) 20 MG DR capsule 6/19/2021 at Unknown time Self Yes Yes   Sig: Take 20 mg by mouth daily   vitamin D3 (CHOLECALCIFEROL) 50 mcg (2000 units) tablet 6/19/2021 at Unknown time Self Yes Yes   Sig: Take 1 tablet by mouth daily      Facility-Administered Medications: None     Allergies   No Known Allergies    Physical Exam   Vital Signs: Temp: 98.4  F (36.9  C) Temp src: Oral BP: (!) 144/86 Pulse: 73   Resp: 16 SpO2: 100 % O2 Device: None (Room air)    Weight: 180 lbs 0 oz    General Appearance: Well-nourished female no acute distress  Eyes: Pupils equal, round reactive to light, scleral icterus present  HEENT: Mucous membranes moist, neck lymphadenopathy  Respiratory: Lungs clear to auscultation bilaterally, no wheeze or crackles, tachypnea  Cardiovascular: Regular in rhythm, normal S1/S2, no murmurs, rubs or gallops  GI: Abdomen soft, reports epigastric tenderness and right upper quadrant tenderness palpation, mildly distended, normal bowel sounds  Lymph/Hematologic: No peripheral edema  Musculoskeletal: Extremities warm and well-perfused  Neurologic: Alert and appropriate, cranial nerves grossly intact  Psychiatric: Normal affect    Data    Data reviewed today: I reviewed all medications, new labs and imaging results over the last 24 hours. I personally reviewed no images or EKG's today.    Recent Labs   Lab 06/19/21  1205   WBC 5.1   HGB 10.6*   MCV 88      INR 1.00      POTASSIUM 3.8   CHLORIDE 107   CO2 24   BUN 12   CR 0.67   ANIONGAP 6   CRYS 9.1   *   ALBUMIN 3.0*   PROTTOTAL 7.0   BILITOTAL 8.8*   ALKPHOS 309*   *   *   LIPASE 329     Recent Results (from the past 24 hour(s))   CT Abdomen Pelvis w Contrast    Narrative    CT ABDOMEN AND PELVIS WITH CONTRAST 6/19/2021 1:35 PM    CLINICAL HISTORY: Abdominal pain, biliary obstruction suspected  (Pediatric 0-18 years).    TECHNIQUE: CT scan of the abdomen and pelvis was performed following  injection of IV contrast. Multiplanar reformats were obtained. Dose  reduction techniques were used.  CONTRAST: 91mL Isovue-370    COMPARISON: 5/11/2021.    FINDINGS:   LOWER CHEST: Normal.    HEPATOBILIARY: There is intra and extrahepatic biliary ductal dilation  with the common hepatic duct measuring up to 1.5 cm. This appears new  compared to intraoperative cholangiogram dated 5/11/2021. No focal  liver lesions. Gallbladder is absent.    PANCREAS: Normal.    SPLEEN: Normal.    ADRENAL GLANDS: Normal.    KIDNEYS/BLADDER: Nonobstructing left nephrolithiasis with stones  measuring up to 9 mm. There is left hydronephrosis, but no  hydroureter. No ureteropelvic junction stone or mass demonstrated.  Right kidney appears normal.    BOWEL: There are a few colonic diverticuli, but no evidence of acute  diverticulitis. No bowel obstruction or inflammatory change.    PELVIC ORGANS: Normal.    ADDITIONAL FINDINGS: None.    MUSCULOSKELETAL: Unremarkable.      Impression    IMPRESSION:   1.  Intra and extrahepatic biliary ductal dilation extends to the  ampulla. No definite cause for obstruction is visualized, although CT  is relatively insensitive for choledocholithiasis. Consider  MRCP.  2.  Mild left hydronephrosis with left nephrolithiasis. This may  represent chronic partial UPJ obstruction.    PATRICIO GRIFFITH MD

## 2021-06-19 NOTE — PHARMACY-ADMISSION MEDICATION HISTORY
Pharmacy Medication History  Admission medication history interview status for the 6/19/2021  admission is complete. See EPIC admission navigator for prior to admission medications     Location of Interview: Patient room  Medication history sources: Patient    Significant changes made to the medication list:  Removed calcium with VitaminD, and senna S  Added calcium (unknown strength), added frequency for Iron(unknown strength), and   diphenhydramine     In the past week, patient estimated taking medication this percent of the time: greater than 90%    Additional medication history information:   Patient is a moderate historian. Updated med list per her report.     Medication reconciliation completed by provider prior to medication history? No    Time spent in this activity: 10 minutes     Prior to Admission medications    Medication Sig Last Dose Taking? Auth Provider   CALCIUM PO Take 1 tablet by mouth daily 6/19/2021 at Unknown time Yes Unknown, Entered By History   diphenhydrAMINE (BENADRYL) 25 MG tablet Take 50 mg by mouth nightly as needed for itching or allergies 6/18/2021 at Unknown time Yes Unknown, Entered By History   IRON PO Take 1 tablet by mouth daily  6/19/2021 at Unknown time Yes Reported, Patient   lisdexamfetamine (VYVANSE) 50 MG capsule Take 1 capsule (50 mg) by mouth every morning 6/19/2021 at Unknown time Yes Oliva Lovell MD   lisinopril (ZESTRIL) 20 MG tablet Take 1 tablet (20 mg) by mouth daily 6/19/2021 at Unknown time Yes Oliva Lovell MD   omeprazole (PRILOSEC) 20 MG DR capsule Take 20 mg by mouth daily 6/19/2021 at Unknown time Yes Unknown, Entered By History   vitamin D3 (CHOLECALCIFEROL) 50 mcg (2000 units) tablet Take 1 tablet by mouth daily 6/19/2021 at Unknown time Yes Reported, Patient              The information provided in this note is only as accurate as the sources available at the time of update(s)   Regina Said   Student Pharmacist

## 2021-06-19 NOTE — ED PROVIDER NOTES
History   Chief Complaint:  Pruritis, Jaundice, and Abdominal Pain     HPI   Ana Dupree is a 65 year old female with history of hypertension, hyperlipidemia, hyperparathyroidism, and recent gallstone pancreatitis who presents with pruritis, jaundice, and abdominal pain. On 05/11, the patient had a laparoscopic cholecystectomy with intra-operative cholangiograms performed by Dr. Duenas here at [facility] after having a right upper quadrant ultrasound which revealed evidence of cholelithiasis and cholecystitis. She was discharged with Oxycodone for pain management and Senokot. The patient never felt like she recovered well from her surgery hand has continued epigastric and postprandial pain since the surgery. She consulted with Dr. Duenas about her pain eight days after her surgery on 05/19 and was advised to increase her Advil from 400 mg to 600 mg.    Here, the patient reports an onset of generalized pruritis five days ago which made it uncomfortable and difficult for her to sleep. She took Benadryl with improvement with her pruritis and sleep. Today, the patient had a new onset of jaundice and dark urine which prompts her visit here at the ED. Denies any fever, nausea, vomiting, rashes or breathing difficulties. Patient is COVID-19 vaccined. She denies any other symptoms.    Review of Systems   Constitutional: Negative for fever.   Respiratory: Negative for cough and shortness of breath.    Cardiovascular: Negative for chest pain.   Gastrointestinal: Negative for nausea and vomiting.   Skin: Negative for rash.        Jaundice   All other systems reviewed and are negative.     Allergies:  No Known Allergies on file    Medications:  Vitamin D  Iron  Vyvanse  Zestril  Prilosec  Senokot  Vitamin D3    Past Medical History:    Hypertension  Hyperlipidemia  Hyperparathyroidism  Osteoporosis  Renal stones  Depression  Sleep apnea  Acute gallstone pancreatitis     Past Surgical History:    Breat  biopsy  Colonoscopy  Cholecystectomy  Lithotripsy  Mammaplasty reduction  Parathyroidectomy  Tubal ligation     Family History:    Hypertension  Cerebrovascular disease  Diabetes  Hyperlipidemia  CAD  Colon cancer  Breast cancer    Social History:  Smoking status: no  Alcohol use: no  Patient presents alone    Physical Exam     Patient Vitals for the past 24 hrs:   BP Temp Temp src Pulse Resp SpO2 Height Weight   06/19/21 1615 -- -- -- -- -- 97 % -- --   06/19/21 1530 -- -- -- -- -- 98 % -- --   06/19/21 1515 -- -- -- -- -- 98 % -- --   06/19/21 1500 -- -- -- -- -- 98 % -- --   06/19/21 1445 -- -- -- -- -- 98 % -- --   06/19/21 1400 -- -- -- -- -- 100 % -- --   06/19/21 1123 (!) 144/86 98.4  F (36.9  C) Oral 73 16 98 % 1.524 m (5') 81.6 kg (180 lb)       Physical Exam  General: Well appearing, nontoxic. Resting comfortably  Head:  Scalp, face, and head appear normal  Eyes:  Pupils are equal, round, reactive to light EOMI, no nystagmus    Conjunctivae non-injected and sclerae mildly icteric  ENT:    The external nose is normal    Pinnae are normal  Neck:  Normal range of motion    There is no rigidity noted    Trachea is in the midline  CV:  Regular rate and rhythm     Normal S1/S2, no S3/S4    No murmur or rub. Radial pulses 2+ bilaterally.  Resp:  Lungs are clear and equal bilaterally  There is no tachypnea    No increased work of breathing    No rales, wheezing, or rhonchi  GI:  Abdomen is soft, no rigidity or guarding    No distension, or mass    Mild RUQ and epigastric tenderness. No rebound tenderness. Multiple well healing laparoscopic surgical incisions C/D/I without erythema, swelling or purulence.   MS:  Normal muscular tone    Symmetric motor strength    No lower extremity edema  Skin:  Mild jaundice. No rash or acute skin lesions noted  Neuro:  Awake and alert  Speech is normal and fluent  Moves all extremities spontaneously  Psych: Normal affect. Appropriate interactions.      Emergency Department Course    Imaging:  CT Abdomen Pelvis w Contrast  1.  Intra and extrahepatic biliary ductal dilation extends to the  ampulla. No definite cause for obstruction is visualized, although CT  is relatively insensitive for choledocholithiasis. Consider MRCP.  2.  Mild left hydronephrosis with left nephrolithiasis. This may  represent chronic partial UPJ obstruction.  Per radiology.    Laboratory:  UA with microscopic: Urinebilin: Small, Urine Blood: Small, Leukocyte Esterase: Large, RBC/HPF: 9 (H), WBC/HPF: 41 (H), Bacteria: Few, Squamous Epithelial: 4 (H), Mucous: Present o/w WNL   Urine culture: pending  Ammonia: <10 (L)  CBC: WBC 5.1, HGB 1.6 (L),   INR: 1.00  PTT: 34  CMP: Glucose: 118 (H), Bilirubin: 8.8 (H), Albumin: 3.0 (L), Alkphos: 309 (H), ALT: 395 (H), AST: 179 (H) o/w WNL (Creatinine 0.67)   Lipase: 329  Bilirubin direct: 7.0 (H)    Emergency Department Course:     Reviewed:  I reviewed nursing notes, vitals, past medical history and care everywhere    Assessments:  1116 I obtained history and examined the patient as noted above.     Consults:    I spoke with Dr. Fan of the hospitalist service regarding patient's presentation, findings, and plan of care.    Interventions:  1338 Zofran 4 mg IV  1339 Morphine 4 mg IV  1342 Atarax 25 mg PO    Disposition:  The patient was admitted to the hospital under the care of Dr. Fan.       Impression & Plan     CMS Diagnoses: None    Medical Decision Making:  Ana Dupree is a 65 year old female who presents for evaluation of persistent epigastric and right upper quadrant pain as well as postprandial pain following recent episode of gallstone pancreatitis and cholecystitis status post cholecystectomy and intraoperative cholangiogram.  Patient presents with mild jaundice and scleral icterus as well as generalized pruritus.  On my evaluation she is otherwise well-appearing, hemodynamically stable and afebrile.  Abdominal exam is benign without evidence of  peritonitis or acute surgical emergency.  Her surgical incisions appear to be healing well without complication.  Given jaundice and pruritus there is concern for biliary obstruction.  Work-up in the emergency department reveals elevated bilirubin of 8.8, elevated LFTs and alkaline phosphatase.  CMP otherwise unremarkable.  CBC without leukocytosis.  Mild anemia with hemoglobin of 10.6.  CT scan of the abdomen pelvis reveals intra and extrahepatic biliary ductal dilatation with the common hepatic duct measuring up to 1.5 cm.  This is new when compared to the intraoperative cholangiogram.  Findings are concerning for biliary obstruction.  No evidence of infection or ascending cholangitis.  No evidence for intra-abdominal abscess or other infection.  Given the patient's symptoms and findings patient required mission to the hospital for ongoing monitoring evaluation and treatment.  She would likely benefit from ERCP and/or MRCP for further delineation of underlying process including possible residual biliary duct stone, stricture or other process.  Patient is agreeable to plan of care.  The case was discussed with the hospitalist and the patient was admitted in stable condition.    Covid-19  Ana Dupree was evaluated during a global COVID-19 pandemic, which necessitated consideration that the patient might be at risk for infection with the SARS-CoV-2 virus that causes COVID-19.   Applicable protocols for evaluation were followed during the patient's care.     Diagnosis:    ICD-10-CM    1. Biliary obstruction  K83.1 Asymptomatic SARS-CoV-2 COVID-19 Virus (Coronavirus) by PCR   2. Jaundice  R17        Scribe Disclosure:  Brenna DURAN, am serving as a scribe at 11:16 AM on 6/19/2021 to document services personally performed by Jeff Avila MD based on my observations and the provider's statements to me.     Brenna Enriquez  June 19, 2021    Jeff Avila MD  06/19/21 [id number]

## 2021-06-19 NOTE — PLAN OF CARE
RECEIVING UNIT ED HANDOFF REVIEW    ED Nurse Handoff Report was reviewed by: Jennifer Martinez RN on June 19, 2021 at 4:25 PM

## 2021-06-19 NOTE — LETTER
[place] [address] [facility]  [address] [place] [address]  Phone: [phone]        2021    Ana Dupree  [address]  [facility] [address] [phone] (home)     :     1955    To Whom it May Concern:    This patient was admitted to [facility] on  and dismissed on 2021. Recommend time off from work until follow up with PCP in 1 week.     Please contact me for questions or concerns.    Sincerely,  Kacey Sheikh MD.

## 2021-06-19 NOTE — ED NOTES
Bed: ED27  Expected date:   Expected time:   Means of arrival:   Comments:  triage  
[facility]  ED Nurse Handoff Report    ED Chief complaint: Pruritis, Jaundice, and Abdominal Pain      ED Diagnosis:   Final diagnoses:   Biliary obstruction   Jaundice       Code Status: Full Code    Allergies: No Known Allergies    Patient Story: Abd pain, itching and jaundice noted this week.  Focused Assessment:  See ED assessment    Treatments and/or interventions provided: See orders  Patient's response to treatments and/or interventions: See results    To be done/followed up on inpatient unit:  NA    Does this patient have any cognitive concerns?: na    Activity level - Baseline/Home:  Independent  Activity Level - Current:   Independent    Patient's Preferred language: English   Needed?: No    Isolation: None  Infection: Not Applicable  Patient tested for COVID 19 prior to admission: YES  Bariatric?: No    Vital Signs:   Vitals:    06/19/21 1123   BP: (!) 144/86   Pulse: 73   Resp: 16   Temp: 98.4  F (36.9  C)   TempSrc: Oral   SpO2: 98%   Weight: 81.6 kg (180 lb)   Height: 1.524 m (5')       Cardiac Rhythm:     Was the PSS-3 completed:   Yes  What interventions are required if any?               Family Comments: none  OBS brochure/video discussed/provided to patient/family: Yes              Name of person given brochure if not patient: na              Relationship to patient: na    For the majority of the shift this patient's behavior was Green.   Behavioral interventions performed were na.    ED NURSE PHONE NUMBER: *[phone]       
Carotid angioplasty

## 2021-06-20 ENCOUNTER — TRANSFERRED RECORDS (OUTPATIENT)
Dept: HEALTH INFORMATION MANAGEMENT | Facility: CLINIC | Age: 66
End: 2021-06-20

## 2021-06-20 ENCOUNTER — ANESTHESIA (OUTPATIENT)
Dept: SURGERY | Facility: CLINIC | Age: 66
DRG: 374 | End: 2021-06-20
Payer: COMMERCIAL

## 2021-06-20 ENCOUNTER — APPOINTMENT (OUTPATIENT)
Dept: GENERAL RADIOLOGY | Facility: CLINIC | Age: 66
DRG: 374 | End: 2021-06-20
Attending: INTERNAL MEDICINE
Payer: COMMERCIAL

## 2021-06-20 ENCOUNTER — ANESTHESIA EVENT (OUTPATIENT)
Dept: SURGERY | Facility: CLINIC | Age: 66
DRG: 374 | End: 2021-06-20
Payer: COMMERCIAL

## 2021-06-20 LAB
ALBUMIN SERPL-MCNC: 2.8 G/DL (ref 3.4–5)
ALP SERPL-CCNC: 299 U/L (ref 40–150)
ALT SERPL W P-5'-P-CCNC: 362 U/L (ref 0–50)
ANION GAP SERPL CALCULATED.3IONS-SCNC: 6 MMOL/L (ref 3–14)
AST SERPL W P-5'-P-CCNC: 173 U/L (ref 0–45)
BACTERIA SPEC CULT: NO GROWTH
BILIRUB SERPL-MCNC: 10.6 MG/DL (ref 0.2–1.3)
BUN SERPL-MCNC: 9 MG/DL (ref 7–30)
CALCIUM SERPL-MCNC: 9 MG/DL (ref 8.5–10.1)
CHLORIDE SERPL-SCNC: 105 MMOL/L (ref 94–109)
CO2 SERPL-SCNC: 24 MMOL/L (ref 20–32)
CREAT SERPL-MCNC: 0.7 MG/DL (ref 0.52–1.04)
ERCP: NORMAL
ERYTHROCYTE [DISTWIDTH] IN BLOOD BY AUTOMATED COUNT: 16 % (ref 10–15)
GFR SERPL CREATININE-BSD FRML MDRD: >90 ML/MIN/{1.73_M2}
GLUCOSE SERPL-MCNC: 103 MG/DL (ref 70–99)
HCT VFR BLD AUTO: 32 % (ref 35–47)
HGB BLD-MCNC: 10.2 G/DL (ref 11.7–15.7)
Lab: NORMAL
MCH RBC QN AUTO: 27.9 PG (ref 26.5–33)
MCHC RBC AUTO-ENTMCNC: 31.9 G/DL (ref 31.5–36.5)
MCV RBC AUTO: 88 FL (ref 78–100)
PLATELET # BLD AUTO: 237 10E9/L (ref 150–450)
POTASSIUM SERPL-SCNC: 4 MMOL/L (ref 3.4–5.3)
PROT SERPL-MCNC: 7 G/DL (ref 6.8–8.8)
RBC # BLD AUTO: 3.65 10E12/L (ref 3.8–5.2)
SODIUM SERPL-SCNC: 135 MMOL/L (ref 133–144)
SPECIMEN SOURCE: NORMAL
UPPER EUS: NORMAL
WBC # BLD AUTO: 5.6 10E9/L (ref 4–11)

## 2021-06-20 PROCEDURE — 74330 X-RAY BILE/PANC ENDOSCOPY: CPT

## 2021-06-20 PROCEDURE — 250N000011 HC RX IP 250 OP 636: Performed by: INTERNAL MEDICINE

## 2021-06-20 PROCEDURE — 80053 COMPREHEN METABOLIC PANEL: CPT | Performed by: HOSPITALIST

## 2021-06-20 PROCEDURE — 250N000013 HC RX MED GY IP 250 OP 250 PS 637: Performed by: HOSPITALIST

## 2021-06-20 PROCEDURE — 85027 COMPLETE CBC AUTOMATED: CPT | Performed by: HOSPITALIST

## 2021-06-20 PROCEDURE — 710N000009 HC RECOVERY PHASE 1, LEVEL 1, PER MIN: Performed by: INTERNAL MEDICINE

## 2021-06-20 PROCEDURE — 250N000011 HC RX IP 250 OP 636: Performed by: NURSE ANESTHETIST, CERTIFIED REGISTERED

## 2021-06-20 PROCEDURE — 36415 COLL VENOUS BLD VENIPUNCTURE: CPT | Performed by: HOSPITALIST

## 2021-06-20 PROCEDURE — 370N000017 HC ANESTHESIA TECHNICAL FEE, PER MIN: Performed by: INTERNAL MEDICINE

## 2021-06-20 PROCEDURE — 88305 TISSUE EXAM BY PATHOLOGIST: CPT | Mod: TC | Performed by: INTERNAL MEDICINE

## 2021-06-20 PROCEDURE — 250N000011 HC RX IP 250 OP 636

## 2021-06-20 PROCEDURE — 999N000141 HC STATISTIC PRE-PROCEDURE NURSING ASSESSMENT: Performed by: INTERNAL MEDICINE

## 2021-06-20 PROCEDURE — 0FJB8ZZ INSPECTION OF HEPATOBILIARY DUCT, VIA NATURAL OR ARTIFICIAL OPENING ENDOSCOPIC: ICD-10-PCS | Performed by: INTERNAL MEDICINE

## 2021-06-20 PROCEDURE — 120N000001 HC R&B MED SURG/OB

## 2021-06-20 PROCEDURE — 99232 SBSQ HOSP IP/OBS MODERATE 35: CPT | Performed by: INTERNAL MEDICINE

## 2021-06-20 PROCEDURE — 250N000011 HC RX IP 250 OP 636: Performed by: HOSPITALIST

## 2021-06-20 PROCEDURE — 250N000013 HC RX MED GY IP 250 OP 250 PS 637: Performed by: INTERNAL MEDICINE

## 2021-06-20 PROCEDURE — 258N000003 HC RX IP 258 OP 636: Performed by: ANESTHESIOLOGY

## 2021-06-20 PROCEDURE — 0DB98ZX EXCISION OF DUODENUM, VIA NATURAL OR ARTIFICIAL OPENING ENDOSCOPIC, DIAGNOSTIC: ICD-10-PCS | Performed by: INTERNAL MEDICINE

## 2021-06-20 PROCEDURE — 88305 TISSUE EXAM BY PATHOLOGIST: CPT | Mod: 26 | Performed by: PATHOLOGY

## 2021-06-20 PROCEDURE — 99207 PR MOONLIGHTING INDICATOR: CPT | Performed by: INTERNAL MEDICINE

## 2021-06-20 PROCEDURE — 360N000082 HC SURGERY LEVEL 2 W/ FLUORO, PER MIN: Performed by: INTERNAL MEDICINE

## 2021-06-20 PROCEDURE — 258N000003 HC RX IP 258 OP 636: Performed by: HOSPITALIST

## 2021-06-20 RX ORDER — HYDROXYZINE HYDROCHLORIDE 25 MG/1
25 TABLET, FILM COATED ORAL ONCE
Status: COMPLETED | OUTPATIENT
Start: 2021-06-20 | End: 2021-06-20

## 2021-06-20 RX ORDER — HYDROMORPHONE HYDROCHLORIDE 1 MG/ML
INJECTION, SOLUTION INTRAMUSCULAR; INTRAVENOUS; SUBCUTANEOUS
Status: COMPLETED
Start: 2021-06-20 | End: 2021-06-20

## 2021-06-20 RX ORDER — PIPERACILLIN SODIUM, TAZOBACTAM SODIUM 3; .375 G/15ML; G/15ML
3.38 INJECTION, POWDER, LYOPHILIZED, FOR SOLUTION INTRAVENOUS EVERY 6 HOURS
Status: DISCONTINUED | OUTPATIENT
Start: 2021-06-20 | End: 2021-06-24 | Stop reason: HOSPADM

## 2021-06-20 RX ORDER — PROPOFOL 10 MG/ML
INJECTION, EMULSION INTRAVENOUS PRN
Status: DISCONTINUED | OUTPATIENT
Start: 2021-06-20 | End: 2021-06-20

## 2021-06-20 RX ORDER — FLUMAZENIL 0.1 MG/ML
0.2 INJECTION, SOLUTION INTRAVENOUS
Status: DISCONTINUED | OUTPATIENT
Start: 2021-06-20 | End: 2021-06-21

## 2021-06-20 RX ORDER — FENTANYL CITRATE 50 UG/ML
INJECTION, SOLUTION INTRAMUSCULAR; INTRAVENOUS PRN
Status: DISCONTINUED | OUTPATIENT
Start: 2021-06-20 | End: 2021-06-20

## 2021-06-20 RX ORDER — CEFAZOLIN SODIUM 1 G/3ML
INJECTION, POWDER, FOR SOLUTION INTRAMUSCULAR; INTRAVENOUS PRN
Status: DISCONTINUED | OUTPATIENT
Start: 2021-06-20 | End: 2021-06-20

## 2021-06-20 RX ORDER — SODIUM CHLORIDE, SODIUM LACTATE, POTASSIUM CHLORIDE, CALCIUM CHLORIDE 600; 310; 30; 20 MG/100ML; MG/100ML; MG/100ML; MG/100ML
INJECTION, SOLUTION INTRAVENOUS CONTINUOUS
Status: DISCONTINUED | OUTPATIENT
Start: 2021-06-20 | End: 2021-06-20 | Stop reason: HOSPADM

## 2021-06-20 RX ORDER — HYDROXYZINE HYDROCHLORIDE 10 MG/1
10 TABLET, FILM COATED ORAL ONCE
Status: COMPLETED | OUTPATIENT
Start: 2021-06-20 | End: 2021-06-20

## 2021-06-20 RX ORDER — PROPOFOL 10 MG/ML
INJECTION, EMULSION INTRAVENOUS CONTINUOUS PRN
Status: DISCONTINUED | OUTPATIENT
Start: 2021-06-20 | End: 2021-06-20

## 2021-06-20 RX ORDER — HYDROMORPHONE HYDROCHLORIDE 1 MG/ML
.3-.5 INJECTION, SOLUTION INTRAMUSCULAR; INTRAVENOUS; SUBCUTANEOUS
Status: DISCONTINUED | OUTPATIENT
Start: 2021-06-20 | End: 2021-06-24 | Stop reason: HOSPADM

## 2021-06-20 RX ORDER — ONDANSETRON 2 MG/ML
INJECTION INTRAMUSCULAR; INTRAVENOUS PRN
Status: DISCONTINUED | OUTPATIENT
Start: 2021-06-20 | End: 2021-06-20

## 2021-06-20 RX ORDER — LISINOPRIL 5 MG/1
5 TABLET ORAL DAILY
Status: DISCONTINUED | OUTPATIENT
Start: 2021-06-20 | End: 2021-06-24 | Stop reason: HOSPADM

## 2021-06-20 RX ADMIN — DIPHENHYDRAMINE HYDROCHLORIDE 25 MG: 25 CAPSULE ORAL at 21:39

## 2021-06-20 RX ADMIN — OXYCODONE HYDROCHLORIDE 5 MG: 5 TABLET ORAL at 21:30

## 2021-06-20 RX ADMIN — SODIUM CHLORIDE, POTASSIUM CHLORIDE, SODIUM LACTATE AND CALCIUM CHLORIDE: 600; 310; 30; 20 INJECTION, SOLUTION INTRAVENOUS at 12:17

## 2021-06-20 RX ADMIN — OXYCODONE HYDROCHLORIDE 5 MG: 5 TABLET ORAL at 16:11

## 2021-06-20 RX ADMIN — HYDROMORPHONE HYDROCHLORIDE 0.5 MG: 1 INJECTION, SOLUTION INTRAMUSCULAR; INTRAVENOUS; SUBCUTANEOUS at 05:08

## 2021-06-20 RX ADMIN — PROPOFOL 150 MCG/KG/MIN: 10 INJECTION, EMULSION INTRAVENOUS at 12:30

## 2021-06-20 RX ADMIN — DIPHENHYDRAMINE HYDROCHLORIDE 50 MG: 25 CAPSULE ORAL at 08:16

## 2021-06-20 RX ADMIN — PROPOFOL 30 MG: 10 INJECTION, EMULSION INTRAVENOUS at 13:31

## 2021-06-20 RX ADMIN — HYDROMORPHONE HYDROCHLORIDE 0.3 MG: 1 INJECTION, SOLUTION INTRAMUSCULAR; INTRAVENOUS; SUBCUTANEOUS at 01:52

## 2021-06-20 RX ADMIN — DIPHENHYDRAMINE HYDROCHLORIDE 25 MG: 25 CAPSULE ORAL at 15:20

## 2021-06-20 RX ADMIN — OXYCODONE HYDROCHLORIDE 5 MG: 5 TABLET ORAL at 03:50

## 2021-06-20 RX ADMIN — HYDROXYZINE HYDROCHLORIDE 25 MG: 25 TABLET, FILM COATED ORAL at 03:50

## 2021-06-20 RX ADMIN — PROPOFOL 50 MG: 10 INJECTION, EMULSION INTRAVENOUS at 12:32

## 2021-06-20 RX ADMIN — SODIUM CHLORIDE: 9 INJECTION, SOLUTION INTRAVENOUS at 17:50

## 2021-06-20 RX ADMIN — HYDROMORPHONE HYDROCHLORIDE 0.5 MG: 1 INJECTION, SOLUTION INTRAMUSCULAR; INTRAVENOUS; SUBCUTANEOUS at 17:54

## 2021-06-20 RX ADMIN — DIPHENHYDRAMINE HYDROCHLORIDE 50 MG: 25 CAPSULE ORAL at 00:56

## 2021-06-20 RX ADMIN — ONDANSETRON 4 MG: 2 INJECTION INTRAMUSCULAR; INTRAVENOUS at 16:18

## 2021-06-20 RX ADMIN — PROPOFOL 50 MG: 10 INJECTION, EMULSION INTRAVENOUS at 12:35

## 2021-06-20 RX ADMIN — CEFAZOLIN 2 G: 1 INJECTION, POWDER, FOR SOLUTION INTRAMUSCULAR; INTRAVENOUS at 13:40

## 2021-06-20 RX ADMIN — HYDROXYZINE HYDROCHLORIDE 10 MG: 10 TABLET ORAL at 12:17

## 2021-06-20 RX ADMIN — FENTANYL CITRATE 25 MCG: 50 INJECTION, SOLUTION INTRAMUSCULAR; INTRAVENOUS at 12:29

## 2021-06-20 RX ADMIN — HYDROMORPHONE HYDROCHLORIDE 0.5 MG: 1 INJECTION, SOLUTION INTRAMUSCULAR; INTRAVENOUS; SUBCUTANEOUS at 23:47

## 2021-06-20 RX ADMIN — PIPERACILLIN SODIUM AND TAZOBACTAM SODIUM 3.38 G: 3; .375 INJECTION, POWDER, LYOPHILIZED, FOR SOLUTION INTRAVENOUS at 15:20

## 2021-06-20 RX ADMIN — PIPERACILLIN SODIUM AND TAZOBACTAM SODIUM 3.38 G: 3; .375 INJECTION, POWDER, LYOPHILIZED, FOR SOLUTION INTRAVENOUS at 21:31

## 2021-06-20 RX ADMIN — MIDAZOLAM 2 MG: 1 INJECTION INTRAMUSCULAR; INTRAVENOUS at 12:26

## 2021-06-20 RX ADMIN — DIPHENHYDRAMINE HYDROCHLORIDE 25 MG: 25 CAPSULE ORAL at 21:30

## 2021-06-20 RX ADMIN — ONDANSETRON 4 MG: 2 INJECTION INTRAMUSCULAR; INTRAVENOUS at 13:06

## 2021-06-20 RX ADMIN — OXYCODONE HYDROCHLORIDE 5 MG: 5 TABLET ORAL at 08:16

## 2021-06-20 RX ADMIN — OMEPRAZOLE 20 MG: 20 CAPSULE, DELAYED RELEASE ORAL at 08:16

## 2021-06-20 ASSESSMENT — ENCOUNTER SYMPTOMS: SEIZURES: 0

## 2021-06-20 ASSESSMENT — ACTIVITIES OF DAILY LIVING (ADL)
ADLS_ACUITY_SCORE: 14

## 2021-06-20 NOTE — PLAN OF CARE
Cognitive Concerns/ Orientation : A&O x4  BEHAVIOR & AGGRESSION TOOL COLOR: green  CIWA SCORE: n/a  ABNL VS/O2: VSS on RA  MOBILITY: independent   PAIN MANAGMENT: c/o mid upper abd pain, managed with Oxycodone, IV Dilaudid x1.   DIET: NPO now for ERCP, now clear liquids, tolerated water, but then had increased pain and nausea after fruit ice, managed with IV Zofran x1.   BOWEL/BLADDER: continent, up to bathroom   ABNL LAB/BG: Bili total 10.6, Alk Phos 299, , , Hgb 10.2.   DRAIN/DEVICES: PIV, IVF infusing   TELEMETRY RHYTHM: n/a  SKIN: jaundice, sclera yellow. Lap armani sites on abd, PJ, healing, WDL. Site on left upper back, mole removed, bandage CDI.   TESTS/PROCEDURES: ERCP today  D/C DAY/GOALS/PLACE: pending work-up   OTHER IMPORTANT INFO: LS clear, denies SOB. Given IS, instruction given, encouraged use. C/O upper abd/epigastric pain, that area tender to palpation; BS+; denies nausea. C/O itchiness, managed with PO Benadryl, which brought some relief, 1 time dose of Atarax ordered and given. GI following.

## 2021-06-20 NOTE — PLAN OF CARE
Cognitive Concerns/ Orientation : A&O x4  BEHAVIOR & AGGRESSION TOOL COLOR: green   CIWA SCORE: n/a  ABNL VS/O2: VSS on RA  MOBILITY: independent   PAIN MANAGMENT: c/o mid upper abd/epigastric pain, managed with oxycodone   DIET: regular diet for now, NPO at midnight   BOWEL/BLADDER: continent, up to bathroom   ABNL LAB/BG: Bili total 8.8, Bili direct 7.0, Alk Phos 309, , , Hgb 10.6.   DRAIN/DEVICES: PIV, IVF infusing   TELEMETRY RHYTHM: n/a  SKIN: jaundice, sclera yellow. Lap armani sites on abd, PJ, healing, WDL. Site on left upper back, mole removed, bandage replaced.   TESTS/PROCEDURES: ERCP tomorrow   D/C DAY/GOALS/PLACE: pending work-up   OTHER IMPORTANT INFO: LS clear, denies SOB. C/O upper abd/epigastric pain, that area tender to palpation; BS+; denies nausea, received Zofran in ED. C/O itchiness, managed with PO Benadryl.

## 2021-06-20 NOTE — PLAN OF CARE
Summary:   recent lap armani, in with abd pain/nausea/jaundice/itching, biliary obstruction   DATE & TIME: 6/19/2021 [id number]  Cognitive Concerns/ Orientation : A/O x4  BEHAVIOR & AGGRESSION TOOL COLOR: green, anxious at times   CIWA SCORE: n/a  ABNL VS/O2: VSS on RA  MOBILITY: independent   PAIN MANAGMENT: c/o mid upper abd/epigastric pain, managed with Oxycodone x 2, Dilaudid x1  DIET: NPO now for ERCP  BOWEL/BLADDER: continent, up to bathroom   ABNL LAB/BG: Bili total 8.8, Bili direct 7.0, Alk Phos 309, , , Hgb 10.6.   DRAIN/DEVICES: PIV, IVF infusing   TELEMETRY RHYTHM: n/a  SKIN: jaundice, sclera yellow. Lap armani sites on abd, PJ, healing, WDL. Site on left upper back, mole removed, bandage replaced.   TESTS/PROCEDURES: ERCP today  D/C DAY/GOALS/PLACE: pending work-up   OTHER IMPORTANT INFO: LS clear, denies SOB. C/O upper abd/epigastric pain; BS+; denies nausea, abd distended/tender, c/o itchiness- managed with po Benadryl and Atarax x1.

## 2021-06-20 NOTE — PROVIDER NOTIFICATION
MD Notification    Notified Person: MD    Notified Person Name:  Ulises     Notification Date/Time: 6/20/21 0844    Notification Interaction: paged provider    Purpose of Notification: HR 47-50, asymptomatic. /57.  No parameters on lisinopril. Do you want me to hold, add parameters?    Orders Received: parameters added     Comments:

## 2021-06-20 NOTE — CONSULTS
65-year-old female with history of abdominal pain significantly elevated liver function test jaundice with biliary ductal dilation both intrahepatic and extrahepatic ductal dilation.  Patient is s/p cholecystectomy on May 11.  Findings are consistent with biliary obstruction.    Plan: Clear liquid diet tonight n.p.o. after midnight plan for endoscopic ultrasound and ERCP with biliary decompression and stent placement in the morning.    Jerald Grant MD FACp  [facility]

## 2021-06-20 NOTE — ANESTHESIA POSTPROCEDURE EVALUATION
Patient: Ana Dupree    Procedure(s):  ENDOSCOPIC RETROGRADE CHOLANGIOPANCREATOGRAPHY, WITH SPHINCTEROTOMY AND BIOPSY and Endoscopic Ultrasound    Diagnosis:Biliary obstruction [K83.1]  Jaundice [R17]  Diagnosis Additional Information: No value filed.    Anesthesia Type:  General    Note:  Disposition: Inpatient   Postop Pain Control: Uneventful            Sign Out: Well controlled pain   PONV: No   Neuro/Psych: Uneventful            Sign Out: Acceptable/Baseline neuro status   Airway/Respiratory: Uneventful            Sign Out: Acceptable/Baseline resp. status   CV/Hemodynamics: Uneventful            Sign Out: Acceptable CV status   Other NRE: NONE   DID A NON-ROUTINE EVENT OCCUR? No           Last vitals:  Vitals:    06/20/21 1420 06/20/21 1430 06/20/21 1435   BP: 127/65 123/70 136/76   Pulse: (!) 47 52 59   Resp: 12 10 16   Temp: 36.8  C (98.2  F)  36.7  C (98.1  F)   SpO2: 96% 92% 93%       Last vitals prior to Anesthesia Care Transfer:  CRNA VITALS  6/20/2021 1313 - 6/20/2021 1413      6/20/2021             Resp Rate (set):  10          Electronically Signed By: Luis Alberto Paul MD  June 20, 2021  3:52 PM

## 2021-06-20 NOTE — H&P (VIEW-ONLY)
[facility]  Gastroenterology Consultation         Ana Dupree  [address]   [facility] [id number]  65 year old female    Admission Date/Time: 6/19/2021  Primary Care Provider: Oliva Lovell  Referring / Attending Physician:  Dr. Jack Fan    We were asked to see the patient in consultation by Dr. Jack Fan for evaluation of probable choledocholithiasis.      CC: abdominal pain    HPI:  Ana Dupree is a 65 year old female who has a past medical h/o hypertension, hyperlipidemia, KINGS, recent cholecystectomy on 5/11 that presented with generalzed pruritus, jaundice and abdominal pain worse after eating. Patient has had no nausea of vomiting, fever or chills, chest pain, constipation or diarrhea. Denies melena or hematochezia.  Labs note elevated LFTs, bilirubin and imaging notes dilated bile duct of 1.5 cm.    ROS: A comprehensive ten point review of systems was negative aside from those in mentioned in the HPI.      PAST MED HX:  I have reviewed this patient's medical history and updated it with pertinent information if needed.   Past Medical History:   Diagnosis Date     Essential hypertension 11/25/2016     Hyperlipidemia     elevated triglycerides     Hyperparathyroidism (H) 06/2014    referred to endo when PTH/calcium still high 11/14 and 12/14. seeing Dr. leos. dexa wtih penia at hips and porosis wrist. disc. surgery 12/14     Hypertension      Morbid obesity (H)      Osteoporosis     osteopenia at hips and osteoporosis at wrist according to dexa done through endo referral     Osteoporosis, senile 11/25/2016     Renal stones      Situational depression      Sleep apnea 2009    Doesn't use a CPAP       MEDICATIONS:   Prior to Admission Medications   Prescriptions Last Dose Informant Patient Reported? Taking?   CALCIUM PO 6/19/2021 at Unknown time Self Yes Yes   Sig: Take 1 tablet by mouth daily   IRON PO 6/19/2021 at Unknown time Self Yes Yes    Sig: Take 1 tablet by mouth daily    diphenhydrAMINE (BENADRYL) 25 MG tablet 6/18/2021 at Unknown time Self Yes Yes   Sig: Take 50 mg by mouth nightly as needed for itching or allergies   lisdexamfetamine (VYVANSE) 50 MG capsule 6/19/2021 at Unknown time Self No Yes   Sig: Take 1 capsule (50 mg) by mouth every morning   lisinopril (ZESTRIL) 20 MG tablet 6/19/2021 at Unknown time Self No Yes   Sig: Take 1 tablet (20 mg) by mouth daily   omeprazole (PRILOSEC) 20 MG DR capsule 6/19/2021 at Unknown time Self Yes Yes   Sig: Take 20 mg by mouth daily   vitamin D3 (CHOLECALCIFEROL) 50 mcg (2000 units) tablet 6/19/2021 at Unknown time Self Yes Yes   Sig: Take 1 tablet by mouth daily      Facility-Administered Medications: None       ALLERGIES: No Known Allergies    SOCIAL HISTORY:  Social History     Tobacco Use     Smoking status: Never Smoker     Smokeless tobacco: Never Used   Substance Use Topics     Alcohol use: Yes     Frequency: Monthly or less     Drinks per session: 1 or 2     Binge frequency: Never     Drug use: No       FAMILY HISTORY:  Family History   Problem Relation Age of Onset     Hypertension Mother      Cerebrovascular Disease Mother      Diabetes Mother      Hyperlipidemia Mother      Coronary Artery Disease Mother      Colon Cancer Father 90     Other Cancer Maternal Grandmother      Breast Cancer Maternal Grandmother      Cerebrovascular Disease Paternal Grandmother      No Known Problems Sister      No Known Problems Brother      No Known Problems Maternal Grandfather      No Known Problems Other        PHYSICAL EXAM:   General  Alert, oriented and confortable  Vital Signs with Ranges  Temp: 97.9  F (36.6  C) Temp src: Oral BP: 102/57 Pulse: 50   Resp: 18 SpO2: 99 % O2 Device: None (Room air)    No intake/output data recorded.    Constitutional: healthy, alert and no distress   Cardiovascular: negative, PMI normal. No lifts, heaves, or thrills. RRR. No murmurs, clicks gallops or rub  Respiratory:  negative, Percussion normal. Good diaphragmatic excursion. Lungs clear  Abdomen: Abdomen soft, non-tender. BS normal. No masses, organomegaly, positive findings: tenderness moderate epigastric and RUQ          ADDITIONAL COMMENTS:   I reviewed the patient's new clinical lab test results.   Recent Labs   Lab Test 06/19/21  1205 05/12/21  0928 05/11/21  0813   WBC 5.1 9.8 8.8   HGB 10.6* 10.0* 10.4*   MCV 88 89 89    202 214   INR 1.00  --   --      Recent Labs   Lab Test 06/19/21 2007 06/19/21  1205 05/12/21  0928 05/11/21  0813   POTASSIUM 3.9 3.8 3.5 4.1   CHLORIDE  --  107 109 108   CO2  --  24 28 26   BUN  --  12 8 12   ANIONGAP  --  6 2* 5     Recent Labs   Lab Test 06/19/21  1214 06/19/21  1205 05/12/21  0928 05/11/21  0813 07/16/18  0740 07/16/18  0740 03/22/16  1028 03/22/16  1028   ALBUMIN  --  3.0* 2.8* 2.8*   < > 3.8   < >  --    BILITOTAL  --  8.8* 1.4* 1.4*   < > 0.8   < >  --    ALT  --  395* 45 40   < > 28   < >  --    AST  --  179* 33 26   < > 22   < >  --    PROTEIN Negative  --   --   --   --  Negative  --  Negative   LIPASE  --  329 254 1,753*   < >  --   --   --     < > = values in this interval not displayed.       I reviewed the patient's new imaging results.        CONSULTATION ASSESSMENT AND PLAN:    Ana Dupree is a pleasant 65 year old female with jaundice, abdominal pain and elevated LFTS with dilated bile duct. Gi consulted for probable choledocholithiasis.    Active Problems:    Jaundice    Biliary obstruction    Patient has dilated biliary ductal dilation both intrahepatic and extrahepatic ductal dilation.  Has abdominal pain. LFTs elevated. Tbili elevated. Consistent with choledocholithiasis.     -- Plan EUS/ERCP today with probable stent placement  -- Daily CMP  -- NPO        Dedra Zimmerman, [facility].  Office: [phone]  Cell : [phone] (Dr. Grant)  Cell: [phone] (Dedra Zimmerman PA-C)

## 2021-06-20 NOTE — ANESTHESIA CARE TRANSFER NOTE
Patient: Ana Dupree    Procedure(s):  ENDOSCOPIC RETROGRADE CHOLANGIOPANCREATOGRAPHY, WITH SPHINCTEROTOMY AND BIOPSY and Endoscopic Ultrasound    Diagnosis: Biliary obstruction [K83.1]  Jaundice [R17]  Diagnosis Additional Information: No value filed.    Anesthesia Type:   No value filed.     Note:    Oropharynx: oropharynx clear of all foreign objects and spontaneously breathing  Level of Consciousness: awake  Oxygen Supplementation: room air        Vital Signs Stable: post-procedure vital signs reviewed and stable  Report to RN Given: handoff report given  Patient transferred to: PACU    Handoff Report: Identifed the Patient, Identified the Reponsible Provider, Reviewed the pertinent medical history, Discussed the surgical course, Reviewed Intra-OP anesthesia mangement and issues during anesthesia, Set expectations for post-procedure period and Allowed opportunity for questions and acknowledgement of understanding      Vitals: (Last set prior to Anesthesia Care Transfer)  CRNA VITALS  6/20/2021 1313 - 6/20/2021 1348      6/20/2021             Resp Rate (set):  10        Electronically Signed By: AFIA Parker CRNA  June 20, 2021  1:48 PM

## 2021-06-20 NOTE — PROGRESS NOTES
[facility]  Hospitalist Progress Note for 6/202/2021       Assessment and Plan:   Ana Dupree is a 65 year old female admitted on 6/19/2021.  Past history of HTN, GERD, KINGS, obesity with recent hospitalization 5/10-5/12/21 for gallstone pancreatitis and possible cholecystitis s/p lap armani with intra-op cholangiogram on 5/11 who presents with persisting epigastric and post-prandial pain and jaundice.        Choledocholithiasis  Hx gallstone pancreatitis and possible cholecystitis s/p lap armani with intra-op cholangiogram on 5/11   S/p ERCP, EUS with Duodenal abnormality  Persisting epigastric, post-prandial pain and distension following discharge, now with recent onset jaundice/icterus.  Admission LFT's consistent with obstruction, lipase wnl.  CT abd/pelvis showing intra- and extra-hepatic biliary ductal dilatation with CBD measuring 1.5cm with no identifiable cause for obstruction.  - NPO,  ml/hr, prn benadryl for itching, prn oxycodone  - LFT's unchanged & elevated Alpo4 309-> 299, AST- 179-> 173, -> 362, T bili 8.8-> 10.6  - monitor closely for fever with low threshold for antibiotics; currently afebrile without leukocytosis  - appreciate GI consult  - s/p EUS/ERCP this afternoon - reported as acquired malignant-appearing, intrinsic moderate stenosis with abnormal polypoid mucosa was found in the third portion of the duodenum and was traversed.abnormality. Bx taken.  -  on clear liq diet, repeat LFTs in AM with further  plans per Dr Grant - poss biopsy by IR in AM    Left nephrolithiasis with mild left hydronephrosis  Incidental finding on CT.  UA mildly abnormal but denies any urinary symptoms.  Cr at baseline.   - follow up with Urology outpatient (discussed with patient)     HTN  - on PTA lisinopril  - BP soft this AM, decrease PTA dose of lisinopril from 20 mg-> 5 MG/day with hold parameters     GERD  - continue PTA PPI     Morbid obesity  - hold PTA Vyvanse     Diet:    regular, NPO midnight  DVT Prophylaxis: Pneumatic Compression Devices  Chavez Catheter: not present  Code Status:   Full code        Disposition Plan     Expected discharge: 2 - 3 days, recommended to prior living arrangement once work-up and  intervention complete.    Justine Montgomery MD.  Hospitalist [phone] (7am -6 pm)               Interval History:   C/o itching. No N/V. abd discomfort better              Medications:       lisinopril  5 mg Oral Daily     omeprazole  20 mg Oral Daily     sodium chloride (PF)  3 mL Intracatheter Q8H     bisacodyl, diphenhydrAMINE, HYDROmorphone, lidocaine 4%, lidocaine (buffered or not buffered), melatonin, naloxone **OR** naloxone **OR** naloxone **OR** naloxone, ondansetron **OR** ondansetron, oxyCODONE, polyethylene glycol, prochlorperazine **OR** prochlorperazine **OR** prochlorperazine, senna-docusate **OR** senna-docusate, sodium chloride (PF)               Physical Exam:   Blood pressure 102/57, pulse 50, temperature 97.9  F (36.6  C), temperature source Oral, resp. rate 18, height 1.524 m (5'), weight 81.6 kg (180 lb), SpO2 99 %, not currently breastfeeding.  Wt Readings from Last 4 Encounters:   21 81.6 kg (180 lb)   21 82.1 kg (181 lb)   05/10/21 86.2 kg (190 lb)   21 88.5 kg (195 lb)         Vital Sign Ranges  Temperature Temp  Av.2  F (36.8  C)  Min: 97.9  F (36.6  C)  Max: 98.4  F (36.9  C)   Blood pressure Systolic (24hrs), Av , Min:102 , Max:144        Diastolic (24hrs), Av, Min:57, Max:86      Pulse Pulse  Av.8  Min: 47  Max: 73   Respirations Resp  Av.8  Min: 16  Max: 20   Pulse oximetry SpO2  Av.4 %  Min: 97 %  Max: 100 %       No intake or output data in the 24 hours ending 21 0847    Constitutional: Awake, alert, cooperative, no apparent distress   Lungs: Clear to auscultation bilaterally, no crackles or wheezing   Cardiovascular: Regular rate and rhythm, normal S1 and S2, and no murmur noted   Abdomen:  Obese, soft, tenderness w/o guarding in RUQ. + bowel sounds   Skin: No rashes, no cyanosis, no edema               Data:   All laboratory data reviewed

## 2021-06-20 NOTE — ANESTHESIA PREPROCEDURE EVALUATION
Anesthesia Pre-Procedure Evaluation    Patient: Ana Dupree   MRN: [id number] : 1955        Preoperative Diagnosis: Biliary obstruction [K83.1]  Jaundice [R17]   Procedure : Procedure(s):  ENDOSCOPIC RETROGRADE CHOLANGIOPANCREATOGRAPHY, WITH SPHINCTEROTOMY AND BIOPSY and Endoscopic Ultrasound     Past Medical History:   Diagnosis Date     Essential hypertension 2016     Hyperlipidemia     elevated triglycerides     Hyperparathyroidism (H) 2014    referred to endo when PTH/calcium still high  and . seeing Dr. leos. dexa wtih penia at hips and porosis wrist. disc. surgery      Hypertension      Morbid obesity (H)      Osteoporosis     osteopenia at hips and osteoporosis at wrist according to dexa done through endo referral     Osteoporosis, senile 2016     Renal stones      Situational depression      Sleep apnea 2009    Doesn't use a CPAP      Past Surgical History:   Procedure Laterality Date     BIOPSY BREAST  2001, 2014    excisional left side-B9 fibrocystic change. stereotactic bx left at 2 oclock for calcs. path-atypical ductal proliferation     COLONOSCOPY N/A 2017    Procedure: COMBINED COLONOSCOPY, SINGLE OR MULTIPLE BIOPSY/POLYPECTOMY BY BIOPSY;  Surgeon: Jerald Grant MD;  Location:  GI     LAPAROSCOPIC CHOLECYSTECTOMY WITH CHOLANGIOGRAMS N/A 2021    Procedure: CHOLECYSTECTOMY, LAPAROSCOPIC, WITH CHOLANGIOGRAM;  Surgeon: Gretchen Duenas MD;  Location:  OR     LITHOTRIPSY       MAMMOPLASTY REDUCTION  2014    Dr. Trujillo and Reji. had atypical cells noted on a bx but also wanted reduction so done together.     PARATHYROIDECTOMY Right 2015    Procedure: PARATHYROIDECTOMY;  Surgeon: Lynnette Sewell MD;  Location:  OR     TUBAL LIGATION  1994    post partum      No Known Allergies   Social History     Tobacco Use     Smoking status: Never Smoker     Smokeless tobacco: Never Used   Substance Use Topics      Alcohol use: Yes     Frequency: Monthly or less     Drinks per session: 1 or 2     Binge frequency: Never      Wt Readings from Last 1 Encounters:   06/19/21 81.6 kg (180 lb)        Anesthesia Evaluation            ROS/MED HX  ENT/Pulmonary:     (+) sleep apnea,     Neurologic:    (-) no seizures and no CVA   Cardiovascular:     (+) hypertension-----    METS/Exercise Tolerance: >4 METS    Hematologic:       Musculoskeletal:       GI/Hepatic: Comment: choledocolithiasis      Renal/Genitourinary:     (+) Nephrolithiasis ,     Endo:     (+) Obesity,  (-) Type II DM and thyroid disease   Psychiatric/Substance Use:     (+) psychiatric history depression     Infectious Disease:       Malignancy:       Other:            Physical Exam    Airway        Mallampati: II   TM distance: > 3 FB   Neck ROM: full   Mouth opening: > 3 cm    Respiratory Devices and Support         Dental  no notable dental history         Cardiovascular          Rhythm and rate: regular and normal     Pulmonary   pulmonary exam normal                OUTSIDE LABS:  CBC:   Lab Results   Component Value Date    WBC 5.6 06/20/2021    WBC 5.1 06/19/2021    HGB 10.2 (L) 06/20/2021    HGB 10.6 (L) 06/19/2021    HCT 32.0 (L) 06/20/2021    HCT 32.9 (L) 06/19/2021     06/20/2021     06/19/2021     BMP:   Lab Results   Component Value Date     06/20/2021     06/19/2021    POTASSIUM 4.0 06/20/2021    POTASSIUM 3.9 06/19/2021    CHLORIDE 105 06/20/2021    CHLORIDE 107 06/19/2021    CO2 24 06/20/2021    CO2 24 06/19/2021    BUN 9 06/20/2021    BUN 12 06/19/2021    CR 0.70 06/20/2021    CR 0.67 06/19/2021     (H) 06/20/2021     (H) 06/19/2021     COAGS:   Lab Results   Component Value Date    PTT 34 06/19/2021    INR 1.00 06/19/2021     POC:   Lab Results   Component Value Date     (H) 08/21/2015     HEPATIC:   Lab Results   Component Value Date    ALBUMIN 2.8 (L) 06/20/2021    PROTTOTAL 7.0 06/20/2021     (H)  06/20/2021     (H) 06/20/2021    ALKPHOS 299 (H) 06/20/2021    BILITOTAL 10.6 (H) 06/20/2021    JASWANT <10 (L) 06/19/2021     OTHER:   Lab Results   Component Value Date    A1C 5.4 11/11/2020    CRYS 9.0 06/20/2021    PHOS 2.7 11/23/2016    MAG 2.1 11/23/2016    LIPASE 329 06/19/2021    TSH 0.60 11/23/2016    T4 1.21 11/23/2016    CRP 11.0 01/20/2021       Anesthesia Plan    ASA Status:  2   NPO Status:  NPO Appropriate    Anesthesia Type: General.     - Airway: ETT   Induction: Intravenous, Propofol.   Maintenance: Balanced.        Consents    Anesthesia Plan(s) and associated risks, benefits, and realistic alternatives discussed. Questions answered and patient/representative(s) expressed understanding.     - Discussed with:  Patient         Postoperative Care    Pain management: IV analgesics, Oral pain medications.   PONV prophylaxis: Ondansetron (or other 5HT-3), Dexamethasone or Solumedrol     Comments:    *late entry note. Patient seen and evaluated prior to surgery, anesthetic plan discussed and all questions answered.            Luis Alberto Paul MD

## 2021-06-20 NOTE — CONSULTS
[facility]  Gastroenterology Consultation         Ana Dupree  [address]   [facility] [id number]  65 year old female    Admission Date/Time: 6/19/2021  Primary Care Provider: Oliva Lovell  Referring / Attending Physician:  Dr. Jack Fan    We were asked to see the patient in consultation by Dr. Jack Fan for evaluation of probable choledocholithiasis.      CC: abdominal pain    HPI:  Ana Dupree is a 65 year old female who has a past medical h/o hypertension, hyperlipidemia, KINGS, recent cholecystectomy on 5/11 that presented with generalzed pruritus, jaundice and abdominal pain worse after eating. Patient has had no nausea of vomiting, fever or chills, chest pain, constipation or diarrhea. Denies melena or hematochezia.  Labs note elevated LFTs, bilirubin and imaging notes dilated bile duct of 1.5 cm.    ROS: A comprehensive ten point review of systems was negative aside from those in mentioned in the HPI.      PAST MED HX:  I have reviewed this patient's medical history and updated it with pertinent information if needed.   Past Medical History:   Diagnosis Date     Essential hypertension 11/25/2016     Hyperlipidemia     elevated triglycerides     Hyperparathyroidism (H) 06/2014    referred to endo when PTH/calcium still high 11/14 and 12/14. seeing Dr. leos. dexa wtih penia at hips and porosis wrist. disc. surgery 12/14     Hypertension      Morbid obesity (H)      Osteoporosis     osteopenia at hips and osteoporosis at wrist according to dexa done through endo referral     Osteoporosis, senile 11/25/2016     Renal stones      Situational depression      Sleep apnea 2009    Doesn't use a CPAP       MEDICATIONS:   Prior to Admission Medications   Prescriptions Last Dose Informant Patient Reported? Taking?   CALCIUM PO 6/19/2021 at Unknown time Self Yes Yes   Sig: Take 1 tablet by mouth daily   IRON PO 6/19/2021 at Unknown time Self Yes Yes    Sig: Take 1 tablet by mouth daily    diphenhydrAMINE (BENADRYL) 25 MG tablet 6/18/2021 at Unknown time Self Yes Yes   Sig: Take 50 mg by mouth nightly as needed for itching or allergies   lisdexamfetamine (VYVANSE) 50 MG capsule 6/19/2021 at Unknown time Self No Yes   Sig: Take 1 capsule (50 mg) by mouth every morning   lisinopril (ZESTRIL) 20 MG tablet 6/19/2021 at Unknown time Self No Yes   Sig: Take 1 tablet (20 mg) by mouth daily   omeprazole (PRILOSEC) 20 MG DR capsule 6/19/2021 at Unknown time Self Yes Yes   Sig: Take 20 mg by mouth daily   vitamin D3 (CHOLECALCIFEROL) 50 mcg (2000 units) tablet 6/19/2021 at Unknown time Self Yes Yes   Sig: Take 1 tablet by mouth daily      Facility-Administered Medications: None       ALLERGIES: No Known Allergies    SOCIAL HISTORY:  Social History     Tobacco Use     Smoking status: Never Smoker     Smokeless tobacco: Never Used   Substance Use Topics     Alcohol use: Yes     Frequency: Monthly or less     Drinks per session: 1 or 2     Binge frequency: Never     Drug use: No       FAMILY HISTORY:  Family History   Problem Relation Age of Onset     Hypertension Mother      Cerebrovascular Disease Mother      Diabetes Mother      Hyperlipidemia Mother      Coronary Artery Disease Mother      Colon Cancer Father 90     Other Cancer Maternal Grandmother      Breast Cancer Maternal Grandmother      Cerebrovascular Disease Paternal Grandmother      No Known Problems Sister      No Known Problems Brother      No Known Problems Maternal Grandfather      No Known Problems Other        PHYSICAL EXAM:   General  Alert, oriented and confortable  Vital Signs with Ranges  Temp: 97.9  F (36.6  C) Temp src: Oral BP: 102/57 Pulse: 50   Resp: 18 SpO2: 99 % O2 Device: None (Room air)    No intake/output data recorded.    Constitutional: healthy, alert and no distress   Cardiovascular: negative, PMI normal. No lifts, heaves, or thrills. RRR. No murmurs, clicks gallops or rub  Respiratory:  negative, Percussion normal. Good diaphragmatic excursion. Lungs clear  Abdomen: Abdomen soft, non-tender. BS normal. No masses, organomegaly, positive findings: tenderness moderate epigastric and RUQ          ADDITIONAL COMMENTS:   I reviewed the patient's new clinical lab test results.   Recent Labs   Lab Test 06/19/21  1205 05/12/21  0928 05/11/21  0813   WBC 5.1 9.8 8.8   HGB 10.6* 10.0* 10.4*   MCV 88 89 89    202 214   INR 1.00  --   --      Recent Labs   Lab Test 06/19/21 2007 06/19/21  1205 05/12/21  0928 05/11/21  0813   POTASSIUM 3.9 3.8 3.5 4.1   CHLORIDE  --  107 109 108   CO2  --  24 28 26   BUN  --  12 8 12   ANIONGAP  --  6 2* 5     Recent Labs   Lab Test 06/19/21  1214 06/19/21  1205 05/12/21  0928 05/11/21  0813 07/16/18  0740 07/16/18  0740 03/22/16  1028 03/22/16  1028   ALBUMIN  --  3.0* 2.8* 2.8*   < > 3.8   < >  --    BILITOTAL  --  8.8* 1.4* 1.4*   < > 0.8   < >  --    ALT  --  395* 45 40   < > 28   < >  --    AST  --  179* 33 26   < > 22   < >  --    PROTEIN Negative  --   --   --   --  Negative  --  Negative   LIPASE  --  329 254 1,753*   < >  --   --   --     < > = values in this interval not displayed.       I reviewed the patient's new imaging results.        CONSULTATION ASSESSMENT AND PLAN:    Ana Dupree is a pleasant 65 year old female with jaundice, abdominal pain and elevated LFTS with dilated bile duct. Gi consulted for probable choledocholithiasis.    Active Problems:    Jaundice    Biliary obstruction    Patient has dilated biliary ductal dilation both intrahepatic and extrahepatic ductal dilation.  Has abdominal pain. LFTs elevated. Tbili elevated. Consistent with choledocholithiasis.     -- Plan EUS/ERCP today with probable stent placement  -- Daily CMP  -- NPO        Dedra Zimmerman, [facility].  Office: [phone]  Cell : [phone] (Dr. Grant)  Cell: [phone] (Dedra Zimmerman PA-C)

## 2021-06-21 LAB
ALBUMIN SERPL-MCNC: 2.8 G/DL (ref 3.4–5)
ALP SERPL-CCNC: 278 U/L (ref 40–150)
ALT SERPL W P-5'-P-CCNC: 284 U/L (ref 0–50)
ANION GAP SERPL CALCULATED.3IONS-SCNC: 6 MMOL/L (ref 3–14)
AST SERPL W P-5'-P-CCNC: 136 U/L (ref 0–45)
BILIRUB SERPL-MCNC: 12.2 MG/DL (ref 0.2–1.3)
BUN SERPL-MCNC: 7 MG/DL (ref 7–30)
CALCIUM SERPL-MCNC: 9 MG/DL (ref 8.5–10.1)
CHLORIDE SERPL-SCNC: 105 MMOL/L (ref 94–109)
CO2 SERPL-SCNC: 25 MMOL/L (ref 20–32)
CREAT SERPL-MCNC: 0.65 MG/DL (ref 0.52–1.04)
ERYTHROCYTE [DISTWIDTH] IN BLOOD BY AUTOMATED COUNT: 16.5 % (ref 10–15)
GFR SERPL CREATININE-BSD FRML MDRD: >90 ML/MIN/{1.73_M2}
GLUCOSE SERPL-MCNC: 74 MG/DL (ref 70–99)
HCT VFR BLD AUTO: 32.5 % (ref 35–47)
HGB BLD-MCNC: 10.3 G/DL (ref 11.7–15.7)
MCH RBC QN AUTO: 28.3 PG (ref 26.5–33)
MCHC RBC AUTO-ENTMCNC: 31.7 G/DL (ref 31.5–36.5)
MCV RBC AUTO: 89 FL (ref 78–100)
PLATELET # BLD AUTO: 238 10E9/L (ref 150–450)
POTASSIUM SERPL-SCNC: 3.2 MMOL/L (ref 3.4–5.3)
PROT SERPL-MCNC: 6.9 G/DL (ref 6.8–8.8)
RBC # BLD AUTO: 3.64 10E12/L (ref 3.8–5.2)
SODIUM SERPL-SCNC: 136 MMOL/L (ref 133–144)
WBC # BLD AUTO: 6 10E9/L (ref 4–11)

## 2021-06-21 PROCEDURE — 258N000003 HC RX IP 258 OP 636: Performed by: HOSPITALIST

## 2021-06-21 PROCEDURE — 85027 COMPLETE CBC AUTOMATED: CPT | Performed by: INTERNAL MEDICINE

## 2021-06-21 PROCEDURE — 80053 COMPREHEN METABOLIC PANEL: CPT | Performed by: INTERNAL MEDICINE

## 2021-06-21 PROCEDURE — 120N000001 HC R&B MED SURG/OB

## 2021-06-21 PROCEDURE — 250N000013 HC RX MED GY IP 250 OP 250 PS 637: Performed by: HOSPITALIST

## 2021-06-21 PROCEDURE — 250N000013 HC RX MED GY IP 250 OP 250 PS 637: Performed by: INTERNAL MEDICINE

## 2021-06-21 PROCEDURE — 36415 COLL VENOUS BLD VENIPUNCTURE: CPT | Performed by: INTERNAL MEDICINE

## 2021-06-21 PROCEDURE — 250N000011 HC RX IP 250 OP 636: Performed by: INTERNAL MEDICINE

## 2021-06-21 PROCEDURE — 99233 SBSQ HOSP IP/OBS HIGH 50: CPT | Performed by: HOSPITALIST

## 2021-06-21 RX ORDER — POTASSIUM CHLORIDE 1.5 G/1.58G
20 POWDER, FOR SOLUTION ORAL ONCE
Status: COMPLETED | OUTPATIENT
Start: 2021-06-21 | End: 2021-06-21

## 2021-06-21 RX ORDER — POTASSIUM CHLORIDE 1.5 G/1.58G
40 POWDER, FOR SOLUTION ORAL ONCE
Status: COMPLETED | OUTPATIENT
Start: 2021-06-21 | End: 2021-06-21

## 2021-06-21 RX ADMIN — OXYCODONE HYDROCHLORIDE 5 MG: 5 TABLET ORAL at 08:53

## 2021-06-21 RX ADMIN — HYDROMORPHONE HYDROCHLORIDE 0.5 MG: 1 INJECTION, SOLUTION INTRAMUSCULAR; INTRAVENOUS; SUBCUTANEOUS at 12:07

## 2021-06-21 RX ADMIN — OXYCODONE HYDROCHLORIDE 5 MG: 5 TABLET ORAL at 04:28

## 2021-06-21 RX ADMIN — OXYCODONE HYDROCHLORIDE 5 MG: 5 TABLET ORAL at 19:14

## 2021-06-21 RX ADMIN — DIPHENHYDRAMINE HYDROCHLORIDE 50 MG: 25 CAPSULE ORAL at 13:56

## 2021-06-21 RX ADMIN — SODIUM CHLORIDE: 9 INJECTION, SOLUTION INTRAVENOUS at 04:18

## 2021-06-21 RX ADMIN — SODIUM CHLORIDE: 9 INJECTION, SOLUTION INTRAVENOUS at 16:19

## 2021-06-21 RX ADMIN — HYDROMORPHONE HYDROCHLORIDE 0.5 MG: 1 INJECTION, SOLUTION INTRAMUSCULAR; INTRAVENOUS; SUBCUTANEOUS at 20:04

## 2021-06-21 RX ADMIN — PIPERACILLIN SODIUM AND TAZOBACTAM SODIUM 3.38 G: 3; .375 INJECTION, POWDER, LYOPHILIZED, FOR SOLUTION INTRAVENOUS at 09:00

## 2021-06-21 RX ADMIN — PIPERACILLIN SODIUM AND TAZOBACTAM SODIUM 3.38 G: 3; .375 INJECTION, POWDER, LYOPHILIZED, FOR SOLUTION INTRAVENOUS at 21:29

## 2021-06-21 RX ADMIN — HYDROMORPHONE HYDROCHLORIDE 0.5 MG: 1 INJECTION, SOLUTION INTRAMUSCULAR; INTRAVENOUS; SUBCUTANEOUS at 17:05

## 2021-06-21 RX ADMIN — LISINOPRIL 5 MG: 5 TABLET ORAL at 08:54

## 2021-06-21 RX ADMIN — POTASSIUM CHLORIDE 40 MEQ: 1.5 POWDER, FOR SOLUTION ORAL at 10:22

## 2021-06-21 RX ADMIN — OXYCODONE HYDROCHLORIDE 5 MG: 5 TABLET ORAL at 13:52

## 2021-06-21 RX ADMIN — OMEPRAZOLE 20 MG: 20 CAPSULE, DELAYED RELEASE ORAL at 08:53

## 2021-06-21 RX ADMIN — POTASSIUM CHLORIDE 20 MEQ: 1.5 POWDER, FOR SOLUTION ORAL at 17:05

## 2021-06-21 RX ADMIN — DIPHENHYDRAMINE HYDROCHLORIDE 50 MG: 25 CAPSULE ORAL at 20:04

## 2021-06-21 RX ADMIN — PIPERACILLIN SODIUM AND TAZOBACTAM SODIUM 3.38 G: 3; .375 INJECTION, POWDER, LYOPHILIZED, FOR SOLUTION INTRAVENOUS at 16:18

## 2021-06-21 RX ADMIN — PIPERACILLIN SODIUM AND TAZOBACTAM SODIUM 3.38 G: 3; .375 INJECTION, POWDER, LYOPHILIZED, FOR SOLUTION INTRAVENOUS at 04:29

## 2021-06-21 RX ADMIN — DIPHENHYDRAMINE HYDROCHLORIDE 50 MG: 25 CAPSULE ORAL at 04:21

## 2021-06-21 ASSESSMENT — ACTIVITIES OF DAILY LIVING (ADL)
ADLS_ACUITY_SCORE: 14

## 2021-06-21 NOTE — PROGRESS NOTES
Patient is on IR schedule Tuesday 6/22/21 for a Biliary drain placement at 8 am. Plan is for Dr Grant to do an ERCP and possible stent placement later tomorrow. Consent for ERCP is on the IR consent in IR.      -Labs WNL for procedure.    -Orders for NPO and antibiotics have been entered.   -Consent was obtained from patient Ana after explaining the procedure, risks and benefits and consent is in IR. Reviewed imaging with Ana also and explained approach.     [place] contact the IR department [place] [phone] for procedural related questions.     Discussed with Dr Grant and IR Dr Medina today.    Thanks Lilian Leitschuh CNP Interventional Radiology ([phone])

## 2021-06-21 NOTE — PROVIDER NOTIFICATION
MD Notification    Notified Person: MD    Notified Person Name: Dr. Sheikh    Notification Date/Time: 6/21/21 1215    Notification Interaction: page    Purpose of Notification: Pt has not yet been seen by IR. Pt also reports having some blood in stool today.    Orders Received: hbg and additional tests to be ordered later today    Comments:

## 2021-06-21 NOTE — PLAN OF CARE
Summary:   recent lap armani, in with abd pain/nausea/jaundice/itching, biliary obstruction, duodenal stenosis w/ mass  DATE & TIME: 6/21/2021   Cognitive Concerns/ Orientation : A&O x4  BEHAVIOR & AGGRESSION TOOL COLOR: green  CIWA SCORE: n/a  ABNL VS/O2: VSS on RA  MOBILITY: independent   PAIN MANAGMENT: c/o mid upper abd pain, managed with Oxycodone x2, IV dilaudid x1   DIET: C liquids, tolerating broth  BOWEL/BLADDER: continent, up to bathroom   ABNL LAB/BG: Bili total 12.2, Alk Phos 278, , , Hgb 10.3.   DRAIN/DEVICES: PIV, IVF infusing, int abx  TELEMETRY RHYTHM: n/a  SKIN: jaundice, sclera yellow. Old lap armani sites on abd, PJ, WDL. Site on left upper back, mole removed, bandage CDI.   TESTS/PROCEDURES: IR to see  D/C DAY/GOALS/PLACE: pendind  OTHER IMPORTANT INFO: abd distended/tender, c/o itchiness-managed with PO Benadryl, GI following. IR consult for biliary guidewire placement. Post IR placement of biliary guidewire plan for repeat ERCP with stent placement on day following

## 2021-06-21 NOTE — PLAN OF CARE
[id number]  Cognitive Concerns/ Orientation : A&O x4  BEHAVIOR & AGGRESSION TOOL COLOR: green  CIWA SCORE: n/a  ABNL VS/O2: VSS on RA  MOBILITY: independent   PAIN MANAGMENT: c/o mid upper abd pain, managed with Oxycodone  DIET: clear liquids - sips, encouraged to go slow after increased pain with fruit ice  BOWEL/BLADDER: continent, up to bathroom. Pt denies passing flatus.  ABNL LAB/BG: Bili total 10.6, Alk Phos 299, , , Hgb 10.2.   DRAIN/DEVICES: PIV, IVF infusing   TELEMETRY RHYTHM: n/a  SKIN: jaundice, sclera yellow. Lap armani sites on abd, PJ, healing, WDL. Site on left upper back, mole removed, bandage CDI.   TESTS/PROCEDURES: ERCP today  D/C DAY/GOALS/PLACE: pending work-up   OTHER IMPORTANT INFO: C/o itching throughout body, benadryl given. Jaundice, sclera yellow. Left jaw and neck swollen, pt reports this is new, sticky note left for MD. Denies swallowing difficulty or trouble breathing. Plan to follow up on ERCP results.

## 2021-06-21 NOTE — PLAN OF CARE
A&Ox4, VSS on RA. C/o pain managed by PRN oxycodone and dilaudid. C/o itching, on PRN benadryl. Up ad mita. Clear liq diet, voiding adequately. K+ replaced. IV infusing. Plan for IR biliary drain placement and ERCP tomorrow. Plan for K+ recheck tomorrow.

## 2021-06-21 NOTE — PROVIDER NOTIFICATION
MD Notification    Notified Person: MD    Notified Person Name:  Kori    Notification Date/Time: 6/21/2021 4:34pm     Notification Interaction: web page     Purpose of Notification: Pt's K+ level from this morning was 3.2; does pt need K+ protocol or should we re-check potassium prior to ordering? Thanks MR RASHMI     Orders Received:    Comments:

## 2021-06-21 NOTE — PROGRESS NOTES
[facility]  Hospitalist Progress Note   06/21/2021          Assessment and Plan:       Ana Dupree is  a 65 year old female with HTN, GERD, KINGS, obesity admitted on 6/19/2021  with persisting epigastric and post-prandial pain and jaundice. Recent hospitalization 5/10-5/12/21 for gallstone pancreatitis and possible cholecystitis s/p lap armani with intra-op cholangiogram on 5/11.    Biliary obstruction with jaundice.  S/p ERCP, EUS with Duodenal abnormality.  Hx gallstone pancreatitis and possible cholecystitis s/p lap armani with intra-op cholangiogram on 5/11.  Persisting epigastric, post-prandial pain and distension following discharge, now with recent onset jaundice/icterus.  Admission LFT's consistent with obstruction, lipase wnl.    CT abd/pelvis showing intra- and extra-hepatic biliary ductal dilatation with CBD measuring 1.5cm with no identifiable cause for obstruction.  s/p EUS/ERCP - reported as acquired malignant-appearing, intrinsic moderate stenosis with abnormal polypoid mucosa was found in the third portion of the duodenum and was traversed.abnormality. Bx taken.  IR consulted for biliary drain placement likely tomorrow.  [facility] gastroenterology following, plan for repeat ERCP with stent placement tomorrow.  Clear liquid diet, n.p.o. from midnight.  Follow liver function test.  Monitor closely for fever with low threshold for antibiotics; currently afebrile without leukocytosis  Follow up surgical pathology.      Hypokalemia from poor oral intake.  Potassium 3.2  Administered 40 mEq oral potassium this morning.  Repeat 20 mEq p.o. evening.  Recheck in AM.    Left nephrolithiasis with mild left hydronephrosis  Incidental finding on CT.    UA mildly abnormal but denies any urinary symptoms.  Cr at baseline.   Follow up with Urology outpatient      HTN  Decreased dose of PTA lisinopril from 20 mg-> 5 mg/day with hold parameters     GERD  Continue PTA PPI     Obesity with a BMI of  35.5.  Hold PTA Vyvanse     Orders Placed This Encounter      Clear Liquid Diet      DVT Prophylaxis: SCD, ambulate.  Code Status: Full Code  Disposition: Expected discharge in 1-2 days pending clinical improvement.    Discussed with patient, bedside RN  Time greater than 35 minutes.  More than 60% of time spent in direct patient care, care coordination, patient counseling, and formalizing plan of care.     Kacey Sheikh MD        Interval History:      Patient lying in bed.  Does admit some improvement in abdominal pain.  Tolerating clear liquid diet.  No vomiting at this time.  No diarrhea or constipation.  No headache or dizziness.  Afebrile since admission.  Icterus       Physical Exam:        Physical Exam   Temp:  [98  F (36.7  C)-98.6  F (37  C)] 98  F (36.7  C)  Pulse:  [52-59] 52  Resp:  [16-18] 16  BP: (115-129)/(53-62) 115/53  SpO2:  [92 %-97 %] 97 %    Intake/Output Summary (Last 24 hours) at 6/21/2021 1447  Last data filed at 6/20/2021 2200  Gross per 24 hour   Intake 1464 ml   Output --   Net 1464 ml       Admission Weight: 81.6 kg (180 lb)  Current Weight: 81.6 kg (180 lb)    PHYSICAL EXAM  GENERAL: Alert and oriented, yellowish discoloration of skin and eyes.  HEART: Regular rate and rhythm. S1S2. No murmurs  LUNGS: Bilateral slightly decreased breath sounds.  No wheezing or crackles.  Respirations unlabored  ABDOMEN: Soft, mild right upper quadrant tenderness, bowel sounds heard.  No guarding or rigidity.  NEURO: Moving all extremities  EXTREMITIES: No pedal edema  SKIN: Warm, dry.  PSYCHIATRY Cooperative       Medications:          lisinopril  5 mg Oral Daily     omeprazole  20 mg Oral Daily     piperacillin-tazobactam  3.375 g Intravenous Q6H     sodium chloride (PF)  3 mL Intracatheter Q8H     bisacodyl, diphenhydrAMINE, HYDROmorphone, lidocaine 4%, lidocaine (buffered or not buffered), - MEDICATION INSTRUCTIONS -, melatonin, naloxone **OR** naloxone **OR** naloxone **OR** naloxone,  ondansetron **OR** ondansetron, oxyCODONE, polyethylene glycol, prochlorperazine **OR** prochlorperazine **OR** prochlorperazine, senna-docusate **OR** senna-docusate, sodium chloride (PF), sodium chloride (PF)         Data:      All new lab and imaging data was reviewed.

## 2021-06-21 NOTE — PROGRESS NOTES
[facility]  Gastroenterology Progress Note     Ana Dupree MRN# [id number]   YOB: 1955 Age: 65 year old          Assessment and Plan:   Ana Dupree is a pleasant 65 year old female with jaundice, abdominal pain and elevated LFTS with dilated bile duct. Gi consulted for probable choledocholithiasis.     Active Problems:    Jaundice    Biliary obstructioPatient has dilated biliary ductal dilation both intrahepatic and extrahepatic ductal dilation.  Has abdominal pain. LFTs elevated. Tbili elevated.   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 CBD measured up to 20 mm  6/19 ERCP  At major papilla a bulging and deformed tortuous duodenum anatomy due to distal duodenal mass/stricutre. The bile duct was cannulated with a short nosed traction sphincterotome and needle knife. Contrast was injected.  The contrast through the ducts was poor. Deep canulation was not successful.  No improvement in symptoms    -- IR consult for biliary guidewire placement  --Post IR placement of biliary guidewire can plan repeat ERCP with stent placement on day following IR procedure  -- Daily CMP and cbc  -- clear liquid diet            Biliary obstruction  Jaundice      Interval History:   Pain tolerable with IV pain control. Has developed severe abdominal pain and nausea with clear liquids.              Review of Systems:   C: NEGATIVE for fever, chills, change in weight  E/M: NEGATIVE for ear, mouth and throat problems  R: NEGATIVE for significant cough or SOB  CV: NEGATIVE for chest pain, palpitations or peripheral edema             Medications:   I have reviewed this patient's current medications    lisinopril  5 mg Oral Daily     omeprazole  20 mg Oral Daily     piperacillin-tazobactam  3.375 g Intravenous Q6H     potassium chloride  40 mEq Oral Once     sodium chloride (PF)  3 mL Intracatheter Q8H                  Physical Exam:   Vitals were  reviewed  Vital Signs with Ranges  Temp:  [98  F (36.7  C)-98.9  F (37.2  C)] 98  F (36.7  C)  Pulse:  [47-59] 52  Resp:  [10-19] 16  BP: (115-136)/(53-84) 115/53  SpO2:  [92 %-97 %] 97 %  I/O last 3 completed shifts:  In: 1564 [P.O.:460; I.V.:1104]  Out: -   Constitutional: healthy, alert, mild distress and cooperative   Cardiovascular: negative, PMI normal. No lifts, heaves, or thrills. RRR. No murmurs, clicks gallops or rub  Respiratory: negative, Percussion normal. Good diaphragmatic excursion. Lungs clear  Abdomen: Abdomen soft, -tender. BS normal. No masses, organomegaly, positive findings: tenderness mild epigastric and RUQ           Data:   I reviewed the patient's new clinical lab test results.   Recent Labs   Lab Test 06/21/21  0913 06/20/21 0921 06/19/21  1205   WBC 6.0 5.6 5.1   HGB 10.3* 10.2* 10.6*   MCV 89 88 88    237 256   INR  --   --  1.00     Recent Labs   Lab Test 06/21/21 0913 06/20/21 0921 06/19/21 2007 06/19/21  1205   POTASSIUM 3.2* 4.0 3.9 3.8   CHLORIDE 105 105  --  107   CO2 PENDING 24  --  24   BUN PENDING 9  --  12   ANIONGAP PENDING 6  --  6     Recent Labs   Lab Test 06/21/21  0913 06/20/21  0921 06/19/21  1214 06/19/21  1205 05/12/21  0928 05/11/21  0813 07/16/18  0740 07/16/18  0740 03/22/16  1028 03/22/16  1028   ALBUMIN PENDING 2.8*  --  3.0* 2.8* 2.8*   < > 3.8   < >  --    BILITOTAL PENDING 10.6*  --  8.8* 1.4* 1.4*   < > 0.8   < >  --    ALT PENDING 362*  --  395* 45 40   < > 28   < >  --    AST PENDING 173*  --  179* 33 26   < > 22   < >  --    PROTEIN  --   --  Negative  --   --   --   --  Negative  --  Negative   LIPASE  --   --   --  329 254 1,753*   < >  --   --   --     < > = values in this interval not displayed.       I reviewed the patient's new imaging results.    All laboratory data reviewed  All imaging studies reviewed by me.    Dedra Zimmerman PA-C,  6/21/2021  [facility]  Office : [phone]  Cell: [phone] (  Rudy)  Cell: [phone] (Dedra Zimmerman PA-C)

## 2021-06-21 NOTE — PROVIDER NOTIFICATION
MD Notification    Notified Person: MD    Notified Person Name: Dr. Dedra Zimmerman    Notification Date/Time: 6/21/21 1220    Notification Interaction: Message left with  at clinic    Purpose of Notification: attempted to reach about pt reporting blood in stool today    Orders Received:    Comments:

## 2021-06-22 ENCOUNTER — ANESTHESIA (OUTPATIENT)
Dept: SURGERY | Facility: CLINIC | Age: 66
DRG: 374 | End: 2021-06-22
Payer: COMMERCIAL

## 2021-06-22 ENCOUNTER — APPOINTMENT (OUTPATIENT)
Dept: GENERAL RADIOLOGY | Facility: CLINIC | Age: 66
DRG: 374 | End: 2021-06-22
Attending: HOSPITALIST
Payer: COMMERCIAL

## 2021-06-22 ENCOUNTER — ANESTHESIA EVENT (OUTPATIENT)
Dept: SURGERY | Facility: CLINIC | Age: 66
DRG: 374 | End: 2021-06-22
Payer: COMMERCIAL

## 2021-06-22 ENCOUNTER — APPOINTMENT (OUTPATIENT)
Dept: INTERVENTIONAL RADIOLOGY/VASCULAR | Facility: CLINIC | Age: 66
DRG: 374 | End: 2021-06-22
Attending: PHYSICIAN ASSISTANT
Payer: COMMERCIAL

## 2021-06-22 LAB
ALBUMIN SERPL-MCNC: 2.4 G/DL (ref 3.4–5)
ALP SERPL-CCNC: 228 U/L (ref 40–150)
ALT SERPL W P-5'-P-CCNC: 189 U/L (ref 0–50)
ANION GAP SERPL CALCULATED.3IONS-SCNC: 5 MMOL/L (ref 3–14)
AST SERPL W P-5'-P-CCNC: 85 U/L (ref 0–45)
BILIRUB SERPL-MCNC: 12.3 MG/DL (ref 0.2–1.3)
BUN SERPL-MCNC: 5 MG/DL (ref 7–30)
CALCIUM SERPL-MCNC: 8.4 MG/DL (ref 8.5–10.1)
CHLORIDE SERPL-SCNC: 106 MMOL/L (ref 94–109)
CK SERPL-CCNC: 33 U/L (ref 30–225)
CO2 SERPL-SCNC: 24 MMOL/L (ref 20–32)
COPATH REPORT: NORMAL
CREAT SERPL-MCNC: 0.58 MG/DL (ref 0.52–1.04)
ERCP: NORMAL
ERYTHROCYTE [DISTWIDTH] IN BLOOD BY AUTOMATED COUNT: 16.6 % (ref 10–15)
GFR SERPL CREATININE-BSD FRML MDRD: >90 ML/MIN/{1.73_M2}
GGT SERPL-CCNC: 381 U/L (ref 0–40)
GLUCOSE SERPL-MCNC: 72 MG/DL (ref 70–99)
HCT VFR BLD AUTO: 29.1 % (ref 35–47)
HGB BLD-MCNC: 9.3 G/DL (ref 11.7–15.7)
INR PPP: 0.93 (ref 0.86–1.14)
MCH RBC QN AUTO: 28.3 PG (ref 26.5–33)
MCHC RBC AUTO-ENTMCNC: 32 G/DL (ref 31.5–36.5)
MCV RBC AUTO: 88 FL (ref 78–100)
PLATELET # BLD AUTO: 228 10E9/L (ref 150–450)
POTASSIUM SERPL-SCNC: 3.6 MMOL/L (ref 3.4–5.3)
PROT SERPL-MCNC: 6 G/DL (ref 6.8–8.8)
RBC # BLD AUTO: 3.29 10E12/L (ref 3.8–5.2)
SODIUM SERPL-SCNC: 135 MMOL/L (ref 133–144)
WBC # BLD AUTO: 8.2 10E9/L (ref 4–11)

## 2021-06-22 PROCEDURE — 85018 HEMOGLOBIN: CPT | Performed by: INTERNAL MEDICINE

## 2021-06-22 PROCEDURE — C1769 GUIDE WIRE: HCPCS

## 2021-06-22 PROCEDURE — 99153 MOD SED SAME PHYS/QHP EA: CPT

## 2021-06-22 PROCEDURE — 47534 PLMT BILIARY DRAINAGE CATH: CPT

## 2021-06-22 PROCEDURE — 250N000009 HC RX 250: Performed by: NURSE PRACTITIONER

## 2021-06-22 PROCEDURE — 74330 X-RAY BILE/PANC ENDOSCOPY: CPT

## 2021-06-22 PROCEDURE — 250N000011 HC RX IP 250 OP 636: Performed by: NURSE ANESTHETIST, CERTIFIED REGISTERED

## 2021-06-22 PROCEDURE — 99152 MOD SED SAME PHYS/QHP 5/>YRS: CPT

## 2021-06-22 PROCEDURE — 120N000001 HC R&B MED SURG/OB

## 2021-06-22 PROCEDURE — 250N000011 HC RX IP 250 OP 636: Performed by: INTERNAL MEDICINE

## 2021-06-22 PROCEDURE — 80053 COMPREHEN METABOLIC PANEL: CPT | Performed by: HOSPITALIST

## 2021-06-22 PROCEDURE — 710N000009 HC RECOVERY PHASE 1, LEVEL 1, PER MIN: Performed by: INTERNAL MEDICINE

## 2021-06-22 PROCEDURE — 87070 CULTURE OTHR SPECIMN AEROBIC: CPT | Performed by: RADIOLOGY

## 2021-06-22 PROCEDURE — 360N000083 HC SURGERY LEVEL 3 W/ FLUORO, PER MIN: Performed by: INTERNAL MEDICINE

## 2021-06-22 PROCEDURE — 258N000003 HC RX IP 258 OP 636: Performed by: HOSPITALIST

## 2021-06-22 PROCEDURE — 82550 ASSAY OF CK (CPK): CPT | Performed by: HOSPITALIST

## 2021-06-22 PROCEDURE — 82977 ASSAY OF GGT: CPT | Performed by: HOSPITALIST

## 2021-06-22 PROCEDURE — 99232 SBSQ HOSP IP/OBS MODERATE 35: CPT | Performed by: HOSPITALIST

## 2021-06-22 PROCEDURE — 258N000003 HC RX IP 258 OP 636: Performed by: ANESTHESIOLOGY

## 2021-06-22 PROCEDURE — 272N000196 HC ACCESSORY CR5

## 2021-06-22 PROCEDURE — 999N000141 HC STATISTIC PRE-PROCEDURE NURSING ASSESSMENT: Performed by: INTERNAL MEDICINE

## 2021-06-22 PROCEDURE — 87075 CULTR BACTERIA EXCEPT BLOOD: CPT | Performed by: RADIOLOGY

## 2021-06-22 PROCEDURE — 36415 COLL VENOUS BLD VENIPUNCTURE: CPT | Performed by: INTERNAL MEDICINE

## 2021-06-22 PROCEDURE — 250N000009 HC RX 250

## 2021-06-22 PROCEDURE — 36415 COLL VENOUS BLD VENIPUNCTURE: CPT | Performed by: HOSPITALIST

## 2021-06-22 PROCEDURE — 272N000147 HC KIT CR7

## 2021-06-22 PROCEDURE — 0F9C8ZZ DRAINAGE OF AMPULLA OF VATER, VIA NATURAL OR ARTIFICIAL OPENING ENDOSCOPIC: ICD-10-PCS | Performed by: INTERNAL MEDICINE

## 2021-06-22 PROCEDURE — 250N000011 HC RX IP 250 OP 636: Performed by: HOSPITALIST

## 2021-06-22 PROCEDURE — 85610 PROTHROMBIN TIME: CPT | Performed by: HOSPITALIST

## 2021-06-22 PROCEDURE — 272N000116 HC CATH CR1

## 2021-06-22 PROCEDURE — 250N000013 HC RX MED GY IP 250 OP 250 PS 637: Performed by: INTERNAL MEDICINE

## 2021-06-22 PROCEDURE — 250N000011 HC RX IP 250 OP 636: Performed by: NURSE PRACTITIONER

## 2021-06-22 PROCEDURE — C1729 CATH, DRAINAGE: HCPCS

## 2021-06-22 PROCEDURE — 250N000013 HC RX MED GY IP 250 OP 250 PS 637: Performed by: HOSPITALIST

## 2021-06-22 PROCEDURE — 370N000017 HC ANESTHESIA TECHNICAL FEE, PER MIN: Performed by: INTERNAL MEDICINE

## 2021-06-22 PROCEDURE — 250N000011 HC RX IP 250 OP 636

## 2021-06-22 PROCEDURE — 85027 COMPLETE CBC AUTOMATED: CPT | Performed by: HOSPITALIST

## 2021-06-22 RX ORDER — ONDANSETRON 4 MG/1
4 TABLET, ORALLY DISINTEGRATING ORAL EVERY 30 MIN PRN
Status: DISCONTINUED | OUTPATIENT
Start: 2021-06-22 | End: 2021-06-22 | Stop reason: HOSPADM

## 2021-06-22 RX ORDER — NALOXONE HYDROCHLORIDE 0.4 MG/ML
0.2 INJECTION, SOLUTION INTRAMUSCULAR; INTRAVENOUS; SUBCUTANEOUS
Status: DISCONTINUED | OUTPATIENT
Start: 2021-06-22 | End: 2021-06-22

## 2021-06-22 RX ORDER — LIDOCAINE HYDROCHLORIDE 20 MG/ML
INJECTION, SOLUTION INFILTRATION; PERINEURAL PRN
Status: DISCONTINUED | OUTPATIENT
Start: 2021-06-22 | End: 2021-06-22

## 2021-06-22 RX ORDER — SODIUM CHLORIDE AND POTASSIUM CHLORIDE 150; 900 MG/100ML; MG/100ML
INJECTION, SOLUTION INTRAVENOUS CONTINUOUS
Status: DISCONTINUED | OUTPATIENT
Start: 2021-06-22 | End: 2021-06-23

## 2021-06-22 RX ORDER — IOPAMIDOL 612 MG/ML
50 INJECTION, SOLUTION INTRAVASCULAR ONCE
Status: DISCONTINUED | OUTPATIENT
Start: 2021-06-22 | End: 2021-06-22

## 2021-06-22 RX ORDER — SODIUM CHLORIDE, SODIUM LACTATE, POTASSIUM CHLORIDE, CALCIUM CHLORIDE 600; 310; 30; 20 MG/100ML; MG/100ML; MG/100ML; MG/100ML
INJECTION, SOLUTION INTRAVENOUS CONTINUOUS
Status: DISCONTINUED | OUTPATIENT
Start: 2021-06-22 | End: 2021-06-22 | Stop reason: HOSPADM

## 2021-06-22 RX ORDER — PROPOFOL 10 MG/ML
INJECTION, EMULSION INTRAVENOUS PRN
Status: DISCONTINUED | OUTPATIENT
Start: 2021-06-22 | End: 2021-06-22

## 2021-06-22 RX ORDER — ONDANSETRON 2 MG/ML
4 INJECTION INTRAMUSCULAR; INTRAVENOUS EVERY 30 MIN PRN
Status: DISCONTINUED | OUTPATIENT
Start: 2021-06-22 | End: 2021-06-22 | Stop reason: HOSPADM

## 2021-06-22 RX ORDER — FENTANYL CITRATE 50 UG/ML
25-50 INJECTION, SOLUTION INTRAMUSCULAR; INTRAVENOUS
Status: DISCONTINUED | OUTPATIENT
Start: 2021-06-22 | End: 2021-06-22 | Stop reason: HOSPADM

## 2021-06-22 RX ORDER — FLUMAZENIL 0.1 MG/ML
0.2 INJECTION, SOLUTION INTRAVENOUS
Status: DISCONTINUED | OUTPATIENT
Start: 2021-06-22 | End: 2021-06-22

## 2021-06-22 RX ORDER — ONDANSETRON 2 MG/ML
INJECTION INTRAMUSCULAR; INTRAVENOUS PRN
Status: DISCONTINUED | OUTPATIENT
Start: 2021-06-22 | End: 2021-06-22

## 2021-06-22 RX ORDER — NALOXONE HYDROCHLORIDE 0.4 MG/ML
0.4 INJECTION, SOLUTION INTRAMUSCULAR; INTRAVENOUS; SUBCUTANEOUS
Status: DISCONTINUED | OUTPATIENT
Start: 2021-06-22 | End: 2021-06-22

## 2021-06-22 RX ORDER — FENTANYL CITRATE 50 UG/ML
25-50 INJECTION, SOLUTION INTRAMUSCULAR; INTRAVENOUS EVERY 5 MIN PRN
Status: DISCONTINUED | OUTPATIENT
Start: 2021-06-22 | End: 2021-06-22

## 2021-06-22 RX ORDER — PROPOFOL 10 MG/ML
INJECTION, EMULSION INTRAVENOUS CONTINUOUS PRN
Status: DISCONTINUED | OUTPATIENT
Start: 2021-06-22 | End: 2021-06-22

## 2021-06-22 RX ORDER — FENTANYL CITRATE 50 UG/ML
INJECTION, SOLUTION INTRAMUSCULAR; INTRAVENOUS PRN
Status: DISCONTINUED | OUTPATIENT
Start: 2021-06-22 | End: 2021-06-22

## 2021-06-22 RX ORDER — HYDROMORPHONE HYDROCHLORIDE 1 MG/ML
.3-.5 INJECTION, SOLUTION INTRAMUSCULAR; INTRAVENOUS; SUBCUTANEOUS EVERY 5 MIN PRN
Status: DISCONTINUED | OUTPATIENT
Start: 2021-06-22 | End: 2021-06-22 | Stop reason: HOSPADM

## 2021-06-22 RX ADMIN — OMEPRAZOLE 20 MG: 20 CAPSULE, DELAYED RELEASE ORAL at 09:42

## 2021-06-22 RX ADMIN — DIPHENHYDRAMINE HYDROCHLORIDE 50 MG: 25 CAPSULE ORAL at 02:18

## 2021-06-22 RX ADMIN — MIDAZOLAM 2 MG: 1 INJECTION INTRAMUSCULAR; INTRAVENOUS at 16:34

## 2021-06-22 RX ADMIN — OXYCODONE HYDROCHLORIDE 5 MG: 5 TABLET ORAL at 19:41

## 2021-06-22 RX ADMIN — HYDROMORPHONE HYDROCHLORIDE 0.5 MG: 1 INJECTION, SOLUTION INTRAMUSCULAR; INTRAVENOUS; SUBCUTANEOUS at 22:24

## 2021-06-22 RX ADMIN — PIPERACILLIN SODIUM AND TAZOBACTAM SODIUM 3.38 G: 3; .375 INJECTION, POWDER, LYOPHILIZED, FOR SOLUTION INTRAVENOUS at 22:24

## 2021-06-22 RX ADMIN — FENTANYL CITRATE 50 MCG: 50 INJECTION INTRAMUSCULAR; INTRAVENOUS at 08:09

## 2021-06-22 RX ADMIN — LIDOCAINE HYDROCHLORIDE 9 ML: 10 INJECTION, SOLUTION INFILTRATION; PERINEURAL at 08:57

## 2021-06-22 RX ADMIN — SODIUM CHLORIDE, POTASSIUM CHLORIDE, SODIUM LACTATE AND CALCIUM CHLORIDE: 600; 310; 30; 20 INJECTION, SOLUTION INTRAVENOUS at 16:20

## 2021-06-22 RX ADMIN — FENTANYL CITRATE 25 MCG: 50 INJECTION INTRAMUSCULAR; INTRAVENOUS at 08:24

## 2021-06-22 RX ADMIN — LISINOPRIL 5 MG: 5 TABLET ORAL at 09:42

## 2021-06-22 RX ADMIN — DIPHENHYDRAMINE HYDROCHLORIDE 50 MG: 25 CAPSULE ORAL at 15:50

## 2021-06-22 RX ADMIN — SODIUM CHLORIDE: 9 INJECTION, SOLUTION INTRAVENOUS at 02:56

## 2021-06-22 RX ADMIN — MIDAZOLAM HYDROCHLORIDE 1 MG: 1 INJECTION, SOLUTION INTRAMUSCULAR; INTRAVENOUS at 08:09

## 2021-06-22 RX ADMIN — FENTANYL CITRATE 50 MCG: 50 INJECTION INTRAMUSCULAR; INTRAVENOUS at 08:44

## 2021-06-22 RX ADMIN — PROPOFOL 100 MCG/KG/MIN: 10 INJECTION, EMULSION INTRAVENOUS at 16:39

## 2021-06-22 RX ADMIN — LIDOCAINE HYDROCHLORIDE 60 MG: 20 INJECTION, SOLUTION INFILTRATION; PERINEURAL at 16:45

## 2021-06-22 RX ADMIN — MIDAZOLAM HYDROCHLORIDE 0.5 MG: 1 INJECTION, SOLUTION INTRAMUSCULAR; INTRAVENOUS at 08:24

## 2021-06-22 RX ADMIN — PIPERACILLIN SODIUM AND TAZOBACTAM SODIUM 3.38 G: 3; .375 INJECTION, POWDER, LYOPHILIZED, FOR SOLUTION INTRAVENOUS at 15:20

## 2021-06-22 RX ADMIN — DIPHENHYDRAMINE HYDROCHLORIDE 50 MG: 25 CAPSULE ORAL at 22:24

## 2021-06-22 RX ADMIN — MIDAZOLAM HYDROCHLORIDE 0.5 MG: 1 INJECTION, SOLUTION INTRAMUSCULAR; INTRAVENOUS at 08:32

## 2021-06-22 RX ADMIN — DIPHENHYDRAMINE HYDROCHLORIDE 50 MG: 25 CAPSULE ORAL at 09:42

## 2021-06-22 RX ADMIN — HYDROMORPHONE HYDROCHLORIDE 0.5 MG: 1 INJECTION, SOLUTION INTRAMUSCULAR; INTRAVENOUS; SUBCUTANEOUS at 00:32

## 2021-06-22 RX ADMIN — PROPOFOL 40 MG: 10 INJECTION, EMULSION INTRAVENOUS at 16:56

## 2021-06-22 RX ADMIN — FENTANYL CITRATE 50 MCG: 50 INJECTION, SOLUTION INTRAMUSCULAR; INTRAVENOUS at 16:56

## 2021-06-22 RX ADMIN — PIPERACILLIN SODIUM AND TAZOBACTAM SODIUM 3.38 G: 3; .375 INJECTION, POWDER, LYOPHILIZED, FOR SOLUTION INTRAVENOUS at 04:03

## 2021-06-22 RX ADMIN — FENTANYL CITRATE 25 MCG: 50 INJECTION INTRAMUSCULAR; INTRAVENOUS at 08:32

## 2021-06-22 RX ADMIN — HYDROMORPHONE HYDROCHLORIDE 0.5 MG: 1 INJECTION, SOLUTION INTRAMUSCULAR; INTRAVENOUS; SUBCUTANEOUS at 11:07

## 2021-06-22 RX ADMIN — FENTANYL CITRATE 50 MCG: 50 INJECTION INTRAMUSCULAR; INTRAVENOUS at 08:52

## 2021-06-22 RX ADMIN — POTASSIUM CHLORIDE AND SODIUM CHLORIDE: 900; 150 INJECTION, SOLUTION INTRAVENOUS at 19:41

## 2021-06-22 RX ADMIN — PIPERACILLIN SODIUM AND TAZOBACTAM SODIUM 3.38 G: 3; .375 INJECTION, POWDER, LYOPHILIZED, FOR SOLUTION INTRAVENOUS at 09:30

## 2021-06-22 RX ADMIN — POTASSIUM CHLORIDE AND SODIUM CHLORIDE: 900; 150 INJECTION, SOLUTION INTRAVENOUS at 15:20

## 2021-06-22 RX ADMIN — ONDANSETRON 4 MG: 2 INJECTION INTRAMUSCULAR; INTRAVENOUS at 16:45

## 2021-06-22 ASSESSMENT — ACTIVITIES OF DAILY LIVING (ADL)
ADLS_ACUITY_SCORE: 14

## 2021-06-22 ASSESSMENT — LIFESTYLE VARIABLES: TOBACCO_USE: 0

## 2021-06-22 NOTE — PRE-PROCEDURE
GENERAL PRE-PROCEDURE:   Procedure:  Biliary drain placement with intravenous moderate sedation   Date/Time:  6/22/2021 7:20 AM    Written consent obtained?: Yes    Risks and benefits: Risks, benefits and alternatives were discussed    Consent given by:  Patient  Patient states understanding of procedure being performed: Yes    Patient's understanding of procedure matches consent: Yes    Procedure consent matches procedure scheduled: Yes    Expected level of sedation:  Moderate  Appropriately NPO:  Yes  ASA Class:  Class 3- Severe systemic disease, definite functional limitations  Mallampati  :  Grade 1- soft palate, uvula, tonsillar pillars, and posterior pharyngeal wall visible  Lungs:  Lungs clear with good breath sounds bilaterally  Heart:  Normal heart sounds and rate and systolic murmur  History & Physical reviewed:  History and physical reviewed and no updates needed  Statement of review:  I have reviewed the lab findings, diagnostic data, medications, and the plan for sedation

## 2021-06-22 NOTE — PLAN OF CARE
2/2021 07-19  Cognitive Concerns/ Orientation : A&Ox4  BEHAVIOR & AGGRESSION TOOL COLOR: Green   CIWA SCORE: n/a  ABNL VS/O2: VSS on RA  MOBILITY: independent   PAIN MANAGMENT: c/o mid upper abd pain, Dilaudid x1  DIET: NPO since midnight   BOWEL/BLADDER: continent, up to bathroom   ABNL LAB/BG:   DRAIN/DEVICES: PIV, IVF infusing   TELEMETRY RHYTHM: n/a  SKIN: jaundice, sclera yellow. Old lap armani sites on abd, PJ, healing, WDL. Site on left upper back, mole removed, bandage CDI.   TESTS/PROCEDURES: Biliary drain placement, has drained 325 bile.. ERCP today at 1600  D/C DAY/GOALS/PLACE: pending work-up   OTHER IMPORTANT INFO: C/O upper abd/epigastric pain, abd distended/tender, BS+,  c/o itchiness-managed with PO Benadryl, GI following. Pt had bx of mass ,waiting pathology.

## 2021-06-22 NOTE — IR NOTE
Patient Name: Ana Dupree  Medical Record Number: [id number]  Today's Date: June 22, 2021    Start Time: 0819  End of procedure time: 0856  Procedure: biliary drain placement with intravenous moderate sedation  Report given to: Nori  Time pt departs:  0910    Other Notes: Pt into IR suite 1 via cart IVF infusing. Pt awake and alert. To table in supine position prepped and draped with 2%. VSS. Tele SB. Dr. Crenshaw in room. Time out and procedure started. Pt tolerated procedure well. Debrief with Dr. Crenshaw. Drain placed. Dressing CDI. No complications. Pt transferred back to . Report given     Medications: Last sedation given at 0852    Versed 2mg  Fentanyl 200mcg  Lidocaine 1% 9ml    Lesley Haynes RN

## 2021-06-22 NOTE — PROCEDURES
Interventional Radiology Post-Procedure Note    Procedure: Internal external biliary drain placement.    Attending: Remington Crenshaw MD    Complications: No immediate complications.    Estimated Blood Loss:  Minimal.    Findings: Moderate biliary ductal dilatation. Complete occlusion of the bile duct at the level of the ampulla. Was able to traverse the occluded duct, and place a 10 Fr internal/external biliary drainage catheter. A sample of bile was sent for labs.    Plan: Keep catheter to gravity drainage.

## 2021-06-22 NOTE — PROGRESS NOTES
[facility]  Hospitalist Progress Note   06/22/2021          Assessment and Plan:        Ana Dupree is  a 65 year old female with HTN, GERD, KINGS, obesity admitted on 6/19/2021  with persisting epigastric and post-prandial pain and jaundice. Recent hospitalization 5/10-5/12/21 for gallstone pancreatitis and possible cholecystitis s/p lap armani with intra-op cholangiogram on 5/11.    Biliary obstruction with jaundice.  S/p ERCP, EUS with Duodenal abnormality.  Hx gallstone pancreatitis and possible cholecystitis s/p lap armani with intra-op cholangiogram on 5/11.  Persisting epigastric, post-prandial pain and distension following discharge, now with recent onset jaundice/icterus.  Admission LFT's consistent with obstruction, lipase wnl.    CT abd/pelvis showing intra- and extra-hepatic biliary ductal dilatation with CBD measuring 1.5cm with no identifiable cause for obstruction.  s/p EUS/ERCP - reported as acquired malignant-appearing, intrinsic moderate stenosis with abnormal polypoid mucosa was found in the third portion of the duodenum and was traversed.abnormality. Bx taken.  IR following, underwent internal biliary drain placement this morning   [facility] gastroenterology following, plan for repeat ERCP with stent placement today   Continue NPO.  LFTs from this morning pending.  Monitor closely for fever with low threshold for antibiotics; currently afebrile without leukocytosis  Follow up surgical pathology.     Hypokalemia from poor oral intake.  Potassium 3.2 received replacement yesterday.  Switch IV fluids to normal saline with KCl.  Recheck labs from this morning pending.    Left nephrolithiasis with mild left hydronephrosis  Incidental finding on CT.    UA mildly abnormal but denies any urinary symptoms.  Cr at baseline.   Follow up with Urology outpatient      HTN  Decreased dose of PTA lisinopril from 20 mg-> 5 mg/day with hold parameters     GERD  Continue PTA PPI     Obesity with a  BMI of 35.5.  Hold PTA Vyvanse      DVT Prophylaxis: SCD, ambulate.  Code Status: Full Code  Disposition: Expected discharge in 1-2 days pending clinical improvement.     Discussed with patient, her daughter by the bedside, gastroenterology ALEJANDRO, bedside RN   More than 60% of time spent in direct patient care, care coordination, patient counseling, and formalizing plan of care.     Kacey Sheikh MD        Interval History:      Patient lying in bed.  Complaining of abdominal pain, has had biliary drain placed by IR, draining bilious fluid.  Continues to be n.p.o., tentative plan for ERCP today with stent placement today.  No vomiting at this time.  No diarrhea or constipation.  No headache or dizziness.  Afebrile since admission.  Icterus         Physical Exam:        Physical Exam   Temp:  [98  F (36.7  C)-98.7  F (37.1  C)] 98.7  F (37.1  C)  Pulse:  [49-63] 63  Resp:  [15-28] 15  BP: (118-147)/(60-83) 138/75  SpO2:  [94 %-100 %] 97 %    Intake/Output Summary (Last 24 hours) at 6/22/2021 1337  Last data filed at 6/22/2021 1100  Gross per 24 hour   Intake --   Output 325 ml   Net -325 ml       Admission Weight: 81.6 kg (180 lb)  Current Weight: 81.6 kg (180 lb)    PHYSICAL EXAM  GENERAL: Alert and oriented, yellowish discoloration of skin and eyes.  HEART: Regular rate and rhythm. S1S2. No murmurs  LUNGS: Bilateral slightly decreased breath sounds.  No wheezing or crackles.  Respirations unlabored  ABDOMEN: Soft, mild right upper quadrant tenderness, bowel sounds heard. Drain +   No guarding or rigidity.  NEURO: Moving all extremities  EXTREMITIES: No pedal edema  SKIN: Warm, dry.  PSYCHIATRY Cooperative       Medications:          lisinopril  5 mg Oral Daily     omeprazole  20 mg Oral Daily     piperacillin-tazobactam  3.375 g Intravenous Q6H     sodium chloride (PF)  3 mL Intracatheter Q8H     bisacodyl, diphenhydrAMINE, HYDROmorphone, lidocaine 4%, lidocaine (buffered or not buffered), - MEDICATION  INSTRUCTIONS -, - MEDICATION INSTRUCTIONS -, melatonin, naloxone **OR** naloxone **OR** naloxone **OR** naloxone, ondansetron **OR** ondansetron, oxyCODONE, polyethylene glycol, prochlorperazine **OR** prochlorperazine **OR** prochlorperazine, senna-docusate **OR** senna-docusate, sodium chloride (PF), sodium chloride (PF), sodium chloride 0.9%         Data:      All new lab and imaging data was reviewed.

## 2021-06-22 NOTE — PROGRESS NOTES
[facility]  Gastroenterology Progress Note     Ana Dupree MRN# [id number]   YOB: 1955 Age: 65 year old          Assessment and Plan:   Aan Dupree is a pleasant 65 year old female with jaundice, abdominal pain and elevated LFTS with dilated bile duct. Gi consulted for probable choledocholithiasis.     Active Problems:    Jaundice    Biliary obstructioPatient has dilated biliary ductal dilation both intrahepatic and extrahepatic ductal dilation.  Has abdominal pain. LFTs elevated. Tbili elevated and elevated to 12.2.   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 CBD measured up to 20 mm  6/19 ERCP  At major papilla a bulging and deformed tortuous duodenum anatomy due to distal duodenal mass/stricutre. The bile duct was cannulated with a short nosed traction sphincterotome and needle knife. Contrast was injected.  The contrast through the ducts was poor. Deep canulation was not successful.  6/22 IR placed internal/external biliary drain     -- IR placed 10 Maori internal/external drain  -- ERCP today with stent placement and then likely will be able to remove drain  -- Daily CMP and cbc  -- NPO  -- Pending pathology         Interval History:   Had episode of bloody stool likely from EGD. Has persistent mild abdominal pain. Passed flatus.              Review of Systems:   C: NEGATIVE for fever, chills, change in weight  E/M: NEGATIVE for ear, mouth and throat problems  R: NEGATIVE for significant cough or SOB  CV: NEGATIVE for chest pain, palpitations or peripheral edema             Medications:   I have reviewed this patient's current medications    lisinopril  5 mg Oral Daily     omeprazole  20 mg Oral Daily     piperacillin-tazobactam  3.375 g Intravenous Q6H     sodium chloride (PF)  3 mL Intracatheter Q8H                  Physical Exam:   Vitals were reviewed  Vital Signs with Ranges  Temp:  [98  F (36.7  C)-98.7  F (37.1  C)]  98.7  F (37.1  C)  Pulse:  [49-63] 63  Resp:  [15-28] 15  BP: (118-147)/(60-83) 138/75  SpO2:  [94 %-100 %] 97 %  No intake/output data recorded.  Constitutional: alert, mild distress and cooperative   Cardiovascular: negative, PMI normal. No lifts, heaves, or thrills. RRR. No murmurs, clicks gallops or rub  Respiratory: negative, Percussion normal. Good diaphragmatic excursion. Lungs clear  Abdomen: Abdomen soft, tender. BS normal. No masses, organomegaly           Data:   I reviewed the patient's new clinical lab test results.   Recent Labs   Lab Test 06/21/21 0913 06/20/21 0921 06/19/21  1205   WBC 6.0 5.6 5.1   HGB 10.3* 10.2* 10.6*   MCV 89 88 88    237 256   INR  --   --  1.00     Recent Labs   Lab Test 06/21/21 0913 06/20/21 0921 06/19/21 2007 06/19/21  1205   POTASSIUM 3.2* 4.0 3.9 3.8   CHLORIDE 105 105  --  107   CO2 25 24  --  24   BUN 7 9  --  12   ANIONGAP 6 6  --  6     Recent Labs   Lab Test 06/21/21 0913 06/20/21 0921 06/19/21  1214 06/19/21  1205 05/12/21  0928 05/11/21  0813 07/16/18  0740 07/16/18  0740 03/22/16  1028 03/22/16  1028   ALBUMIN 2.8* 2.8*  --  3.0* 2.8* 2.8*   < > 3.8   < >  --    BILITOTAL 12.2* 10.6*  --  8.8* 1.4* 1.4*   < > 0.8   < >  --    * 362*  --  395* 45 40   < > 28   < >  --    * 173*  --  179* 33 26   < > 22   < >  --    PROTEIN  --   --  Negative  --   --   --   --  Negative  --  Negative   LIPASE  --   --   --  329 254 1,753*   < >  --   --   --     < > = values in this interval not displayed.       I reviewed the patient's new imaging results.    All laboratory data reviewed  All imaging studies reviewed by me.    Dedra Zimmerman PA-C,  6/22/2021  [facility]  Office : [phone]  Cell: [phone] (Dr. Grant)  Cell: [phone] (Dedra Zimmerman PA-C)

## 2021-06-22 NOTE — ANESTHESIA PREPROCEDURE EVALUATION
Anesthesia Pre-Procedure Evaluation    Patient: Ana Dupree   MRN: [id number] : 1955        Preoperative Diagnosis: Obstruction of bile duct [K83.1]  Jaundice, recurrent [R17]   Procedure : Procedure(s):  ENDOSCOPIC RETROGRADE CHOLANGIOPANCREATOGRAPHY     Past Medical History:   Diagnosis Date     Essential hypertension 2016     Hyperlipidemia     elevated triglycerides     Hyperparathyroidism (H) 2014    referred to endo when PTH/calcium still high  and . seeing Dr. leos. dexa wtih penia at hips and porosis wrist. disc. surgery      Hypertension      Morbid obesity (H)      Osteoporosis     osteopenia at hips and osteoporosis at wrist according to dexa done through endo referral     Osteoporosis, senile 2016     Renal stones      Situational depression      Sleep apnea 2009    Doesn't use a CPAP      Past Surgical History:   Procedure Laterality Date     BIOPSY BREAST  2001, 2014    excisional left side-B9 fibrocystic change. stereotactic bx left at 2 oclock for calcs. path-atypical ductal proliferation     COLONOSCOPY N/A 2017    Procedure: COMBINED COLONOSCOPY, SINGLE OR MULTIPLE BIOPSY/POLYPECTOMY BY BIOPSY;  Surgeon: Jerald Grant MD;  Location:  GI     ENDOSCOPIC RETROGRADE CHOLANGIOPANCREATOGRAM N/A 2021    Procedure: ENDOSCOPIC RETROGRADE CHOLANGIOPANCREATOGRAPHY, WITH SPHINCTEROTOMY AND BIOPSY and Endoscopic Ultrasound;  Surgeon: Jerald Grant MD;  Location:  OR     IR BILIARY DRAIN PLACEMENT  2021     LAPAROSCOPIC CHOLECYSTECTOMY WITH CHOLANGIOGRAMS N/A 2021    Procedure: CHOLECYSTECTOMY, LAPAROSCOPIC, WITH CHOLANGIOGRAM;  Surgeon: Gretchen Duenas MD;  Location:  OR     LITHOTRIPSY       MAMMOPLASTY REDUCTION  2014    Dr. Trujillo and Reji. had atypical cells noted on a bx but also wanted reduction so done together.     PARATHYROIDECTOMY Right 2015    Procedure: PARATHYROIDECTOMY;  Surgeon:  Lynnette Sewell MD;  Location: [facility] OR     TUBAL LIGATION  12/14/1994    post partum      No Known Allergies   Social History     Tobacco Use     Smoking status: Never Smoker     Smokeless tobacco: Never Used   Substance Use Topics     Alcohol use: Yes     Frequency: Monthly or less     Drinks per session: 1 or 2     Binge frequency: Never      Wt Readings from Last 1 Encounters:   06/19/21 81.6 kg (180 lb)        Anesthesia Evaluation   Pt has had prior anesthetic. Type: General.    No history of anesthetic complications       ROS/MED HX  ENT/Pulmonary:     (+) sleep apnea,  (-) tobacco use   Neurologic:       Cardiovascular:     (+) hypertension-----valvular problems/murmurs type: AI and MR Mild AI; Mild MR. Previous cardiac testing   Echo: Date: 2/19/21 Results:  Interpretation Summary  1. The left ventricle is normal in structure, function and size. The visual ejection fraction is estimated at 60%.  2. The right ventricle is normal in structure, function and size.  3. There is mild (1+) aortic regurgitation.  4. The ascending aorta is Mildly dilated. 3.7cm. Root normal at 3.5cm.    Left Ventricle  The left ventricle is normal in structure, function and size. There is normal left ventricular wall thickness. The visual ejection fraction is estimated at 60%. Left ventricular diastolic function is normal. Normal left ventricular wall motion.    Right Ventricle  The right ventricle is normal in structure, function and size.    Atria  Normal left atrial size. Right atrial size is normal. There is no atrial shunt seen.    Mitral Valve  There is mild (1+) mitral regurgitation.    Tricuspid Valve  No tricuspid regurgitation. Right ventricular systolic pressure could not be approximated due to inadequate tricuspid regurgitation.    Aortic Valve  There is mild (1+) aortic regurgitation.    Pulmonic Valve  The pulmonic valve is normal in structure and function.    Vessels  The ascending aorta is Mildly dilated. The  inferior vena cava was normal in size with preserved respiratory variability.    Pericardium  There is no pericardial effusion.    Rhythm  Sinus rhythm was noted.  Stress Test: Date: Results:    ECG Reviewed: Date: 2/24/21 Results:  Sinus tim (52 bpm)  Cath: Date: Results:      METS/Exercise Tolerance: >4 METS    Hematologic:     (+) anemia,     Musculoskeletal:   (+) arthritis,     GI/Hepatic: Comment: S/p ERCP 6/20/2021 under MAC    Increased LFT's, dilated duct    (+) GERD, cholecystitis/cholelithiasis,     Renal/Genitourinary:     (+) Nephrolithiasis ,     Endo:     (+) Obesity (Class 2),     Psychiatric/Substance Use:     (+) psychiatric history depression     Infectious Disease:       Malignancy:       Other:            Physical Exam    Airway        Mallampati: II   TM distance: > 3 FB   Neck ROM: full   Mouth opening: > 3 cm    Respiratory Devices and Support         Dental  no notable dental history         Cardiovascular   cardiovascular exam normal       Rhythm and rate: regular and normal     Pulmonary   pulmonary exam normal        breath sounds clear to auscultation           OUTSIDE LABS:  CBC:   Lab Results   Component Value Date    WBC 6.0 06/21/2021    WBC 5.6 06/20/2021    HGB 10.3 (L) 06/21/2021    HGB 10.2 (L) 06/20/2021    HCT 32.5 (L) 06/21/2021    HCT 32.0 (L) 06/20/2021     06/21/2021     06/20/2021     BMP:   Lab Results   Component Value Date     06/21/2021     06/20/2021    POTASSIUM 3.2 (L) 06/21/2021    POTASSIUM 4.0 06/20/2021    CHLORIDE 105 06/21/2021    CHLORIDE 105 06/20/2021    CO2 25 06/21/2021    CO2 24 06/20/2021    BUN 7 06/21/2021    BUN 9 06/20/2021    CR 0.65 06/21/2021    CR 0.70 06/20/2021    GLC 74 06/21/2021     (H) 06/20/2021     COAGS:   Lab Results   Component Value Date    PTT 34 06/19/2021    INR 1.00 06/19/2021     POC:   Lab Results   Component Value Date     (H) 08/21/2015     HEPATIC:   Lab Results   Component Value  Date    ALBUMIN 2.8 (L) 06/21/2021    PROTTOTAL 6.9 06/21/2021     (H) 06/21/2021     (H) 06/21/2021    ALKPHOS 278 (H) 06/21/2021    BILITOTAL 12.2 (H) 06/21/2021    JASWANT <10 (L) 06/19/2021     OTHER:   Lab Results   Component Value Date    A1C 5.4 11/11/2020    CRYS 9.0 06/21/2021    PHOS 2.7 11/23/2016    MAG 2.1 11/23/2016    LIPASE 329 06/19/2021    TSH 0.60 11/23/2016    T4 1.21 11/23/2016    CRP 11.0 01/20/2021       Anesthesia Plan    ASA Status:  2   NPO Status:  NPO Appropriate    Anesthesia Type: MAC.              Consents    Anesthesia Plan(s) and associated risks, benefits, and realistic alternatives discussed. Questions answered and patient/representative(s) expressed understanding.     - Discussed with:  Patient         Postoperative Care    Pain management: IV analgesics, Oral pain medications.   PONV prophylaxis: Ondansetron (or other 5HT-3), Dexamethasone or Solumedrol     Comments:                Caesar Marshall MD

## 2021-06-22 NOTE — PLAN OF CARE
DATE & TIME: 6/22/2021 2637-1788  Cognitive Concerns/ Orientation : A&Ox4  BEHAVIOR & AGGRESSION TOOL COLOR: Green   CIWA SCORE: n/a  ABNL VS/O2: VSS on RA  MOBILITY: independent   PAIN MANAGMENT: c/o mid upper abd pain, Dilaudid x1  DIET: NPO since midnight   BOWEL/BLADDER: continent, up to bathroom   ABNL LAB/BG: , , Alk phos 278, Hgb 10.3. K 3.2, replaced yesterday and recheck this AM.   DRAIN/DEVICES: PIV, IVF infusing   TELEMETRY RHYTHM: n/a  SKIN: jaundice, sclera yellow. Old lap armani sites on abd, PJ, healing, WDL. Site on left upper back, mole removed, bandage CDI.   TESTS/PROCEDURES: Biliary drain placement scheduled for 0800 today.   D/C DAY/GOALS/PLACE: pending work-up   OTHER IMPORTANT INFO: C/O upper abd/epigastric pain, abd distended/tender, BS+,  c/o itchiness-managed with PO Benadryl, GI following.

## 2021-06-22 NOTE — PROGRESS NOTES
Pt AOx4. VSS on RA. Independent in room. Clear liquid diet; NPO at midnight. Continent B/B; voiding adequately. K+ replaced; recheck in AM. PIV; infusing at 100 mL/hr. Pain controlled with PRN dilaudid and oxy. Plan for IR biliary drain placement and ERCP tomorrow. Discharge pending; continue to monitor

## 2021-06-22 NOTE — ANESTHESIA CARE TRANSFER NOTE
Patient: Ana Dupree    Procedure(s):  ENDOSCOPIC RETROGRADE CHOLANGIOPANCREATOGRAPHY and Sphincterotomy    Diagnosis: Obstruction of bile duct [K83.1]  Jaundice, recurrent [R17]  Diagnosis Additional Information: No value filed.    Anesthesia Type:   MAC     Note:    Oropharynx: oropharynx clear of all foreign objects and spontaneously breathing  Level of Consciousness: awake  Oxygen Supplementation: nasal cannula  Level of Supplemental Oxygen (L/min / FiO2): 3  Independent Airway: airway patency satisfactory and stable  Dentition: dentition unchanged  Vital Signs Stable: post-procedure vital signs reviewed and stable  Report to RN Given: handoff report given  Patient transferred to: PACU  Comments: At end of procedure, spontaneous respirations, patient alert to voice, able to follow commands. Oxygen via nasal cannula at 3 liters per minute to PACU. Oxygen tubing connected to wall O2 in PACU, SpO2, NiBP, and EKG monitors and alarms on and functioning, Manfred Hugger warmer connected to patient gown, report on patient's clinical status given to PACU RN, RN questions answered.  Handoff Report: Identifed the Patient, Identified the Reponsible Provider, Reviewed the pertinent medical history, Discussed the surgical course, Reviewed Intra-OP anesthesia mangement and issues during anesthesia, Set expectations for post-procedure period and Allowed opportunity for questions and acknowledgement of understanding      Vitals: (Last set prior to Anesthesia Care Transfer)  CRNA VITALS  6/22/2021 1653 - 6/22/2021 1732      6/22/2021             Pulse:  67    SpO2:  98 %    Resp Rate (set):  10        Electronically Signed By: AIFA Hamlin CRNA  June 22, 2021  5:32 PM

## 2021-06-23 ENCOUNTER — APPOINTMENT (OUTPATIENT)
Dept: CT IMAGING | Facility: CLINIC | Age: 66
DRG: 374 | End: 2021-06-23
Attending: INTERNAL MEDICINE
Payer: COMMERCIAL

## 2021-06-23 ENCOUNTER — APPOINTMENT (OUTPATIENT)
Dept: INTERVENTIONAL RADIOLOGY/VASCULAR | Facility: CLINIC | Age: 66
DRG: 374 | End: 2021-06-23
Attending: PHYSICIAN ASSISTANT
Payer: COMMERCIAL

## 2021-06-23 LAB
ALBUMIN SERPL-MCNC: 2.4 G/DL (ref 3.4–5)
ALP SERPL-CCNC: 219 U/L (ref 40–150)
ALT SERPL W P-5'-P-CCNC: 156 U/L (ref 0–50)
ANION GAP SERPL CALCULATED.3IONS-SCNC: 5 MMOL/L (ref 3–14)
AST SERPL W P-5'-P-CCNC: 59 U/L (ref 0–45)
BILIRUB SERPL-MCNC: 13 MG/DL (ref 0.2–1.3)
BUN SERPL-MCNC: 9 MG/DL (ref 7–30)
CALCIUM SERPL-MCNC: 8.5 MG/DL (ref 8.5–10.1)
CEA SERPL-MCNC: <0.5 UG/L (ref 0–2.5)
CHLORIDE SERPL-SCNC: 107 MMOL/L (ref 94–109)
CK SERPL-CCNC: 33 U/L (ref 30–225)
CO2 SERPL-SCNC: 25 MMOL/L (ref 20–32)
CREAT SERPL-MCNC: 0.66 MG/DL (ref 0.52–1.04)
CRP SERPL-MCNC: 35.1 MG/L (ref 0–8)
ERYTHROCYTE [DISTWIDTH] IN BLOOD BY AUTOMATED COUNT: 17.2 % (ref 10–15)
FERRITIN SERPL-MCNC: 354 NG/ML (ref 8–252)
GFR SERPL CREATININE-BSD FRML MDRD: >90 ML/MIN/{1.73_M2}
GLUCOSE SERPL-MCNC: 106 MG/DL (ref 70–99)
HCT VFR BLD AUTO: 28.2 % (ref 35–47)
HGB BLD-MCNC: 8.8 G/DL (ref 11.7–15.7)
HGB BLD-MCNC: 8.9 G/DL (ref 11.7–15.7)
HGB BLD-MCNC: 9.1 G/DL (ref 11.7–15.7)
IRON SATN MFR SERPL: 12 % (ref 15–46)
IRON SERPL-MCNC: 31 UG/DL (ref 35–180)
MCH RBC QN AUTO: 27.8 PG (ref 26.5–33)
MCHC RBC AUTO-ENTMCNC: 31.2 G/DL (ref 31.5–36.5)
MCV RBC AUTO: 89 FL (ref 78–100)
PLATELET # BLD AUTO: 248 10E9/L (ref 150–450)
POTASSIUM SERPL-SCNC: 4 MMOL/L (ref 3.4–5.3)
PROT SERPL-MCNC: 6 G/DL (ref 6.8–8.8)
RBC # BLD AUTO: 3.16 10E12/L (ref 3.8–5.2)
SODIUM SERPL-SCNC: 137 MMOL/L (ref 133–144)
TIBC SERPL-MCNC: 248 UG/DL (ref 240–430)
WBC # BLD AUTO: 6.4 10E9/L (ref 4–11)

## 2021-06-23 PROCEDURE — 99223 1ST HOSP IP/OBS HIGH 75: CPT | Performed by: INTERNAL MEDICINE

## 2021-06-23 PROCEDURE — 36415 COLL VENOUS BLD VENIPUNCTURE: CPT | Performed by: HOSPITALIST

## 2021-06-23 PROCEDURE — 71260 CT THORAX DX C+: CPT

## 2021-06-23 PROCEDURE — 82550 ASSAY OF CK (CPK): CPT | Performed by: HOSPITALIST

## 2021-06-23 PROCEDURE — 83540 ASSAY OF IRON: CPT | Performed by: INTERNAL MEDICINE

## 2021-06-23 PROCEDURE — 82378 CARCINOEMBRYONIC ANTIGEN: CPT | Performed by: INTERNAL MEDICINE

## 2021-06-23 PROCEDURE — 85018 HEMOGLOBIN: CPT | Performed by: HOSPITALIST

## 2021-06-23 PROCEDURE — 99232 SBSQ HOSP IP/OBS MODERATE 35: CPT | Performed by: HOSPITALIST

## 2021-06-23 PROCEDURE — 82728 ASSAY OF FERRITIN: CPT | Performed by: INTERNAL MEDICINE

## 2021-06-23 PROCEDURE — 120N000001 HC R&B MED SURG/OB

## 2021-06-23 PROCEDURE — 86301 IMMUNOASSAY TUMOR CA 19-9: CPT | Performed by: INTERNAL MEDICINE

## 2021-06-23 PROCEDURE — 250N000013 HC RX MED GY IP 250 OP 250 PS 637: Performed by: INTERNAL MEDICINE

## 2021-06-23 PROCEDURE — 250N000011 HC RX IP 250 OP 636: Performed by: INTERNAL MEDICINE

## 2021-06-23 PROCEDURE — 36415 COLL VENOUS BLD VENIPUNCTURE: CPT | Performed by: INTERNAL MEDICINE

## 2021-06-23 PROCEDURE — 47531 INJECTION FOR CHOLANGIOGRAM: CPT

## 2021-06-23 PROCEDURE — 250N000009 HC RX 250: Performed by: HOSPITALIST

## 2021-06-23 PROCEDURE — 250N000011 HC RX IP 250 OP 636: Performed by: HOSPITALIST

## 2021-06-23 PROCEDURE — 85027 COMPLETE CBC AUTOMATED: CPT | Performed by: HOSPITALIST

## 2021-06-23 PROCEDURE — 250N000013 HC RX MED GY IP 250 OP 250 PS 637: Performed by: HOSPITALIST

## 2021-06-23 PROCEDURE — 83550 IRON BINDING TEST: CPT | Performed by: INTERNAL MEDICINE

## 2021-06-23 PROCEDURE — 80053 COMPREHEN METABOLIC PANEL: CPT | Performed by: HOSPITALIST

## 2021-06-23 PROCEDURE — 86140 C-REACTIVE PROTEIN: CPT | Performed by: HOSPITALIST

## 2021-06-23 RX ORDER — FENTANYL CITRATE 50 UG/ML
25-50 INJECTION, SOLUTION INTRAMUSCULAR; INTRAVENOUS EVERY 5 MIN PRN
Status: DISCONTINUED | OUTPATIENT
Start: 2021-06-23 | End: 2021-06-23 | Stop reason: HOSPADM

## 2021-06-23 RX ORDER — NALOXONE HYDROCHLORIDE 0.4 MG/ML
0.2 INJECTION, SOLUTION INTRAMUSCULAR; INTRAVENOUS; SUBCUTANEOUS
Status: DISCONTINUED | OUTPATIENT
Start: 2021-06-23 | End: 2021-06-23 | Stop reason: HOSPADM

## 2021-06-23 RX ORDER — NALOXONE HYDROCHLORIDE 0.4 MG/ML
0.4 INJECTION, SOLUTION INTRAMUSCULAR; INTRAVENOUS; SUBCUTANEOUS
Status: DISCONTINUED | OUTPATIENT
Start: 2021-06-23 | End: 2021-06-23 | Stop reason: HOSPADM

## 2021-06-23 RX ORDER — IOPAMIDOL 755 MG/ML
91 INJECTION, SOLUTION INTRAVASCULAR ONCE
Status: COMPLETED | OUTPATIENT
Start: 2021-06-23 | End: 2021-06-23

## 2021-06-23 RX ORDER — FLUMAZENIL 0.1 MG/ML
0.2 INJECTION, SOLUTION INTRAVENOUS
Status: DISCONTINUED | OUTPATIENT
Start: 2021-06-23 | End: 2021-06-23 | Stop reason: HOSPADM

## 2021-06-23 RX ADMIN — HYDROMORPHONE HYDROCHLORIDE 0.5 MG: 1 INJECTION, SOLUTION INTRAMUSCULAR; INTRAVENOUS; SUBCUTANEOUS at 21:21

## 2021-06-23 RX ADMIN — DIPHENHYDRAMINE HYDROCHLORIDE 50 MG: 25 CAPSULE ORAL at 04:18

## 2021-06-23 RX ADMIN — PIPERACILLIN SODIUM AND TAZOBACTAM SODIUM 3.38 G: 3; .375 INJECTION, POWDER, LYOPHILIZED, FOR SOLUTION INTRAVENOUS at 09:22

## 2021-06-23 RX ADMIN — SODIUM CHLORIDE 68 ML: 9 INJECTION, SOLUTION INTRAVENOUS at 16:22

## 2021-06-23 RX ADMIN — IOPAMIDOL 91 ML: 755 INJECTION, SOLUTION INTRAVENOUS at 16:21

## 2021-06-23 RX ADMIN — PIPERACILLIN SODIUM AND TAZOBACTAM SODIUM 3.38 G: 3; .375 INJECTION, POWDER, LYOPHILIZED, FOR SOLUTION INTRAVENOUS at 16:43

## 2021-06-23 RX ADMIN — POTASSIUM CHLORIDE AND SODIUM CHLORIDE: 900; 150 INJECTION, SOLUTION INTRAVENOUS at 08:05

## 2021-06-23 RX ADMIN — OMEPRAZOLE 20 MG: 20 CAPSULE, DELAYED RELEASE ORAL at 09:22

## 2021-06-23 RX ADMIN — PIPERACILLIN SODIUM AND TAZOBACTAM SODIUM 3.38 G: 3; .375 INJECTION, POWDER, LYOPHILIZED, FOR SOLUTION INTRAVENOUS at 22:36

## 2021-06-23 RX ADMIN — PIPERACILLIN SODIUM AND TAZOBACTAM SODIUM 3.38 G: 3; .375 INJECTION, POWDER, LYOPHILIZED, FOR SOLUTION INTRAVENOUS at 04:07

## 2021-06-23 RX ADMIN — OXYCODONE HYDROCHLORIDE 5 MG: 5 TABLET ORAL at 05:02

## 2021-06-23 RX ADMIN — LISINOPRIL 5 MG: 5 TABLET ORAL at 09:22

## 2021-06-23 ASSESSMENT — ACTIVITIES OF DAILY LIVING (ADL)
ADLS_ACUITY_SCORE: 14

## 2021-06-23 NOTE — PLAN OF CARE
DATE & TIME: 6/22 1900-0730   Cognitive Concerns/ Orientation : A/Ox4  BEHAVIOR & AGGRESSION TOOL COLOR: green  CIWA SCORE: NA  ABNL VS/O2: VSS on RA   MOBILITY: up independent   PAIN MANAGMENT: PRN dilaudid given x1 for pain, benadryl for itching   DIET: NPO at midnight for potential ERCP tomorrow with Dr. Grant  BOWEL/BLADDER: continent   ABNL LAB/BG: biopsy positive for adenocarcinoma, Hbg 9.1  DRAIN/DEVICES: biliary drain with bloody output (Dr. Grant aware)  TELEMETRY RHYTHM: NA   SKIN: intact, jaundiced   TESTS/PROCEDURES: pending GI/Onc plan  D/C DAY/GOALS/PLACE: pending   OTHER IMPORTANT INFO: New dx of cancer, hem/onc consult ordered and patient to be seen

## 2021-06-23 NOTE — ANESTHESIA POSTPROCEDURE EVALUATION
Patient: Ana Dupree    Procedure(s):  ENDOSCOPIC RETROGRADE CHOLANGIOPANCREATOGRAPHY and Sphincterotomy    Diagnosis:Obstruction of bile duct [K83.1]  Jaundice, recurrent [R17]  Diagnosis Additional Information: No value filed.    Anesthesia Type:  MAC    Note:  Disposition: Inpatient   Postop Pain Control: Uneventful            Sign Out: Well controlled pain   PONV: No   Neuro/Psych: Uneventful            Sign Out: Acceptable/Baseline neuro status   Airway/Respiratory: Uneventful            Sign Out: Acceptable/Baseline resp. status   CV/Hemodynamics: Uneventful            Sign Out: Acceptable CV status; No obvious hypovolemia; No obvious fluid overload   Other NRE: NONE   DID A NON-ROUTINE EVENT OCCUR? No           Last vitals:  Vitals:    06/22/21 1845 06/22/21 1900 06/22/21 1941   BP: 126/62 134/72    Pulse: 56 54    Resp: 18 18 16   Temp:      SpO2: 96% 94%        Last vitals prior to Anesthesia Care Transfer:  CRNA VITALS  6/22/2021 1653 - 6/22/2021 1753      6/22/2021             Pulse:  67    SpO2:  98 %    Resp Rate (set):  10          Electronically Signed By: Adriano Mcginnis MD  June 22, 2021  10:12 PM

## 2021-06-23 NOTE — PROGRESS NOTES
[facility]  Hospitalist Progress Note   06/23/2021          Assessment and Plan:        Ana Dupree is  a 65 year old female with HTN, GERD, KINGS, obesity admitted on 6/19/2021  with persisting epigastric and post-prandial pain and jaundice. Recent hospitalization 5/10-5/12/21 for gallstone pancreatitis and possible cholecystitis s/p lap armani with intra-op cholangiogram on 5/11.    Duodenal adenocarcinoma.  Biliary obstruction with jaundice.  Hx gallstone pancreatitis and possible cholecystitis s/p lap armani with intra-op cholangiogram on 5/11.  Persisting epigastric, post-prandial pain and distension following discharge, now with recent onset jaundice/icterus.  Admission LFT's consistent with obstruction, lipase wnl.    CT abd/pelvis showing intra- and extra-hepatic biliary ductal dilatation with CBD measuring 1.5cm with no identifiable cause for obstruction.  s/p EUS/ERCP on 6/20- reported as acquired malignant-appearing, intrinsic moderate stenosis with abnormal polypoid mucosa was found in the third portion of the duodenum and was traversed.abnormality.  Underwent internal/external biliary drain placement by IR on 6/22.  Repeat ERCP 6/22  duodenal tumor likely malignant, not treated.  Biopsy from small intestine, duodenum third portion showed moderate to poorly differentiated adenocarcinoma, fragments of tubulovillous adenoma.  [facility] gastro[facility]ology, IR following, tentative plan for possible ERCP today.  Continue NPO. Hold off IV fluids as patient complaining of peripheral edema.   Continue drain care   Continue IV zosyn [6/20], preliminary cultures from the biliary fluid negative.  Follow final culture results, de-escalate antibiotics.  LFTs improving.  Monitor closely for fever with low threshold for antibiotics; currently afebrile without leukocytosis  Oncology consult requested.    Supportive care.    Hypokalemia from poor oral intake -replaced.  Potassium 3.2 received  replacement.  Monitor    Left nephrolithiasis with mild left hydronephrosis  Bacteriuria with cultures negative.  Incidental finding on CT.    UA with small blood, large leukocyte esterase, WBCs and few bacteria.  Urine cultures negative so far.    Creatinine normal.  On antibiotics as above.  Follow up with Urology outpatient      HTN  Decreased dose of PTA lisinopril from 20 mg-> 5 mg/day with hold parameters     GERD  Continue PTA PPI     Obesity with a BMI of 35.5.  Hold PTA Vyvanse      DVT Prophylaxis: SCD, ambulate.  Code Status: Full Code  Disposition: Expected discharge in 2 days pending clinical improvement.     Discussed with patient, gastroenterology ALEJANDRO, bedside RN   Total time greater than 35 minutes, more than 65% of time spent in direct patient care, care coordination, patient counseling, and formalizing plan of care.     Kacey Sheikh MD        Interval History:      Patient lying in bed.  Complaining of abdominal pain, has had biliary drain placed by IR 6/22.   ERCP 6/22   Continues to be n.p.o., tentative plan for procedure today - ERCP.   No vomiting at this time.  No diarrhea or constipation.  No headache or dizziness.  Afebrile since admission.  Icterus.  Expressing frustration with care, n.p.o. status.  Reporting that she is concerned with ongoing abdominal pain.  Emotional, considering a second opinion at [facility].  Concerned about swelling in hands and legs.         Physical Exam:        Physical Exam   Temp:  [97.6  F (36.4  C)-98.3  F (36.8  C)] 98.3  F (36.8  C)  Pulse:  [49-63] 53  Resp:  [16-20] 16  BP: (112-134)/(62-72) 122/68  SpO2:  [94 %-98 %] 96 %    Intake/Output Summary (Last 24 hours) at 6/22/2021 1337  Last data filed at 6/22/2021 1100  Gross per 24 hour   Intake --   Output 325 ml   Net -325 ml       Admission Weight: 81.6 kg (180 lb)  Current Weight: 81.6 kg (180 lb)    PHYSICAL EXAM  GENERAL: Alert and oriented, yellowish discoloration of skin and eyes.  HEART: Regular rate  and rhythm. S1S2. No murmurs  LUNGS: Bilateral slightly decreased breath sounds.  No wheezing or crackles.  Respirations unlabored  ABDOMEN: Soft, bowel sounds heard. Drain with bloody fluid +   No guarding or rigidity.  Diffuse tenderness.  NEURO: Moving all extremities  EXTREMITIES: No pedal edema  SKIN: Warm, dry.  PSYCHIATRY emotional       Medications:          lisinopril  5 mg Oral Daily     omeprazole  20 mg Oral Daily     piperacillin-tazobactam  3.375 g Intravenous Q6H     sodium chloride (PF)  3 mL Intracatheter Q8H     bisacodyl, diphenhydrAMINE, HYDROmorphone, lidocaine 4%, lidocaine (buffered or not buffered), - MEDICATION INSTRUCTIONS -, melatonin, naloxone **OR** naloxone **OR** naloxone **OR** naloxone, ondansetron **OR** ondansetron, oxyCODONE, polyethylene glycol, prochlorperazine **OR** prochlorperazine **OR** prochlorperazine, senna-docusate **OR** senna-docusate, sodium chloride (PF), sodium chloride (PF)         Data:      All new lab and imaging data was reviewed.

## 2021-06-23 NOTE — PROGRESS NOTES
[facility]  Gastroenterology Progress Note     Ana Dupree MRN# [id number]   YOB: 1955 Age: 65 year old          Assessment and Plan:   Ana Dupree is a pleasant 65 year old female with jaundice, abdominal pain and elevated LFTS with dilated bile duct. Gi consulted for probable choledocholithiasis.     Active Problems:    Jaundice    Biliary obstruction    Duodenal adenocarcinoma  Patient has dilated biliary ductal dilation both intrahepatic and extrahepatic ductal dilation.  Has abdominal pain. LFTs elevated. Tbili elevated and elevated to 13.0   6/19 EUS noted duodenal stenosis with intramural mass found in the ampulla. CBD measured up to 20 mm  6/19 ERCP  At major papilla a bulging and deformed tortuous duodenum anatomy due to distal duodenal mass/stricutre. The bile duct was cannulated with a short nosed traction sphincterotome and needle knife. Contrast was injected.  The contrast through the ducts was poor. Deep canulation was not successful.  6/22 IR placed internal/external biliary drain  6/22 ERCP attempted to place biliary stent- but unable to find biliary drain in duodenum. CBD cannulation was attempted.Contrast was injected through the drain percutaneously, however, no contrast flow seen extending in the duodenum.  Pathology noted duodenal adenocarcinoma- patient is aware     -- IR placed 10 [place] internal/external drain  -- Attempting to coordinate ERCP today with stent placement in conjuction with IR help vs IR replacing/adjusting internal/external biliary drain- time pending. I have called IR to determine plan of care. Dr. Wong to discuss further with Dr. Grant.   -- Daily CMP and cbc  -- NPO  -- Heme/Onc consult pending                 Interval History:   no new complaints, doing well, denies chest pain, denies shortness of breath, denies abdominal pain, alert, oriented to person, place and time and doing well; no cp, sob, n/v/d, or abd  pain.              Review of Systems:   C: NEGATIVE for fever, chills, change in weight  E/M: NEGATIVE for ear, mouth and throat problems  R: NEGATIVE for significant cough or SOB  CV: NEGATIVE for chest pain, palpitations or peripheral edema             Medications:   I have reviewed this patient's current medications    lisinopril  5 mg Oral Daily     omeprazole  20 mg Oral Daily     piperacillin-tazobactam  3.375 g Intravenous Q6H     sodium chloride (PF)  3 mL Intracatheter Q8H                  Physical Exam:   Vitals were reviewed  Vital Signs with Ranges  Temp:  [97.6  F (36.4  C)-98.3  F (36.8  C)] 98.3  F (36.8  C)  Pulse:  [49-63] 53  Resp:  [16-20] 16  BP: (112-134)/(62-72) 122/68  SpO2:  [94 %-98 %] 96 %  I/O last 3 completed shifts:  In: 325 [I.V.:325]  Out: 575 [Drains:575]  Constitutional: healthy, alert and no distress   Cardiovascular: negative, PMI normal. No lifts, heaves, or thrills. RRR. No murmurs, clicks gallops or rub  Respiratory: negative, Percussion normal. Good diaphragmatic excursion. Lungs clear  Abdomen: Abdomen soft, non-tender. BS normal. No masses, organomegaly             Data:   I reviewed the patient's new clinical lab test results.   Recent Labs   Lab Test 06/23/21  0710 06/22/21  2346 06/22/21  1421 06/21/21  0913 06/19/21  1205 06/19/21  1205   WBC 6.4  --  8.2 6.0   < > 5.1   HGB 8.8* 9.1* 9.3* 10.3*   < > 10.6*   MCV 89  --  88 89   < > 88     --  228 238   < > 256   INR  --   --  0.93  --   --  1.00    < > = values in this interval not displayed.     Recent Labs   Lab Test 06/23/21  0710 06/22/21  1421 06/21/21  0913   POTASSIUM 4.0 3.6 3.2*   CHLORIDE 107 106 105   CO2 25 24 25   BUN 9 5* 7   ANIONGAP 5 5 6     Recent Labs   Lab Test 06/23/21  0710 06/22/21  1421 06/21/21  0913 06/19/21  1214 06/19/21  1214 06/19/21  1205 05/12/21  0928 05/11/21  0813 07/16/18  0740 07/16/18  0740 03/22/16  1028 03/22/16  1028   ALBUMIN 2.4* 2.4* 2.8*   < >  --  3.0* 2.8* 2.8*   < >  3.8   < >  --    BILITOTAL 13.0* 12.3* 12.2*   < >  --  8.8* 1.4* 1.4*   < > 0.8   < >  --    * 189* 284*   < >  --  395* 45 40   < > 28   < >  --    AST 59* 85* 136*   < >  --  179* 33 26   < > 22   < >  --    PROTEIN  --   --   --   --  Negative  --   --   --   --  Negative  --  Negative   LIPASE  --   --   --   --   --  329 254 1,753*   < >  --   --   --     < > = values in this interval not displayed.       I reviewed the patient's new imaging results.    All laboratory data reviewed  All imaging studies reviewed by me.    Dedra Zimmerman PA-C,  6/23/2021  [facility]  Office : [phone]  Cell: [phone] (Dr. Grant)  Cell: [phone] (Dedra Zimmerman PA-C)

## 2021-06-23 NOTE — PLAN OF CARE
DATE & TIME: 6/23 07-19      Cognitive Concerns/ Orientation : A/Ox4  BEHAVIOR & AGGRESSION TOOL COLOR: green  CIWA SCORE: NA  ABNL VS/O2: VSS on RA   MOBILITY: up independent   PAIN MANAGMENT: PRN has not needed  DIET: NPO at midnight forERCP today at 1600 with Dr. Grant and IR. .  BOWEL/BLADDER: continent   ABNL LAB/BG: biopsy positive for adenocarcinoma, Hbg 8.9  DRAIN/DEVICES: biliary drain with bloody output (Dr. Grant aware),now back to bile colored  TELEMETRY RHYTHM: NA   SKIN: intact, jaundiced   TESTS/PROCEDURES: pending GI/Onc plan. IR checked tube placement and no intervention needed. CT chest.  D/C DAY/GOALS/PLACE: pending   OTHER IMPORTANT INFO: New dx of cancer, hem/onc consult ordered and patient to be seen

## 2021-06-23 NOTE — CONSULTS
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    1. Duodenal adenocarcinoma  --Discussed with patient natural history of small bowel cancers, staging workup.   --At least T1a per extent of invasion seen on biopsy pathology.  --Will obtain CT chest, CA 19-9 and CEA level.  --Discussed surgical consult with patient -- she wishes to be seen at [facility] -- can arrange as outpatient if patient discharged after procedure.  --Discussed with patient role for chemotherapy depending on whether she is a surgical candidate or extent of disease and MMR status. She can follow-up with me outpatient.    2. Malignant biliary obstruction  --Patient to undergo repeat ERCP with stent placement inn conjunction with IR this afternoon.  --Follow bilirubin, LFTs.    3. Anemia  --Will check iron studies.  --Hgb 8.9 -- no indication for transfusion.    Thank you for the consult.  Will follow.    Nohemi Deng MD  Hematology/Oncology  [facility]                 Chief Complaint:   Pruritis, Jaundice, and Abdominal Pain           History of Present Illness:   This patient is a 65 year old female with hypertension, cholecystectomy on 5/11/2021, who presents with jaundice, upper abdominal pain, and pruritus.  Abdominal pain worsens after eating.  She denies nausea, emesis, fevers, chills, dyspnea, hematochezia or melena.    6/19/2021 Admission labs showed total bilirubin elevated at 8.8, alkaline phosphatase 309, , , direct bilirubin 7; lipase normal. Hgb 10.6, MCV 88, WBC 5.1, platelets 256,000. CBC prior to cholecystectomy in 5/2021  showed normal Hgb 13.5.    2021 CT abdomen/pelvis showed intra and extrahepatic biliary ductal dilation extending to ampulla; mild left hydronephrosis with left nephrolithiasis.    2021 Patient underwent ERCP with duodenal biopsy, which showed moderate to poorly differentiated adenocarcinoma, invading lamina propria, fragments of tubulovillous adenoma; ampulla biopsy negative for malignancy.    2021 Patient underwent IR placement of internal/external biliary drainage catheter.    2021 Total bilirubin 13, alk phos 219, , AST 59.    Patient reports intermittent epigastric pain and bloating sensation.         Physical Exam:   Vitals were reviewed  Blood pressure 122/68, pulse 53, temperature 98.3  F (36.8  C), temperature source Oral, resp. rate 16, height 1.524 m (5'), weight 81.6 kg (180 lb), SpO2 96 %, not currently breastfeeding.  Temperatures:  Current - Temp: 98.3  F (36.8  C); Max - Temp  Av.1  F (36.7  C)  Min: 97.6  F (36.4  C)  Max: 98.3  F (36.8  C)  Respiration range: Resp  Av.1  Min: 16  Max: 20  Pulse range: Pulse  Av.1  Min: 49  Max: 63  Blood pressure range: Systolic (24hrs), Av , Min:112 , Max:134   ; Diastolic (24hrs), Av, Min:62, Max:72    Pulse oximetry range: SpO2  Av.6 %  Min: 94 %  Max: 98 %    Intake/Output Summary (Last 24 hours) at 2021 1438  Last data filed at 2021 0800  Gross per 24 hour   Intake 325 ml   Output 300 ml   Net 25 ml       GENERAL: No acute distress.  SKIN: Jaundice present; no rashes.  HEENT: Normocephalic, atraumatic. Scleral icterus present. Oropharynx is grossly clear.  LYMPH: No palpable lymphadenopathy in the cervical, supraclavicular regions.  HEART: Regular rate and rhythm with no murmurs.  LUNGS: Clear bilaterally.  ABDOMEN: Soft, nontender, nondistended with no palpable hepatosplenomegaly.  EXTREMITIES: No clubbing, cyanosis; 1+ symmetric bilateral lower extremity edema.  MENTAL: Alert and oriented to  person, place, and time.              Past Medical History:   I have reviewed this patient's past medical history  Past Medical History:   Diagnosis Date     Essential hypertension 11/25/2016     Hyperlipidemia     elevated triglycerides     Hyperparathyroidism (H) 06/2014    referred to endo when PTH/calcium still high 11/14 and 12/14. seeing Dr. leos. dexa wtih penia at hips and porosis wrist. disc. surgery 12/14     Hypertension      Morbid obesity (H)      Osteoporosis     osteopenia at hips and osteoporosis at wrist according to dexa done through endo referral     Osteoporosis, senile 11/25/2016     Renal stones      Situational depression      Sleep apnea 2009    Doesn't use a CPAP             Past Surgical History:   I have reviewed this patient's past surgical history  Past Surgical History:   Procedure Laterality Date     BIOPSY BREAST  11/2001, 07/07/2014    excisional left side-B9 fibrocystic change. stereotactic bx left at 2 oclock for calcs. path-atypical ductal proliferation     COLONOSCOPY N/A 1/19/2017    Procedure: COMBINED COLONOSCOPY, SINGLE OR MULTIPLE BIOPSY/POLYPECTOMY BY BIOPSY;  Surgeon: Jerald Grant MD;  Location:  GI     ENDOSCOPIC RETROGRADE CHOLANGIOPANCREATOGRAM N/A 6/20/2021    Procedure: ENDOSCOPIC RETROGRADE CHOLANGIOPANCREATOGRAPHY, WITH SPHINCTEROTOMY AND BIOPSY and Endoscopic Ultrasound;  Surgeon: Jerald Grant MD;  Location:  OR     ENDOSCOPIC RETROGRADE CHOLANGIOPANCREATOGRAM N/A 6/22/2021    Procedure: ENDOSCOPIC RETROGRADE CHOLANGIOPANCREATOGRAPHY and Sphincterotomy;  Surgeon: Jerald Grant MD;  Location:  OR     IR BILIARY DRAIN PLACEMENT  6/22/2021     LAPAROSCOPIC CHOLECYSTECTOMY WITH CHOLANGIOGRAMS N/A 5/11/2021    Procedure: CHOLECYSTECTOMY, LAPAROSCOPIC, WITH CHOLANGIOGRAM;  Surgeon: Gretchen Duenas MD;  Location:  OR     LITHOTRIPSY  2007     MAMMOPLASTY REDUCTION  11/07/2014    Dr. Trujillo and Reji. had atypical cells noted on a bx  but also wanted reduction so done together.     PARATHYROIDECTOMY Right 8/21/2015    Procedure: PARATHYROIDECTOMY;  Surgeon: Lynnette Sewell MD;  Location: [facility] OR     TUBAL LIGATION  12/14/1994    post partum               Social History:   I have reviewed this patient's social history  Social History     Tobacco Use     Smoking status: Never Smoker     Smokeless tobacco: Never Used   Substance Use Topics     Alcohol use: Yes     Frequency: Monthly or less     Drinks per session: 1 or 2     Binge frequency: Never             Family History:   I have reviewed this patient's family history  Family History   Problem Relation Age of Onset     Hypertension Mother      Cerebrovascular Disease Mother      Diabetes Mother      Hyperlipidemia Mother      Coronary Artery Disease Mother      Colon Cancer Father 90     Other Cancer Maternal Grandmother      Breast Cancer Maternal Grandmother      Cerebrovascular Disease Paternal Grandmother      No Known Problems Sister      No Known Problems Brother      No Known Problems Maternal Grandfather      No Known Problems Other              Allergies:   No Known Allergies          Medications:   I have reviewed this patient's current medications  Medications Prior to Admission   Medication Sig Dispense Refill Last Dose     CALCIUM PO Take 1 tablet by mouth daily   6/19/2021 at Unknown time     diphenhydrAMINE (BENADRYL) 25 MG tablet Take 50 mg by mouth nightly as needed for itching or allergies   6/18/2021 at Unknown time     IRON PO Take 1 tablet by mouth daily    6/19/2021 at Unknown time     lisdexamfetamine (VYVANSE) 50 MG capsule Take 1 capsule (50 mg) by mouth every morning 90 capsule 0 6/19/2021 at Unknown time     lisinopril (ZESTRIL) 20 MG tablet Take 1 tablet (20 mg) by mouth daily 30 tablet 0 6/19/2021 at Unknown time     omeprazole (PRILOSEC) 20 MG DR capsule Take 20 mg by mouth daily   6/19/2021 at Unknown time     vitamin D3 (CHOLECALCIFEROL) 50 mcg (2000 units)  tablet Take 1 tablet by mouth daily   6/19/2021 at Unknown time     Current Facility-Administered Medications Ordered in Epic   Medication Dose Route Frequency Last Rate Last Admin     0.9% sodium chloride + KCl 20 mEq/L infusion   Intravenous Continuous 100 mL/hr at 06/23/21 0805 New Bag at 06/23/21 0805     bisacodyl (DULCOLAX) Suppository 10 mg  10 mg Rectal Daily PRN         diphenhydrAMINE (BENADRYL) capsule 25-50 mg  25-50 mg Oral Q6H PRN   50 mg at 06/23/21 0418     HYDROmorphone (PF) (DILAUDID) injection 0.3-0.5 mg  0.3-0.5 mg Intravenous Q3H PRN   0.5 mg at 06/22/21 2224     lidocaine (LMX4) cream   Topical Q1H PRN         lidocaine 1 % 0.1-1 mL  0.1-1 mL Other Q1H PRN         lisinopril (ZESTRIL) tablet 5 mg  5 mg Oral Daily   5 mg at 06/23/21 0922     May continue current IV fluid if patient has IV fluids infusing until discharge.   Does not apply Continuous PRN         melatonin tablet 1 mg  1 mg Oral At Bedtime PRN         naloxone (NARCAN) injection 0.2 mg  0.2 mg Intravenous Q2 Min PRN        Or     naloxone (NARCAN) injection 0.4 mg  0.4 mg Intravenous Q2 Min PRN        Or     naloxone (NARCAN) injection 0.2 mg  0.2 mg Intramuscular Q2 Min PRN        Or     naloxone (NARCAN) injection 0.4 mg  0.4 mg Intramuscular Q2 Min PRN         omeprazole (priLOSEC) CR capsule 20 mg  20 mg Oral Daily   20 mg at 06/23/21 0922     ondansetron (ZOFRAN-ODT) ODT tab 4 mg  4 mg Oral Q6H PRN        Or     ondansetron (ZOFRAN) injection 4 mg  4 mg Intravenous Q6H PRN   4 mg at 06/20/21 1618     oxyCODONE (ROXICODONE) tablet 5 mg  5 mg Oral Q4H PRN   5 mg at 06/23/21 0502     piperacillin-tazobactam (ZOSYN) 3.375 g vial to attach to  mL bag  3.375 g Intravenous Q6H 200 mL/hr at 06/21/21 1618 3.375 g at 06/23/21 0922     polyethylene glycol (MIRALAX) Packet 17 g  17 g Oral Daily PRN         prochlorperazine (COMPAZINE) injection 10 mg  10 mg Intravenous Q6H PRN        Or     prochlorperazine (COMPAZINE) tablet 10 mg   10 mg Oral Q6H PRN        Or     prochlorperazine (COMPAZINE) suppository 25 mg  25 mg Rectal Q12H PRN         senna-docusate (SENOKOT-S/PERICOLACE) 8.6-50 MG per tablet 1 tablet  1 tablet Oral BID PRN        Or     senna-docusate (SENOKOT-S/PERICOLACE) 8.6-50 MG per tablet 2 tablet  2 tablet Oral BID PRN         sodium chloride (PF) 0.9% PF flush 3 mL  3 mL Intravenous q1 min prn         sodium chloride (PF) 0.9% PF flush 3 mL  3 mL Intracatheter Q8H   3 mL at 06/19/21 1804     sodium chloride (PF) 0.9% PF flush 3 mL  3 mL Intracatheter q1 min prn         No current Epic-ordered outpatient medications on file.             Review of Systems:     The 14 point Review of Systems is negative other than noted in the HPI.            Data:   All laboratory data reviewed  Results for orders placed or performed during the hospital encounter of 06/19/21 (from the past 24 hour(s))   ENDOSCOPIC RETROGRADE CHOLANGIOPANCREATOGRAPHY   Result Value Ref Range    ERCP       [facility]  _______________________________________________________________________________  Patient Name: Ana Wesleymanuel       Procedure Date: 6/22/2021 4:12 PM  MRN: [id number]                       Account Number: [id number]  YOB: 1955               Admit Type: Inpatient  Age: 65                               Room: OR  Note Status: Finalized                Attending MD: Jerald Grant MD  Total Sedation Time:                  Instrument Name: 407 [id number]-Q180V ERCP  _______________________________________________________________________________     Procedure:                ERCP  Indications:              Jaundice  Providers:                Jerald Grant MD, Dang Sanders RN  Referring MD:               Medicines:                Monitored Anesthesia Care  Complications:            No immediate complications.  _______________________________________________________________________________  Procedure:                 Pre-An esthesia Assessment:                            - Prior to the procedure, a History and Physical                             was performed, and patient medications and                             allergies were reviewed. The patient is competent.                             The risks and benefits of the procedure and the                             sedation options and risks were discussed with the                             patient. All questions were answered and informed                             consent was obtained. Patient identification and                             proposed procedure were verified by the physician                             in the pre-procedure area. Mental Status                             Examination: alert and oriented. Airway                             Examination: normal oropharyngeal airway and neck                             mobility. Respiratory Examination: clear to                             auscultation. CV Examination: normal. Prophylactic                              Antibiotics: The patient does not require                             prophylactic antibiotics. Prior Anticoagulants: The                             patient has taken no previous anticoagulant or                             antiplatelet agents. ASA Grade Assessment: II - A                             patient with mild systemic disease. After reviewing                             the risks and benefits, the patient was deemed in                             satisfactory condition to undergo the procedure.                             The anesthesia plan was to use moderate sedation /                             analgesia (conscious sedation). Immediately prior                             to administration of medications, the patient was                             re-assessed for adequacy to receive sedatives. The                             heart rate, respiratory rate, oxygen  saturations,                             blood pressure, adequacy of pulmonary savanna tilation,                             and response to care were monitored throughout the                             procedure. The physical status of the patient was                             re-assessed after the procedure.                            After obtaining informed consent, the scope was                             passed under direct vision. Throughout the                             procedure, the patient's blood pressure, pulse, and                             oxygen saturations were monitored continuously. The                             Duodenoscope was introduced through the mouth, and                             used to inject contrast into and used to locate the                             major papilla. The ERCP was accomplished without                             difficulty. The patient tolerated the procedure                             well.                                                                                   Findings:       A biliary drain was vi sible on the  film. The esophagus was        successfully intubated under direct vision without detailed examination        of the pharynx, larynx, and associated structures, and upper GI tract.        The upper GI tract was grossly normal upto second part of duodebu,        Previous sphincterotomy site was inflammed. Significant duodenal lumen        deformity wad noticed. No Biliary drain seen in the duodenum. CBD        canulation was attempted. Contras was also injected through the drain        percutaneously. Howerev no contrast flow seen extending into duodenum.        CBD and biliry syetem was seen with flouro.                                                                                   Impression:               - Duodenal tumor, likely malignant. This was not                             treated.  Recommendation:           - Return  patient to hospital [facility].                                                                                   Procedure Code(s):       --- Pr ofessional ---       10677, Esophagogastroduodenoscopy, flexible, transoral; diagnostic,        including collection of specimen(s) by brushing or washing, when        performed (separate procedure)  Diagnosis Code(s):       --- Professional ---       R17, Unspecified jaundice    CPT copyright 2019 [employer]. All rights reserved.    The codes documented in this report are preliminary and upon  review may   be revised to meet current compliance requirements.    Electronically Signed by Jerald Whitman  ___________________  Jerald Grant MD  6/22/2021 9:17:24 PM  I was physically present for the entire viewing portion of the exam.  Jerald Grant MD  Number of Addenda: 0    Note Initiated On: 6/22/2021 4:12 PM  MRN:                      [id number]  Procedure Date:           6/22/2021 4:12:41 PM  Total Procedure Duration: 0 hours 23 minutes 24 seconds   Estimated Blood Loss:       Estimated blood loss: none.  Scope In: 4:54:14 PM  Scope Out: 5:17:38 PM     Hemoglobin (Every 6 Hrs)   Result Value Ref Range    Hemoglobin 9.1 (L) 11.7 - 15.7 g/dL   CBC with platelets   Result Value Ref Range    WBC 6.4 4.0 - 11.0 10e9/L    RBC Count 3.16 (L) 3.8 - 5.2 10e12/L    Hemoglobin 8.8 (L) 11.7 - 15.7 g/dL    Hematocrit 28.2 (L) 35.0 - 47.0 %    MCV 89 78 - 100 fl    MCH 27.8 26.5 - 33.0 pg    MCHC 31.2 (L) 31.5 - 36.5 g/dL    RDW 17.2 (H) 10.0 - 15.0 %    Platelet Count 248 150 - 450 10e9/L   Comprehensive metabolic panel   Result Value Ref Range    Sodium 137 133 - 144 mmol/L    Potassium 4.0 3.4 - 5.3 mmol/L    Chloride 107 94 - 109 mmol/L    Carbon Dioxide 25 20 - 32 mmol/L    Anion Gap 5 3 - 14 mmol/L    Glucose 106 (H) 70 - 99 mg/dL    Urea Nitrogen 9 7 - 30 mg/dL    Creatinine 0.66 0.52 - 1.04 mg/dL    GFR Estimate >90 >60 mL/min/[1.73_m2]    GFR Estimate If  Black >90 >60 mL/min/[1.73_m2]    Calcium 8.5 8.5 - 10.1 mg/dL    Bilirubin Total 13.0 (H) 0.2 - 1.3 mg/dL    Albumin 2.4 (L) 3.4 - 5.0 g/dL    Protein Total 6.0 (L) 6.8 - 8.8 g/dL    Alkaline Phosphatase 219 (H) 40 - 150 U/L     (H) 0 - 50 U/L    AST 59 (H) 0 - 45 U/L   CRP inflammation   Result Value Ref Range    CRP Inflammation 35.1 (H) 0.0 - 8.0 mg/L   CK total   Result Value Ref Range    CK Total 33 30 - 225 U/L   Hemoglobin (Every 6 Hrs)   Result Value Ref Range    Hemoglobin 8.9 (L) 11.7 - 15.7 g/dL

## 2021-06-23 NOTE — PROGRESS NOTES
ERCP: ERCP procedure was attempted previously attempted sphincterotomy site was identified duodenal in mass which was extending to ampulla was also identified significant duodenal luminal deformity due to mass was noticed no biliary drain was seen in the duodenum contrast was injected through the drain no obvious drain flow and contrast noticed in the lumen of the duodenum CBD cannulation was attempted again however procedure was unsuccessful.  Plan to start patient on full liquid to soft diet tonight n.p.o. after midnight.  Patient's biopsy results were reviewed with patient and family.  Patient biopsy from duodenal mass are positive for adenocarcinoma which is moderate to poorly differentiated arising from villous adenoma.  Plan to review with IR tomorrow.  We will also recommend further evaluation in oncology.  Given patient's smaller size of the lesion which is well localized into the duodenum and also distal common bile duct I do believe patient will be a good candidate for surgical intervention and possible Whipple surgery.    Jerald Grant MD

## 2021-06-23 NOTE — IR NOTE
Interventional Radiology Intra-procedural Nursing Note    Patient Name: Ana Dupree  Medical Record Number: [id number]  Today's Date: June 23, 2021    Start Time: 1601  End of procedure time: 1604  Procedure: Biliary tube check  Report given to: Clara Bass RN  Time pt departs:  1613  : murray Willett RN

## 2021-06-24 VITALS
RESPIRATION RATE: 16 BRPM | BODY MASS INDEX: 35.34 KG/M2 | HEART RATE: 49 BPM | WEIGHT: 180 LBS | TEMPERATURE: 98.4 F | OXYGEN SATURATION: 97 % | SYSTOLIC BLOOD PRESSURE: 128 MMHG | HEIGHT: 60 IN | DIASTOLIC BLOOD PRESSURE: 74 MMHG

## 2021-06-24 DIAGNOSIS — K83.1 BILIARY OBSTRUCTION (H): Primary | ICD-10-CM

## 2021-06-24 LAB
ALBUMIN SERPL-MCNC: 2.4 G/DL (ref 3.4–5)
ALP SERPL-CCNC: 211 U/L (ref 40–150)
ALT SERPL W P-5'-P-CCNC: 119 U/L (ref 0–50)
ANION GAP SERPL CALCULATED.3IONS-SCNC: 1 MMOL/L (ref 3–14)
AST SERPL W P-5'-P-CCNC: 40 U/L (ref 0–45)
BILIRUB DIRECT SERPL-MCNC: 10.2 MG/DL (ref 0–0.2)
BILIRUB SERPL-MCNC: 12.4 MG/DL (ref 0.2–1.3)
BUN SERPL-MCNC: 10 MG/DL (ref 7–30)
CALCIUM SERPL-MCNC: 8.6 MG/DL (ref 8.5–10.1)
CHLORIDE SERPL-SCNC: 106 MMOL/L (ref 94–109)
CO2 SERPL-SCNC: 31 MMOL/L (ref 20–32)
CREAT SERPL-MCNC: 0.58 MG/DL (ref 0.52–1.04)
ERYTHROCYTE [DISTWIDTH] IN BLOOD BY AUTOMATED COUNT: 17.9 % (ref 10–15)
GFR SERPL CREATININE-BSD FRML MDRD: >90 ML/MIN/{1.73_M2}
GGT SERPL-CCNC: 276 U/L (ref 0–40)
GLUCOSE SERPL-MCNC: 103 MG/DL (ref 70–99)
HCT VFR BLD AUTO: 27.9 % (ref 35–47)
HGB BLD-MCNC: 8.8 G/DL (ref 11.7–15.7)
MCH RBC QN AUTO: 28.3 PG (ref 26.5–33)
MCHC RBC AUTO-ENTMCNC: 31.5 G/DL (ref 31.5–36.5)
MCV RBC AUTO: 90 FL (ref 78–100)
PLATELET # BLD AUTO: 269 10E9/L (ref 150–450)
POTASSIUM SERPL-SCNC: 3.6 MMOL/L (ref 3.4–5.3)
PROT SERPL-MCNC: 6.3 G/DL (ref 6.8–8.8)
RBC # BLD AUTO: 3.11 10E12/L (ref 3.8–5.2)
SODIUM SERPL-SCNC: 138 MMOL/L (ref 133–144)
WBC # BLD AUTO: 6.2 10E9/L (ref 4–11)

## 2021-06-24 PROCEDURE — 250N000013 HC RX MED GY IP 250 OP 250 PS 637: Performed by: INTERNAL MEDICINE

## 2021-06-24 PROCEDURE — 82248 BILIRUBIN DIRECT: CPT | Performed by: HOSPITALIST

## 2021-06-24 PROCEDURE — 250N000011 HC RX IP 250 OP 636: Performed by: INTERNAL MEDICINE

## 2021-06-24 PROCEDURE — 99239 HOSP IP/OBS DSCHRG MGMT >30: CPT | Performed by: HOSPITALIST

## 2021-06-24 PROCEDURE — 250N000013 HC RX MED GY IP 250 OP 250 PS 637: Performed by: HOSPITALIST

## 2021-06-24 PROCEDURE — 36415 COLL VENOUS BLD VENIPUNCTURE: CPT | Performed by: HOSPITALIST

## 2021-06-24 PROCEDURE — 80053 COMPREHEN METABOLIC PANEL: CPT | Performed by: HOSPITALIST

## 2021-06-24 PROCEDURE — 82977 ASSAY OF GGT: CPT | Performed by: HOSPITALIST

## 2021-06-24 PROCEDURE — 85027 COMPLETE CBC AUTOMATED: CPT | Performed by: HOSPITALIST

## 2021-06-24 RX ORDER — OXYCODONE HYDROCHLORIDE 5 MG/1
2.5-5 TABLET ORAL EVERY 6 HOURS PRN
Qty: 12 TABLET | Refills: 0 | Status: SHIPPED | OUTPATIENT
Start: 2021-06-24 | End: 2021-07-23

## 2021-06-24 RX ORDER — OXYCODONE HYDROCHLORIDE 5 MG/1
2.5-5 TABLET ORAL EVERY 6 HOURS PRN
Qty: 12 TABLET | Refills: 0 | Status: SHIPPED | OUTPATIENT
Start: 2021-06-24 | End: 2021-06-24

## 2021-06-24 RX ORDER — LISDEXAMFETAMINE DIMESYLATE 50 MG/1
50 CAPSULE ORAL EVERY MORNING
Qty: 1 CAPSULE | Refills: 0 | COMMUNITY
Start: 2021-06-24 | End: 2022-01-19

## 2021-06-24 RX ORDER — LISINOPRIL 5 MG/1
5 TABLET ORAL DAILY
Qty: 30 TABLET | Refills: 0 | Status: SHIPPED | OUTPATIENT
Start: 2021-06-24 | End: 2022-01-19

## 2021-06-24 RX ADMIN — PIPERACILLIN SODIUM AND TAZOBACTAM SODIUM 3.38 G: 3; .375 INJECTION, POWDER, LYOPHILIZED, FOR SOLUTION INTRAVENOUS at 10:51

## 2021-06-24 RX ADMIN — LISINOPRIL 5 MG: 5 TABLET ORAL at 08:07

## 2021-06-24 RX ADMIN — OXYCODONE HYDROCHLORIDE 5 MG: 5 TABLET ORAL at 04:25

## 2021-06-24 RX ADMIN — OMEPRAZOLE 20 MG: 20 CAPSULE, DELAYED RELEASE ORAL at 08:07

## 2021-06-24 RX ADMIN — PIPERACILLIN SODIUM AND TAZOBACTAM SODIUM 3.38 G: 3; .375 INJECTION, POWDER, LYOPHILIZED, FOR SOLUTION INTRAVENOUS at 04:12

## 2021-06-24 ASSESSMENT — ACTIVITIES OF DAILY LIVING (ADL)
ADLS_ACUITY_SCORE: 14

## 2021-06-24 NOTE — PROVIDER NOTIFICATION
MD Notification    Notified Person: MD    Notified Person Name: Troy    Notification Date/Time: 6/23/21, 2128    Notification Interaction: amcom page    Purpose of Notification: Pt requesting to discontinue IV fluids. Pt is tolerating a regular diet. Can we stop?    Orders Received: Discontinue IV fluids    Comments:

## 2021-06-24 NOTE — PLAN OF CARE
"Discharge    Patient discharged to home via private transportation with her daughters.    Care plan note: Hr bradycardic at times to high-40's-50's; otherwise VSS; O2sats 90's RA.  Patient c/o constant left abdominal pain but declined intervention stating \"It hurts but I can tolerate it.\" Biliary drain is patent and draining greenish output. Site is c/d/I. Lap armani incisions are c/d/I as well.  Hgb 8.8. No s/sx of bleeding noted. BIli, ALT/AST are improving. Patient will be following-up with Dr. Simmons at [facility] for biliary surgery.    Listed belongings gathered and returned to patient. Yes  Belongings returned to patient from security/pharmacy NA  Care Plan and Patient education resolved: Yes  Prescriptions if needed, hard copies sent with patient  NA  Home and hospital acquired medications returned to patient: NA  Medication Bin checked and emptied on discharge Yes  Follow up appointment made for patient: No - patient has a virtual appointment with Dr. Simmons tomorrow.    "

## 2021-06-24 NOTE — PLAN OF CARE
DATE & TIME: 6/23 1900-0730      Cognitive Concerns/ Orientation : A/Ox4  BEHAVIOR & AGGRESSION TOOL COLOR: green  CIWA SCORE: NA  ABNL VS/O2: VSS on RA   MOBILITY: up independent   PAIN MANAGMENT: Dilaudid x1, Oxy x1 adb pain  DIET: Regular, tolerating  BOWEL/BLADDER: continent   ABNL LAB/BG: biopsy positive for adenocarcinoma, Hbg 8.9  DRAIN/DEVICES: biliary drain with mix of  bloody-bile output (Dr. Grant aware)  TELEMETRY RHYTHM: NA   SKIN: intact, jaundiced   TESTS/PROCEDURES: pending GI/Onc plan. IR checked tube placement and no intervention needed. CT chest.  D/C DAY/GOALS/PLACE: pending   OTHER IMPORTANT INFO: New dx of cancer, hem/onc consult ordered and patient to be seen

## 2021-06-24 NOTE — DISCHARGE SUMMARY
Discharge Summary  Hospitalist    Date of Admission:  6/19/2021  Date of Discharge:  6/24/2021  Discharging Provider: Kacey Sheikh MD    Primary Care Physician   Oliva Lovell  Primary Care Provider Phone Number: [phone]  Primary Care Provider Fax Number: [phone]    PRINCIPAL DIAGNOSIS  Duodenal adenocarcinoma.  Biliary obstruction with jaundice.  Left nephrolithiasis with mild left hydronephrosis  Bacteriuria with cultures negative.  Hypoalbuminemia from underlying malignancy, poor oral intake, dilutional.  Peripheral edema from hypoalbuminemia and iatrogenic fluid resuscitation.  Incidental CT finding.  Hypokalemia from poor oral intake, dilutional -replaced.    Past Medical History:   Diagnosis Date     Essential hypertension 11/25/2016     Hyperlipidemia     elevated triglycerides     Hyperparathyroidism (H) 06/2014    referred to endo when PTH/calcium still high 11/14 and 12/14. seeing Dr. leos. dexa wtih penia at hips and porosis wrist. disc. surgery 12/14     Hypertension      Morbid obesity (H)      Osteoporosis     osteopenia at hips and osteoporosis at wrist according to dexa done through endo referral     Osteoporosis, senile 11/25/2016     Renal stones      Situational depression      Sleep apnea 2009    Doesn't use a CPAP       History of Present Illness   Ana Dupree is an 65 year old female who presented with abdominal pain     Hospital Course     Ana Dupree is  a 65 year old female with HTN, GERD, KINGS, obesity admitted on 6/19/2021  with persisting epigastric and post-prandial pain and jaundice. Recent hospitalization 5/10-5/12/21 for gallstone pancreatitis and possible cholecystitis s/p lap armani with intra-op cholangiogram on 5/11.     Duodenal adenocarcinoma.  Biliary obstruction with jaundice.  Persisting epigastric, post-prandial pain and distension following discharge, now with recent onset jaundice/icterus.  Admission LFT's consistent with obstruction, lipase  wnl.    CT abd/pelvis showing intra- and extra-hepatic biliary ductal dilatation with CBD measuring 1.5cm with no identifiable cause for obstruction.  s/p EUS/ERCP on 6/20- reported as acquired malignant-appearing, intrinsic moderate stenosis with abnormal polypoid mucosa was found in the third portion of the duodenum and was traversed.abnormality.  Underwent internal/external biliary drain placement by IR on 6/22.  Repeat ERCP 6/22  duodenal tumor likely malignant, not treated.  Biopsy from small intestine, duodenum third portion showed moderate to poorly differentiated adenocarcinoma, fragments of tubulovillous adenoma.  IR did a biliary tube check 6/23 and it appeared in the same position with contrast flowing into the duodenum.  Followed by [facility] gastroenterology recommend to follow-up with biliary surgery at [facility]. 7 days of oral antibiotics on discharge, continue drain at time of discharge.  Followed by IR, continue drain care.  Followed by [facility] oncology during hospitalization, cancer antigen 19 9 pending.  Follow-up with oncology in clinic.  Was on IV zosyn [6/20 -6/24], preliminary cultures from the biliary fluid negative. Switch to oral Augmentin for 7 days on discharge.  Check CBC, CMP in 1 week or earlier if symptomatic.  Low-fat diet, small frequent meals.  Avoid hepatotoxic drugs including Tylenol.  Prescription for oxycodone as needed for moderate to severe pain provided.     Left nephrolithiasis with mild left hydronephrosis  Bacteriuria with cultures negative.  Afebrile, no leukocytosis.  Creatinine within normal limits.  Incidental finding on CT.    UA with small blood, large leukocyte esterase, WBCs and few bacteria.  Urine cultures negative so far.    On antibiotics as above.  Follow up with Urology outpatient in 2 to 4 weeks or earlier if symptomatic.    Hypoalbuminemia from underlying malignancy, poor oral intake, dilutional.  Peripheral edema from hypoalbuminemia and iatrogenic fluid  resuscitation.  Complaining of peripheral edema, no shortness of breath.  No hypoxia.  Hydrate as able to  Ambulate as able to.  Check liver enzymes on provider visit after discharge.    Incidental CT finding.  Follow-up incidental CT finding of suspected small left adrenal nodule as outpatient on PCP visit.    Hypokalemia from poor oral intake, dilutional -replaced.  Potassium 3.2 received replacement      HTN  During hospitalization decrease PTA dose of lisinopril from 20 mg to 5 mg oral daily.  Continue lisinopril 5 mg oral daily on discharge.  Monitor daily blood pressure readings, review on provider visit and optimize regimen.     GERD  Continue PTA omeprazole.     Obesity with a BMI of 35.5.  Hold PTA Vyvanse until follow-up by PCP.  Consider lifestyle modification with diet and exercise as able to.    Kacey Sheikh MD.    Pending Results   These results will be followed up by ordering provider   Unresulted Labs Ordered in the Past 30 Days of this Admission     Date and Time Order Name Status Description    6/23/2021 1511 Cancer antigen 19-9 In process     6/22/2021 0842 Fluid Culture Aerobic Bacterial Preliminary     6/22/2021 0842 Anaerobic bacterial culture Preliminary              Physical Exam   Vitals:    06/19/21 1123   Weight: 81.6 kg (180 lb)     Vital Signs with Ranges  Temp:  [98.1  F (36.7  C)-98.4  F (36.9  C)] 98.4  F (36.9  C)  Pulse:  [49-57] 49  Resp:  [16-25] 16  BP: (123-148)/(64-74) 128/74  SpO2:  [95 %-97 %] 97 %  I/O last 3 completed shifts:  In: -   Out: 150 [Drains:150]  PHYSICAL EXAM  GENERAL: Alert and oriented, yellowish discoloration of skin and eyes.  HEART: Regular rate and rhythm. S1S2. No murmurs  LUNGS: Bilateral slightly decreased breath sounds.  No wheezing or crackles.  Respirations unlabored  ABDOMEN: Soft, bowel sounds heard. Drain with fluid +   No guarding or rigidity.  No tenderness.  NEURO: Moving all extremities  EXTREMITIES: ankle edema +.  2+ peripheral  pulses.  SKIN: Warm, dry.  PSYCHIATRY  cooperative.    )Consultations This Hospital Stay   GASTROENTEROLOGY IP CONSULT  HEMATOLOGY & ONCOLOGY IP CONSULT    Time Spent on this Encounter   IKacey MD, personally saw the patient today and spent greater than 55 minutes discharging this patient. Discussed with patient, daughters by the bedside, gastroenterologist, transfer team at [facility], bedside RN.    Discharge Disposition   Discharged to home  Condition at discharge: Stable    Discharge Orders      Reason for your hospital stay    You were admitted to the hospital with abdominal pain, followed by [facility], interventional radiology, oncology.  Underwent ERCP, pathology positive for duodenal adenocarcinoma.  Continue  drain on discharge, dismissed on 7 days of Augmentin.  Needs close follow-up with [facility] biliary surgery team.     Activity    Your activity upon discharge: activity as tolerated     Monitor and record    Monitor home blood pressure readings, review on provider visit.  Optimize regimen.     Discharge Instructions    Continue drain care as recommended by interventional radiology.    Aggressive incentive spirometry.  Adequate oral hydration.  Ambulate as able to tolerate.  Avoid hepatotoxic drugs including Tylenol intake.  Consider lifestyle modification with diet and exercise as able to.     Follow-up and recommended labs and tests     Follow up with primary care provider, Oliva Lovell, within 7 days for hospital follow- up.  The following labs/tests are recommended: CMP, CBC, repeat urine analysis on PCP visit in 1 week.      Follow-up with surgical team at [facility] as outpatient as early as able to.  Follow-up with [facility] oncology as outpatient.    Consider follow-up with urology for incidental left nephrolithiasis with mild left hydronephrosis in 2 to 4 weeks.  Follow up incidental Suspected small left adrenal nodule as outpatient- PCP visit     Diet    Low-fat diet.  Small frequent meals.        Discharge Medications   Current Discharge Medication List      START taking these medications    Details   amoxicillin-clavulanate (AUGMENTIN) 875-125 MG tablet Take 1 tablet by mouth 2 times daily for 7 days  Qty: 14 tablet, Refills: 0    Associated Diagnoses: Biliary obstruction      oxyCODONE (ROXICODONE) 5 MG tablet Take 0.5-1 tablets (2.5-5 mg) by mouth every 6 hours as needed for moderate to severe pain  Qty: 12 tablet, Refills: 0    Associated Diagnoses: Biliary obstruction         CONTINUE these medications which have CHANGED    Details   lisdexamfetamine (VYVANSE) 50 MG capsule Take 1 capsule (50 mg) by mouth every morning Hold after discharge until follow-up visit with PCP.  Qty: 1 capsule, Refills: 0    Associated Diagnoses: Binge eating disorder; High risk medication use; BMI 38.0-38.9,adult      lisinopril (ZESTRIL) 5 MG tablet Take 1 tablet (5 mg) by mouth daily  Qty: 30 tablet, Refills: 0    Associated Diagnoses: Essential hypertension         CONTINUE these medications which have NOT CHANGED    Details   CALCIUM PO Take 1 tablet by mouth daily      diphenhydrAMINE (BENADRYL) 25 MG tablet Take 50 mg by mouth nightly as needed for itching or allergies      IRON PO Take 1 tablet by mouth daily       omeprazole (PRILOSEC) 20 MG DR capsule Take 20 mg by mouth daily      vitamin D3 (CHOLECALCIFEROL) 50 mcg (2000 units) tablet Take 1 tablet by mouth daily           Allergies   No Known Allergies    DATA  Most Recent 3 CBC's:  Recent Labs   Lab Test 06/24/21  0850 06/23/21  1241 06/23/21  0710 06/22/21  1421 06/22/21  1421   WBC 6.2  --  6.4  --  8.2   HGB 8.8* 8.9* 8.8*   < > 9.3*   MCV 90  --  89  --  88     --  248  --  228    < > = values in this interval not displayed.      Most Recent 3 BMP's:  Recent Labs   Lab Test 06/24/21  0850 06/23/21  0710 06/22/21  1421    137 135   POTASSIUM 3.6 4.0 3.6   CHLORIDE 106 107 106   CO2 31 25 24   BUN 10 9 5*   CR 0.58 0.66 0.58   ANIONGAP 1* 5 5    CRYS 8.6 8.5 8.4*   * 106* 72     Most Recent 2 LFT's:  Recent Labs   Lab Test 06/24/21  0850 06/23/21  0710   AST 40 59*   * 156*   ALKPHOS 211* 219*   BILITOTAL 12.4* 13.0*     Most Recent 6 Bacteria Isolates From Any Culture (See EPIC Reports for Culture Details):  Recent Labs   Lab Test 06/22/21  0847 06/19/21  1214 05/29/19  1628 07/16/18  0740 03/22/16  1108   CULT Culture negative monitoring continues  Culture negative monitoring continues No growth No beta hemolytic Streptococcus Group A isolated <10,000 colonies/mL  mixed urogenital kaylin   <10,000 colonies/mL urogenital kaylin       Results for orders placed or performed during the hospital encounter of 06/19/21   CT Abdomen Pelvis w Contrast    Narrative    CT ABDOMEN AND PELVIS WITH CONTRAST 6/19/2021 1:35 PM    CLINICAL HISTORY: Abdominal pain, biliary obstruction suspected  (Pediatric 0-18 years).    TECHNIQUE: CT scan of the abdomen and pelvis was performed following  injection of IV contrast. Multiplanar reformats were obtained. Dose  reduction techniques were used.  CONTRAST: 91mL Isovue-370    COMPARISON: 5/11/2021.    FINDINGS:   LOWER CHEST: Normal.    HEPATOBILIARY: There is intra and extrahepatic biliary ductal dilation  with the common hepatic duct measuring up to 1.5 cm. This appears new  compared to intraoperative cholangiogram dated 5/11/2021. No focal  liver lesions. Gallbladder is absent.    PANCREAS: Normal.    SPLEEN: Normal.    ADRENAL GLANDS: Normal.    KIDNEYS/BLADDER: Nonobstructing left nephrolithiasis with stones  measuring up to 9 mm. There is left hydronephrosis, but no  hydroureter. No ureteropelvic junction stone or mass demonstrated.  Right kidney appears normal.    BOWEL: There are a few colonic diverticuli, but no evidence of acute  diverticulitis. No bowel obstruction or inflammatory change.    PELVIC ORGANS: Normal.    ADDITIONAL FINDINGS: None.    MUSCULOSKELETAL: Unremarkable.      Impression     IMPRESSION:   1.  Intra and extrahepatic biliary ductal dilation extends to the  ampulla. No definite cause for obstruction is visualized, although CT  is relatively insensitive for choledocholithiasis. Consider MRCP.  2.  Mild left hydronephrosis with left nephrolithiasis. This may  represent chronic partial UPJ obstruction.    PATRICIO GRIFFITH MD   XR ERCP    Narrative    This exam was marked as non-reportable because it will not be read by a   radiologist or a [facility] non-radiologist provider.         IR Biliary Drain Placement    Narrative    [facility]  LOCATION: [facility]  DATE: 6/22/2021    PROCEDURE: PERCUTANEOUS CHOLECYSTOSTOMY TUBE PLACEMENT    PHYSICIAN: Remington Crenshaw MD     INDICATIONS: 65 years-old Female with biliary obstruction secondary to  an ampullary mass. The patient presents for percutaneous biliary tube  placement.     CONSENT: The risks, benefits and alternatives of the procedure were  discussed with the patient  in detail. All questions were answered.  Informed consent was given to proceed with the procedure.    MODERATE SEDATION: Moderate sedation was achieved with 200 mcg  fentanyl IV and 2 mg Versed IV. Under physician supervision, Versed  and fentanyl were administered for moderate sedation. Pulse oximetry,  heart rate and blood pressure were continuously monitored by an  independent trained observer. The physician spent 47 minutes of  face-to-face sedation time with the patient. The patient was already  receiving antibiotics.     FLUOROSCOPY TIME: 11.3 minutes.   RADIATION DOSE: Air Kerma: 194 mGy.    PROCEDURE:  The procedure, including the risks, benefits and  alternatives were discussed with the patient. After all of their  questions were answered, informed consent was obtained. The patient  was then brought to the Interventional Radiology suite, placed in the  supine position, and a timeout was performed. A limited preliminary  ultrasound demonstrated moderate  dilatation of the left hepatic bile  ducts with a midepigastric route to the ducts determined. The  patient's upper midline abdomen was then sterilely prepped and draped  using maximum sterile barrier technique. After giving local  anesthesia, a 21 gauge Chiba needle was advanced from a midline  approach into the left hepatic branch bile ducts, under ultrasound  guidance, with an ultrasound image recorded. The biliary system was  successfully entered. Thick clear bile was aspirated, with a sample  sent for culture & sensitivity. A small gentle contrast injection  confirmed positioning within the left bile duct itself.     A 0.018 inch nitinol wire was then advanced through the needle into  the common bile duct. Subsequently the needle was exchanged over the  wire for an AccuStick dilator system. The inner dilator and stiffener  were exchanged over the wire for a 0.035 inch angled Glidewire. The  outer dilator was then exchanged over the Glidewire for a 5 Citizen of [place] KMP  catheter. The Kampe catheter was advanced into the central common bile  duct. A cholangiogram was performed which demonstrated complete  occlusion of the distal bile duct at the level of the ampulla. With  manipulation a 0.035 inch Roadrunner guidewire was able to traverse  the occlusive segment and was advanced into the small bowel. The  catheter was able to be advanced over the Roadrunner wire. Contrast  injection confirmed intraluminal position within the small bowel. The  Roadrunner guidewire was exchanged for a 0.035 inch Amplatz wire.  Tract dilatation was performed using 8 and 10 Citizen of [place] dilators.  Following this a 10 Citizen of [place] internal/external biliary drainage catheter  was advanced over the Amplatz wire into the small bowel. The pigtail  was formed in the duodenum and locked. Contrast was injected  confirming proper position and patency of the catheter. The contrast  and bile with an aspirated. The catheter was secured to the skin at  the exit site  with a 2-0 suture and a sterile dressing applied. The  tube was left connected to a gravity drainage bag for gravity  drainage. The patient appeared to tolerate the procedure well without  complication.     FINDINGS: Contrast injection reveals moderate dilatation of the  intrahepatic bile ducts. Complete occlusion of the distal common bile  duct at the level of the ampulla was noted.      Impression    IMPRESSION:   Placement of a 10 Qatari internal/external biliary drainage catheter,  traversing an occlusive common bile duct at the level of the ampulla.    VALENTINA SARAVIA MD   XR ERCP    Narrative    EXAM: XR ERCP  DATE/TIME: 6/22/2021 5:29 PM    INDICATION: Sphincterotomy, fluoro time 1 minute 56 seconds, 3 images  COMPARISON: Biliary drain placement from 6/22/2021     FLUOROSCOPY TIME: 1.9 minutes  SPOT IMAGES OR CINE RUNS: 3    FINDINGS/    Impression    IMPRESSION: 3 spot fluoroscopic images demonstrate ERCP with  opacification of the extrahepatic and intrahepatic bile ducts.  Internal/external biliary drain is in place. For full details of the  procedure, please refer to the endoscopist report.    TI LITTLE MD   IR Biliary Tube Check    Narrative    INTERVENTIONAL RADIOLOGY BILIARY TUBE CHECK June 23, 2021 4:10 PM     HISTORY: The patient has a biliary obstruction from a mass in the  duodenum, biopsy proven to be diagnosed carcinoma. Patient had a  biliary tube placed yesterday. However, the pigtail in the duodenum  was difficult to visualize on endoscopy.    COMPARISON: Cholangiogram dated 6/22/2021.    FINDINGS: After obtaining informed consent, the patient was placed in  a supine position on the fluoroscopy table. A cholangiogram was  performed through existing biliary drain.    The drain is in good position. There is opacification of the  intrahepatic biliary tree as well as the duodenum. There is a  dissatisfactory aspiration of the drain. The intrahepatic biliary tree  is decompressed with drain  aspiration. Fluoroscopic images were saved.    Fluoroscopy time: 0.9 minutes.    Total fluoroscopy dose: 9.58 mGy Air Kerma.    Contrast: 10 mL Isovue.      Impression    IMPRESSION: Biliary drain check as above. It appears to be in  satisfactory position. The distal pigtail of the drainage catheter is  in the duodenum however, evaluation to determine whether it exits the  common bile duct through the ampulla cannot be performed on this exam.   CT Chest/Abdomen/Pelvis w Contrast    Narrative    CT CHEST/ABDOMEN/PELVIS W CONTRAST 6/23/2021 4:59 PM    CLINICAL HISTORY: small bowel adenocarcinoma, initial workup    TECHNIQUE: CT scan of the chest, abdomen, and pelvis was performed  following injection of IV contrast. Multiplanar reformats were  obtained. Dose reduction techniques were used.   CONTRAST: 91mL Isovue-370    COMPARISON: CT 6/19/2021    FINDINGS:   LUNGS AND PLEURA: Linear atelectasis in the lung bases. Trace pleural  effusions noted.    MEDIASTINUM/AXILLAE: Normal.    HEPATOBILIARY: There is fatty change of the liver. Status post  cholecystectomy. A percutaneous biliary tube terminates in the  duodenum. There is minimal bile duct dilatation in the lateral left  hepatic lobe. Otherwise no significant bile duct dilatation. There is  pneumobilia.    PANCREAS: Normal.    SPLEEN: Normal.    ADRENAL GLANDS: There is a suspected 12 mm x 11 mm left adrenal  nodule.    KIDNEYS/BLADDER: There are a few nonobstructing calculi at the lower  pole of left kidney which have not changed. Left hydronephrosis  extending to the ureteropelvic junction has not changed. No  hydroureter.    BOWEL: A 13 mm x 13 mm x 17 mm focus adjacent to the stomach and  proximal small bowel has not changed in retrospect (series 3 image  142).    PELVIC ORGANS: Normal.    ADDITIONAL FINDINGS: None.    MUSCULOSKELETAL: Disc and bony proliferation posterior to L4-L5 has  not changed.      Impression    IMPRESSION:  1.  Suspected small left  adrenal nodule.  2.  A soft tissue focus adjacent to the stomach and proximal small  bowel has not changed in retrospect (series 3 image 142). This could  represent a small focus of fat necrosis. Recommend attention on  follow-up imaging.  3.  Percutaneous biliary tube. Minimal bilateral dilatation in the  lateral left hepatic lobe.    LESTER J FAHRNER, MD

## 2021-06-24 NOTE — PROGRESS NOTES
Patient was evaluated independently.  Patient's current findings were discussed in detail.  Patient was evaluated further today with IR evaluation and evaluation of percutaneous drain.  It appears percutaneous drainage extending into the root duodenum through the tumor rather than from ampullary area.  Patient is draining biliary system patient's CBD is getting smaller.  Patient findings are consistent with circumferential obstructing duodenal lesion arising from villous adenoma just distal to ampulla between second and third part of duodenum lesion is about 3 cm long there is also invasion of ampulla and obstruction of biliary system patient is decompressed with percutaneous 10 Ni[place]an drain.  Patient's imaging studies otherwise have been unremarkable.  I will recommend the patient to be evaluated further for [facility] surgery and pancreaticobiliary surgery department.  Possible further evaluation by Dr. Saman Simmons was discussed with the patient and family.  Patient can be evaluated further either inpatient or outpatient.  Greatly appreciate further evaluation  by oncology team.  Patient is clinically doing well overall poor prognosis differential diagnosis and underlying current situation and percutaneous drain  with internal drainage were discussed and explained.  Findings also discussed in detail with interventional radiology team.  Patient's current status was explained to the family in detail and also discussed in detail with hospitalist team.  Continue on current treatment plan continue on IV antibiotics.  GI will continue to follow along closely    Jerald Grant MD

## 2021-06-24 NOTE — PROGRESS NOTES
[facility]  Gastroenterology Progress Note     Ana Dupree MRN# [id number]   YOB: 1955 Age: 65 year old          Assessment and Plan:     Jaundice    Biliary obstruction  Patient is clinically stable doing well. Patient's labs are trending downward patient's hemoglobin is stable no further significant bloody output in the drain. Patient's current medical records and imaging studies biopsy results all reviewed in detail with patient and family. Further discussions were done with pancreaticobiliary surgery team at [facility]. Plan to discharge patient home on oral antibiotic with percutaneous drain patient will be seen further at surgery department at [facility] for further evaluation and management. 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 Overall plan and findings discussed in detail.  More than 60 minutes were spent on phone call plan coordinating plan with hospitalist team and [place].  Patient can be discharged from GI standpoint I will recommend follow-up in GI clinic in 2 weeks after discharge depending on further progress.            Biliary obstruction  Jaundice  Binge eating disorder  High risk medication use  BMI 38.0-38.9,adult  Essential hypertension      Interval History:   doing well; no cp, sob, n/v/d, or abd pain.              Review of Systems:   C: NEGATIVE for fever, chills, change in weight  E/M: NEGATIVE for ear, mouth and throat problems  R: NEGATIVE for significant cough or SOB  CV: NEGATIVE for chest pain, palpitations or peripheral edema             Medications:   I have reviewed this patient's current medications    lisinopril  5 mg Oral Daily     omeprazole  20 mg Oral Daily     piperacillin-tazobactam  3.375 g Intravenous Q6H     sodium chloride (PF)  3 mL Intracatheter Q8H                  Physical Exam:   Vitals were reviewed  Vital Signs with Ranges  Temp:  [98.1   F (36.7  C)-98.4  F (36.9  C)] 98.4  F (36.9  C)  Pulse:  [49-57] 49  Resp:  [16-20] 16  BP: (124-148)/(64-74) 128/74  SpO2:  [96 %-97 %] 97 %  I/O last 3 completed shifts:  In: -   Out: 150 [Drains:150]  Constitutional: healthy, alert and no distress   Cardiovascular: negative, PMI normal. No lifts, heaves, or thrills. RRR. No murmurs, clicks gallops or rub  Respiratory: negative, Percussion normal. Good diaphragmatic excursion. Lungs clear  Head: Normocephalic. No masses, lesions, tenderness or abnormalities  Neck: Neck supple. No adenopathy. Thyroid symmetric, normal size,, Carotids without bruits.  Abdomen: Abdomen soft, non-tender. BS normal. No masses, organomegaly  SKIN: no suspicious lesions or rashes           Data:   I reviewed the patient's new clinical lab test results.   Recent Labs   Lab Test 06/24/21  0850 06/23/21  1241 06/23/21  0710 06/22/21  1421 06/22/21  1421 06/19/21  1205 06/19/21  1205   WBC 6.2  --  6.4  --  8.2   < > 5.1   HGB 8.8* 8.9* 8.8*   < > 9.3*   < > 10.6*   MCV 90  --  89  --  88   < > 88     --  248  --  228   < > 256   INR  --   --   --   --  0.93  --  1.00    < > = values in this interval not displayed.     Recent Labs   Lab Test 06/24/21  0850 06/23/21  0710 06/22/21  1421   POTASSIUM 3.6 4.0 3.6   CHLORIDE 106 107 106   CO2 31 25 24   BUN 10 9 5*   ANIONGAP 1* 5 5     Recent Labs   Lab Test 06/24/21  0850 06/23/21  0710 06/22/21  1421 06/19/21  1214 06/19/21  1214 06/19/21  1205 05/12/21  0928 05/11/21  0813 07/16/18  0740 07/16/18  0740 03/22/16  1028 03/22/16  1028   ALBUMIN 2.4* 2.4* 2.4*   < >  --  3.0* 2.8* 2.8*   < > 3.8   < >  --    BILITOTAL 12.4* 13.0* 12.3*   < >  --  8.8* 1.4* 1.4*   < > 0.8   < >  --    * 156* 189*   < >  --  395* 45 40   < > 28   < >  --    AST 40 59* 85*   < >  --  179* 33 26   < > 22   < >  --    PROTEIN  --   --   --   --  Negative  --   --   --   --  Negative  --  Negative   LIPASE  --   --   --   --   --  329 254 1,753*   < >   --   --   --     < > = values in this interval not displayed.       I reviewed the patient's new imaging results.    All laboratory data reviewed  All imaging studies reviewed by me.    Jerald Grant MD,  6/24/2021  [facility]  Office : [phone]  Cell: [phone]

## 2021-06-24 NOTE — PROGRESS NOTES
Interventional Radiology Progress Note:  Inpatient at [facility]  Date: 2021   Patient name: Ana Dupree  MRN:[id number]  :  1955    History: Ana Dupree is a 65 year old female with a PMH of HTN, KINGS, obesity and a recent history of cholecystectomy on 5/10 for gallstone pancreatitis, cholecystitis admitted on 2021 with jaundice, epigastric pain and itching. LFTs and total bilirubin were all abnormal. Imaging showed a dilated CBD and biliary ducts. On  she had a EUS and ERCP which noted duodenal stenosis, intramural mass in the ampulla, Biopsy was done of the duodenum but unable to cannulate into the bile duct to place an internal stent.     IR Dr Crenshaw placed a left biliary drain on  with some difficulty getting through the distal CBD which was occluded but ultimately was able to.  Another ERCP was done the same day but Dr Grant was unable to see the biliary drain in the duodenum and no stent was placed. There was some bleeding into the leg bag after and the bag was changed late morning with a stopcock added on . The tube flushed and aspirated without issues. Output was green after this. In the late afternoon after Ana got up to go to the bathroom the bag filled up with blood again.     IR did a biliary tube check  and it appeared in the same position with contrast flowing into the duodenum. It was felt that the mass interfered with anatomy internally so the drain was difficult to get to.     Lfts are decreasing some to normal levels but T bili has been slow to decrease until today and Ana appears less jaundiced. CT abdomen pelvis showed that the biliary and hepatic duct dilation has resolved so there has been good bile drainage. Stools are light colored and urine orange tinted still.     Ana is being seen in IR follow up today.      Interval History: Her two daughters are in the room and she has been told that she will discharge today.  Dr Grant discussed that it would be safest to keep the biliary drain in for now and Ana will follow up with Dr Saman Simmons at the  for a possible whipple procedure.     I reviewed biliary drain discharge instructions with Ana and her 2 daughters.     Physical Exam:   Temp:  [98.1  F (36.7  C)-98.4  F (36.9  C)] 98.4  F (36.9  C)  Pulse:  [49-57] 49  Resp:  [16-25] 16  BP: (123-148)/(64-74) 128/74  SpO2:  [95 %-97 %] 97 %    General/Neuro: Alert, oriented, pleasant mildly jaundiced woman in no acute distress.  Standing and walking in to the BR. Moves all extremities equally.  Abdomen: Soft, bloated, distended, mildly tender to palpation  Skin: Without excoriations, ecchymosis, erythema, lesions or open sores.    Biliary Drain:   -Dressing is C/D/I, changed to demonstrate dressing changes with daughters. Skin CD&I. Stitches intact   -Tube flushes and aspirates back easily with a total of 10 mL saline.   -Tube is draining green bilious fluid with some sediment.     Instructed the patient and her 2 daughters how to flush the drain once a day along with return demonstrations, empty MARGUERITE, change dressing. Answered all questions.     Labs:  ROUTINE ICU LABS (Last four results)  CMP  Recent Labs   Lab 06/24/21  0850 06/23/21  0710 06/22/21  1421 06/21/21  0913    137 135 136   POTASSIUM 3.6 4.0 3.6 3.2*   CHLORIDE 106 107 106 105   CO2 31 25 24 25   ANIONGAP 1* 5 5 6   * 106* 72 74   BUN 10 9 5* 7   CR 0.58 0.66 0.58 0.65   GFRESTIMATED >90 >90 >90 >90   GFRESTBLACK >90 >90 >90 >90   CRYS 8.6 8.5 8.4* 9.0   PROTTOTAL 6.3* 6.0* 6.0* 6.9   ALBUMIN 2.4* 2.4* 2.4* 2.8*   BILITOTAL 12.4* 13.0* 12.3* 12.2*   ALKPHOS 211* 219* 228* 278*   AST 40 59* 85* 136*   * 156* 189* 284*     CBC  Recent Labs   Lab 06/24/21  0850 06/23/21  1241 06/23/21  0710 06/22/21  2346 06/22/21  1421 06/21/21  0913   WBC 6.2  --  6.4  --  8.2 6.0   RBC 3.11*  --  3.16*  --  3.29* 3.64*   HGB 8.8* 8.9* 8.8* 9.1* 9.3* 10.3*   HCT  27.9*  --  28.2*  --  29.1* 32.5*   MCV 90  --  89  --  88 89   MCH 28.3  --  27.8  --  28.3 28.3   MCHC 31.5  --  31.2*  --  32.0 31.7   RDW 17.9*  --  17.2*  --  16.6* 16.5*     --  248  --  228 238     INR  Recent Labs   Lab 06/22/21  1421 06/19/21  1205   INR 0.93 1.00     Cultures:  Recent Labs   Lab 06/22/21  0847 06/19/21  1214   CULT Culture negative monitoring continues  Culture negative monitoring continues No growth     Assessment: Slow improvement of normalization of blood work with biliary drain. Unfortunately the biopsy has come back as adenocarcinoma but she may be a candidate for a whipple procedure.     Biliary drain in satisfactory position.     Plan: Discharge per hospitalist and Dr Grant.     Discharge instructions in AVS along with numbers to call.     Please send home with patient saline flushes as she will flush with 5 mL NS once a day, 4x4 gauze pads, alcohol pads, medipore tape and a measuring container.     30 minutes were spent with patient during today's visit with greater than 50% of the time spent face to face with the patient, in reviewing medical record and images and in counseling and coordinating patient's care.    Thanks [facility] Interventional Radiology CNP ([phone]) (phone [phone])

## 2021-06-25 ENCOUNTER — HOSPITAL ENCOUNTER (INPATIENT)
Facility: CLINIC | Age: 66
Setting detail: SURGERY ADMIT
End: 2021-06-25
Attending: SURGERY | Admitting: SURGERY
Payer: COMMERCIAL

## 2021-06-25 ENCOUNTER — TELEPHONE (OUTPATIENT)
Dept: SURGERY | Facility: CLINIC | Age: 66
End: 2021-06-25

## 2021-06-25 ENCOUNTER — VIRTUAL VISIT (OUTPATIENT)
Dept: SURGERY | Facility: CLINIC | Age: 66
End: 2021-06-25
Attending: SURGERY
Payer: COMMERCIAL

## 2021-06-25 ENCOUNTER — TELEPHONE (OUTPATIENT)
Dept: ONCOLOGY | Facility: CLINIC | Age: 66
End: 2021-06-25

## 2021-06-25 ENCOUNTER — PREP FOR PROCEDURE (OUTPATIENT)
Dept: SURGERY | Facility: CLINIC | Age: 66
End: 2021-06-25

## 2021-06-25 DIAGNOSIS — K83.1 BILIARY OBSTRUCTION (H): Primary | ICD-10-CM

## 2021-06-25 DIAGNOSIS — C17.0 DUODENAL CANCER (H): Primary | ICD-10-CM

## 2021-06-25 DIAGNOSIS — Z11.59 ENCOUNTER FOR SCREENING FOR OTHER VIRAL DISEASES: ICD-10-CM

## 2021-06-25 DIAGNOSIS — K83.1 BILIARY OBSTRUCTION (H): ICD-10-CM

## 2021-06-25 LAB — CANCER AG19-9 SERPL-ACNC: 10 U/ML (ref 0–37)

## 2021-06-25 PROCEDURE — 999N001193 HC VIDEO/TELEPHONE VISIT; NO CHARGE

## 2021-06-25 PROCEDURE — 99215 OFFICE O/P EST HI 40 MIN: CPT | Mod: 24 | Performed by: SURGERY

## 2021-06-25 NOTE — TELEPHONE ENCOUNTER
RN CARE COORDINATION    Outgoing Call:   Left message for Ana with plan for lab work the morning of 6/28 per Dr. Simmons's request. Contact number provided to call with any additional questions or concerns.                   YAS Hobson, RN  RN Care Coordinator  [facility]

## 2021-06-25 NOTE — TELEPHONE ENCOUNTER
LVM #2 to confirm surgery and appointments scheduled.    Sent [employer] message with surgery packet.     Provided direct number, or noted that she can reply via [employer].

## 2021-06-25 NOTE — TELEPHONE ENCOUNTER
RNCC: Hazel Cabrera    Surgery is scheduled with Dr. Simmons on 6/29 at [place].  Scheduled per request of Dr. Simmons.    H&P: completed by inpatient team when patient was admitted, per Dr. Simmons. Will update if needed.    COVID-19 test: 6/27 at [facility]    Post-op: 7/19 as a in person visit.    Additional appointments (pre-op/post-op):   NO ADDITIONAL APPOINTMENTS NEEDED AT THIS TIME    Patient will receive a phone call from pre-admission nurses 1-2 days prior to surgery with arrival and start time.    Patient will complete COVID-19 test that was scheduled by surgical coordinator 2-4 days prior to surgery.     I left a detailed voicemail regarding the above information and asked for a call back.

## 2021-06-25 NOTE — PROGRESS NOTES
Ana is a 65 year old who is being evaluated via a billable video visit.      How would you like to obtain your AVS? MyChart     If the video visit is dropped, the invitation should be resent by: Text to cell phone: [phone]     Will anyone else be joining your video visit? No          Vitals - Patient Reported  Weight (Patient Reported): 79.4 kg (175 lb)  Height (Patient Reported): 152.4 cm (5')  BMI (Based on Pt Reported Ht/Wt): 34.18  Pain Score: Mild Pain (2)  Pain Loc: Abdomen    Bret Romero LPN    Video Start Time: 9:11 AM  Video-Visit Details    Type of service:  Video Visit    Video End Time:9:49 AM    Originating Location (pt. Location): Home    Distant Location (provider location):  [facility]     Platform used for Video Visit: [employer]     REASON FOR EVALUATION:  Newly diagnosed duodenal adenocarcinoma.    HISTORY OF PRESENT ILLNESS:  Ms. Dupree is a 65-year-old female who was having upper abdominal pain and actually underwent a laparoscopic cholecystectomy last month.  She did not have resolution of her symptoms and ultimately was reevaluated and found to have an obstruction of her biliary tree.  This was further evaluated with endoscopy, which was done by Dr. Grant.  The endoscopy revealed an apple core lesion in the duodenum, which was also clearly obstructing her biliary tree.  An effort at ERCP was made, however, they were unable to cannulate the biliary tree and therefore, the patient underwent a PCC placement by Interventional Radiology.      A repeat attempt was made to internalize the stent; however, this also failed.  The patient did have adequate biliary decompression from the PTC and therefore was discharged home in stable condition yesterday.  I was asked to see Ms. Dupree for consideration of surgical therapy.      A CT chest, abdomen and pelvis has been performed, which did not reveal any evidence of metastatic disease.      I met with her and  her daughter by virtual video visit and another daughter and soon to be son-in-law also joined by telephone.       I had a long discussion Leia and her daughters regarding my recommendation that she undergo a planned open Whipple procedure.  I discussed the details of that surgery.  I also discussed risks, which include, but are not limited to bleeding, infection, anastomotic leak, delayed gastric emptying, diabetes, pancreatic insufficiency, venous thromboembolism and a risk of mortality that I quoted at less than 2%.  I focused quite a bit on her weight and elevated risk of venous thromboembolism, wound healing complications as well as pancreatic duct leak and the need for external drainage.  I also discussed some of the long-term complications at length including changes to her digestive habits as well as diabetes.    Ms. Dupree is recovering from her acute biliary obstruction, but does have an albumin of 2.4 and a bilirubin of 10.2 when last checked, which was yesterday.  Her hemoglobin is also 8.8.      With those things in mind, she overall looks quite well and states that she is feeling quite well.  She is overweight, but an active person and exercises regularly including power walking, stair climbing, etc.  I believe that her liver dysfunction is related to the acute obstruction and should resolve fairly quickly.  However, we will plan to recheck her lab values next Monday to be certain that they are rebounding appropriately.  If not, we may have to delay her surgery  somewhat to allow her to recondition prior to moving forward with surgery.    Forty-five minutes was spent on virtual face-to-face time with Ms. Dupree and her family today.  An additional 15 minutes was spent in discussion with Dr. Grant in coordination of her care from [facility] yesterday.  An additional 15 minutes was spent in coordination of her care here at the [place] and preop organization.      I will look  forward to seeing Ms. Dupree in the near future for a planned open Whipple procedure.

## 2021-06-25 NOTE — LETTER
6/25/2021         RE: Ana Dupree  [address]  [place] [address]        Dear Colleague,    Thank you for referring your patient, Ana Dupree, to the [facility]. Please see a copy of my visit note below.    Ana is a 65 year old who is being evaluated via a billable video visit.      How would you like to obtain your AVS? MyChart     If the video visit is dropped, the invitation should be resent by: Text to cell phone: [phone]     Will anyone else be joining your video visit? No          Vitals - Patient Reported  Weight (Patient Reported): 79.4 kg (175 lb)  Height (Patient Reported): 152.4 cm (5')  BMI (Based on Pt Reported Ht/Wt): 34.18  Pain Score: Mild Pain (2)  Pain Loc: Abdomen    Bret Romero LPN    Video Start Time: 9:11 AM  Video-Visit Details    Type of service:  Video Visit    Video End Time:9:49 AM    Originating Location (pt. Location): Home    Distant Location (provider location):  [facility]     Platform used for Video Visit: Ghost     REASON FOR EVALUATION:  Newly diagnosed duodenal adenocarcinoma.    HISTORY OF PRESENT ILLNESS:  Ms. Dupree is a 65-year-old female who was having upper abdominal pain and actually underwent a laparoscopic cholecystectomy last month.  She did not have resolution of her symptoms and ultimately was reevaluated and found to have an obstruction of her biliary tree.  This was further evaluated with endoscopy, which was done by Dr. Grant.  The endoscopy revealed an apple core lesion in the duodenum, which was also clearly obstructing her biliary tree.  An effort at ERCP was made, however, they were unable to cannulate the biliary tree and therefore, the patient underwent a PCC placement by Interventional Radiology.      A repeat attempt was made to internalize the stent; however, this also failed.  The patient did have adequate biliary decompression from  the PTC and therefore was discharged home in stable condition yesterday.  I was asked to see Ms. Dupree for consideration of surgical therapy.      A CT chest, abdomen and pelvis has been performed, which did not reveal any evidence of metastatic disease.      I met with her and her daughter by virtual video visit and another daughter and soon to be son-in-law also joined by telephone.       I had a long discussion Leia and her daughters regarding my recommendation that she undergo a planned open Whipple procedure.  I discussed the details of that surgery.  I also discussed risks, which include, but are not limited to bleeding, infection, anastomotic leak, delayed gastric emptying, diabetes, pancreatic insufficiency, venous thromboembolism and a risk of mortality that I quoted at less than 2%.  I focused quite a bit on her weight and elevated risk of venous thromboembolism, wound healing complications as well as pancreatic duct leak and the need for external drainage.  I also discussed some of the long-term complications at length including changes to her digestive habits as well as diabetes.    Ms. Dupree is recovering from her acute biliary obstruction, but does have an albumin of 2.4 and a bilirubin of 10.2 when last checked, which was yesterday.  Her hemoglobin is also 8.8.      With those things in mind, she overall looks quite well and states that she is feeling quite well.  She is overweight, but an active person and exercises regularly including power walking, stair climbing, etc.  I believe that her liver dysfunction is related to the acute obstruction and should resolve fairly quickly.  However, we will plan to recheck her lab values next Monday to be certain that they are rebounding appropriately.  If not, we may have to delay her surgery  somewhat to allow her to recondition prior to moving forward with surgery.    Forty-five minutes was spent on virtual face-to-face time with Ms.  Leia and her family today.  An additional 15 minutes was spent in discussion with Dr. Grant in coordination of her care from [facility] yesterday.  An additional 15 minutes was spent in coordination of her care here at the [place] and pre organization.      I will look forward to seeing Ms. Dupree in the near future for a planned open Whipple procedure.          Again, thank you for allowing me to participate in the care of your patient.        Sincerely,        Saman Simmons MD

## 2021-06-27 DIAGNOSIS — K83.1 BILIARY OBSTRUCTION (H): ICD-10-CM

## 2021-06-27 LAB
BACTERIA SPEC CULT: NO GROWTH
LABORATORY COMMENT REPORT: NORMAL
SARS-COV-2 RNA RESP QL NAA+PROBE: NEGATIVE
SARS-COV-2 RNA RESP QL NAA+PROBE: NORMAL
SPECIMEN SOURCE: NORMAL

## 2021-06-27 PROCEDURE — U0003 INFECTIOUS AGENT DETECTION BY NUCLEIC ACID (DNA OR RNA); SEVERE ACUTE RESPIRATORY SYNDROME CORONAVIRUS 2 (SARS-COV-2) (CORONAVIRUS DISEASE [COVID-19]), AMPLIFIED PROBE TECHNIQUE, MAKING USE OF HIGH THROUGHPUT TECHNOLOGIES AS DESCRIBED BY CMS-2020-01-R: HCPCS | Performed by: SURGERY

## 2021-06-27 PROCEDURE — U0005 INFEC AGEN DETEC AMPLI PROBE: HCPCS | Performed by: SURGERY

## 2021-06-28 ENCOUNTER — TELEPHONE (OUTPATIENT)
Dept: ONCOLOGY | Facility: CLINIC | Age: 66
End: 2021-06-28

## 2021-06-28 ENCOUNTER — TELEPHONE (OUTPATIENT)
Dept: SURGERY | Facility: CLINIC | Age: 66
End: 2021-06-28

## 2021-06-28 ENCOUNTER — DOCUMENTATION ONLY (OUTPATIENT)
Dept: SURGERY | Facility: CLINIC | Age: 66
End: 2021-06-28

## 2021-06-28 ENCOUNTER — HOSPITAL ENCOUNTER (OUTPATIENT)
Facility: CLINIC | Age: 66
Discharge: HOME | End: 2021-06-28
Attending: RADIOLOGY | Admitting: RADIOLOGY
Payer: COMMERCIAL

## 2021-06-28 ENCOUNTER — TELEPHONE (OUTPATIENT)
Dept: SURGERY | Facility: CLINIC | Age: 66
End: 2021-06-28
Payer: COMMERCIAL

## 2021-06-28 ENCOUNTER — ANESTHESIA EVENT (OUTPATIENT)
Dept: SURGERY | Facility: CLINIC | Age: 66
End: 2021-06-28

## 2021-06-28 ENCOUNTER — HOSPITAL ENCOUNTER (OUTPATIENT)
Dept: LAB | Facility: CLINIC | Age: 66
End: 2021-06-28
Attending: SURGERY
Payer: COMMERCIAL

## 2021-06-28 ENCOUNTER — PATIENT OUTREACH (OUTPATIENT)
Dept: SURGERY | Facility: CLINIC | Age: 66
End: 2021-06-28

## 2021-06-28 ENCOUNTER — TELEPHONE (OUTPATIENT)
Dept: INTERVENTIONAL RADIOLOGY/VASCULAR | Facility: CLINIC | Age: 66
End: 2021-06-28

## 2021-06-28 ENCOUNTER — APPOINTMENT (OUTPATIENT)
Dept: INTERVENTIONAL RADIOLOGY/VASCULAR | Facility: CLINIC | Age: 66
End: 2021-06-28
Attending: RADIOLOGY
Payer: COMMERCIAL

## 2021-06-28 VITALS
BODY MASS INDEX: 31.89 KG/M2 | SYSTOLIC BLOOD PRESSURE: 130 MMHG | WEIGHT: 180 LBS | OXYGEN SATURATION: 97 % | TEMPERATURE: 97.9 F | RESPIRATION RATE: 16 BRPM | HEART RATE: 61 BPM | HEIGHT: 63 IN | DIASTOLIC BLOOD PRESSURE: 64 MMHG

## 2021-06-28 DIAGNOSIS — K83.1 BILIARY OBSTRUCTION (H): ICD-10-CM

## 2021-06-28 DIAGNOSIS — K83.1 BILIARY OBSTRUCTION (H): Primary | ICD-10-CM

## 2021-06-28 DIAGNOSIS — C17.0 DUODENAL CANCER (H): ICD-10-CM

## 2021-06-28 LAB
ALBUMIN SERPL-MCNC: 2.4 G/DL (ref 3.4–5)
ALP SERPL-CCNC: 181 U/L (ref 40–150)
ALT SERPL W P-5'-P-CCNC: 64 U/L (ref 0–50)
ANION GAP SERPL CALCULATED.3IONS-SCNC: 3 MMOL/L (ref 3–14)
AST SERPL W P-5'-P-CCNC: 34 U/L (ref 0–45)
BASOPHILS # BLD AUTO: 0 10E9/L (ref 0–0.2)
BASOPHILS NFR BLD AUTO: 0.3 %
BILIRUB SERPL-MCNC: 17.5 MG/DL (ref 0.2–1.3)
BUN SERPL-MCNC: 10 MG/DL (ref 7–30)
CALCIUM SERPL-MCNC: 9 MG/DL (ref 8.5–10.1)
CHLORIDE SERPL-SCNC: 104 MMOL/L (ref 94–109)
CO2 SERPL-SCNC: 28 MMOL/L (ref 20–32)
CREAT SERPL-MCNC: 0.64 MG/DL (ref 0.52–1.04)
DIFFERENTIAL METHOD BLD: ABNORMAL
EOSINOPHIL # BLD AUTO: 0.1 10E9/L (ref 0–0.7)
EOSINOPHIL NFR BLD AUTO: 0.9 %
ERYTHROCYTE [DISTWIDTH] IN BLOOD BY AUTOMATED COUNT: 19.2 % (ref 10–15)
GFR SERPL CREATININE-BSD FRML MDRD: >90 ML/MIN/{1.73_M2}
GLUCOSE SERPL-MCNC: 107 MG/DL (ref 70–99)
HCT VFR BLD AUTO: 28.2 % (ref 35–47)
HGB BLD-MCNC: 8.9 G/DL (ref 11.7–15.7)
IMM GRANULOCYTES # BLD: 0.1 10E9/L (ref 0–0.4)
IMM GRANULOCYTES NFR BLD: 0.7 %
INR PPP: 0.95 (ref 0.86–1.14)
LYMPHOCYTES # BLD AUTO: 1.3 10E9/L (ref 0.8–5.3)
LYMPHOCYTES NFR BLD AUTO: 13.2 %
MCH RBC QN AUTO: 27.6 PG (ref 26.5–33)
MCHC RBC AUTO-ENTMCNC: 31.6 G/DL (ref 31.5–36.5)
MCV RBC AUTO: 87 FL (ref 78–100)
MONOCYTES # BLD AUTO: 0.8 10E9/L (ref 0–1.3)
MONOCYTES NFR BLD AUTO: 8.7 %
NEUTROPHILS # BLD AUTO: 7.3 10E9/L (ref 1.6–8.3)
NEUTROPHILS NFR BLD AUTO: 76.2 %
NRBC # BLD AUTO: 0 10*3/UL
NRBC BLD AUTO-RTO: 0 /100
PLATELET # BLD AUTO: 336 10E9/L (ref 150–450)
POTASSIUM SERPL-SCNC: 4.1 MMOL/L (ref 3.4–5.3)
PROT SERPL-MCNC: 7.2 G/DL (ref 6.8–8.8)
RBC # BLD AUTO: 3.23 10E12/L (ref 3.8–5.2)
SODIUM SERPL-SCNC: 135 MMOL/L (ref 133–144)
WBC # BLD AUTO: 9.6 10E9/L (ref 4–11)

## 2021-06-28 PROCEDURE — 36415 COLL VENOUS BLD VENIPUNCTURE: CPT | Performed by: SURGERY

## 2021-06-28 PROCEDURE — 99152 MOD SED SAME PHYS/QHP 5/>YRS: CPT

## 2021-06-28 PROCEDURE — C1769 GUIDE WIRE: HCPCS

## 2021-06-28 PROCEDURE — 85610 PROTHROMBIN TIME: CPT | Performed by: SURGERY

## 2021-06-28 PROCEDURE — 999N000163 HC STATISTIC SIMPLE TUBE INSERTION/CHARGE, PORT, CATH, FISTULOGRAM

## 2021-06-28 PROCEDURE — 85025 COMPLETE CBC W/AUTO DIFF WBC: CPT | Performed by: SURGERY

## 2021-06-28 PROCEDURE — 250N000009 HC RX 250: Performed by: NURSE PRACTITIONER

## 2021-06-28 PROCEDURE — C1729 CATH, DRAINAGE: HCPCS

## 2021-06-28 PROCEDURE — 250N000011 HC RX IP 250 OP 636: Performed by: NURSE PRACTITIONER

## 2021-06-28 PROCEDURE — 96365 THER/PROPH/DIAG IV INF INIT: CPT

## 2021-06-28 PROCEDURE — 80053 COMPREHEN METABOLIC PANEL: CPT | Performed by: SURGERY

## 2021-06-28 RX ORDER — NALOXONE HYDROCHLORIDE 0.4 MG/ML
0.4 INJECTION, SOLUTION INTRAMUSCULAR; INTRAVENOUS; SUBCUTANEOUS
Status: DISCONTINUED | OUTPATIENT
Start: 2021-06-28 | End: 2021-06-28 | Stop reason: HOSPADM

## 2021-06-28 RX ORDER — FLUMAZENIL 0.1 MG/ML
0.2 INJECTION, SOLUTION INTRAVENOUS
Status: DISCONTINUED | OUTPATIENT
Start: 2021-06-28 | End: 2021-06-28 | Stop reason: HOSPADM

## 2021-06-28 RX ORDER — NALOXONE HYDROCHLORIDE 0.4 MG/ML
0.4 INJECTION, SOLUTION INTRAMUSCULAR; INTRAVENOUS; SUBCUTANEOUS
Status: CANCELLED | OUTPATIENT
Start: 2021-06-28

## 2021-06-28 RX ORDER — AMPICILLIN AND SULBACTAM 2; 1 G/1; G/1
3 INJECTION, POWDER, FOR SOLUTION INTRAMUSCULAR; INTRAVENOUS
Status: COMPLETED | OUTPATIENT
Start: 2021-06-28 | End: 2021-06-28

## 2021-06-28 RX ORDER — FENTANYL CITRATE 50 UG/ML
25-50 INJECTION, SOLUTION INTRAMUSCULAR; INTRAVENOUS EVERY 5 MIN PRN
Status: DISCONTINUED | OUTPATIENT
Start: 2021-06-28 | End: 2021-06-28 | Stop reason: HOSPADM

## 2021-06-28 RX ORDER — LEVOFLOXACIN 5 MG/ML
500 INJECTION, SOLUTION INTRAVENOUS ONCE
Status: CANCELLED | OUTPATIENT
Start: 2021-06-28

## 2021-06-28 RX ORDER — NALOXONE HYDROCHLORIDE 0.4 MG/ML
0.2 INJECTION, SOLUTION INTRAMUSCULAR; INTRAVENOUS; SUBCUTANEOUS
Status: CANCELLED | OUTPATIENT
Start: 2021-06-28

## 2021-06-28 RX ORDER — NALOXONE HYDROCHLORIDE 0.4 MG/ML
0.2 INJECTION, SOLUTION INTRAMUSCULAR; INTRAVENOUS; SUBCUTANEOUS
Status: DISCONTINUED | OUTPATIENT
Start: 2021-06-28 | End: 2021-06-28 | Stop reason: HOSPADM

## 2021-06-28 RX ORDER — FLUMAZENIL 0.1 MG/ML
0.2 INJECTION, SOLUTION INTRAVENOUS
Status: CANCELLED | OUTPATIENT
Start: 2021-06-28

## 2021-06-28 RX ORDER — FENTANYL CITRATE 50 UG/ML
25-50 INJECTION, SOLUTION INTRAMUSCULAR; INTRAVENOUS
Status: CANCELLED | OUTPATIENT
Start: 2021-06-28

## 2021-06-28 RX ADMIN — LIDOCAINE HYDROCHLORIDE 18 ML: 10 INJECTION, SOLUTION INFILTRATION; PERINEURAL at 14:08

## 2021-06-28 RX ADMIN — AMPICILLIN SODIUM AND SULBACTAM SODIUM 3 G: 2; 1 INJECTION, POWDER, FOR SOLUTION INTRAMUSCULAR; INTRAVENOUS at 13:27

## 2021-06-28 RX ADMIN — MIDAZOLAM HYDROCHLORIDE 1 MG: 1 INJECTION, SOLUTION INTRAMUSCULAR; INTRAVENOUS at 13:57

## 2021-06-28 RX ADMIN — FENTANYL CITRATE 50 MCG: 50 INJECTION, SOLUTION INTRAMUSCULAR; INTRAVENOUS at 14:02

## 2021-06-28 RX ADMIN — MIDAZOLAM HYDROCHLORIDE 1 MG: 1 INJECTION, SOLUTION INTRAMUSCULAR; INTRAVENOUS at 14:02

## 2021-06-28 RX ADMIN — FENTANYL CITRATE 50 MCG: 50 INJECTION, SOLUTION INTRAMUSCULAR; INTRAVENOUS at 13:57

## 2021-06-28 ASSESSMENT — MIFFLIN-ST. JEOR: SCORE: 1335.6

## 2021-06-28 NOTE — TELEPHONE ENCOUNTER
Patient left vm on ENT surgery line requesting a call back from [facility] for scheduling appts.   Routed to [facility] for follow up as patient is scheduled with Dr. Simmons.

## 2021-06-28 NOTE — IR NOTE
Patient Name: Ana Dupree  Medical Record Number: [id number]  Today's Date: June 28, 2021    Start Time: 1357  End of procedure time: 1409  Procedure: biliary drain exchange with intravenous moderate sedation  Report given to: CS RN  Time pt departs:  1415     Other Notes: Pt into IR suite 1 via cart IVF infusing. Pt awake and alert. To table in supine position prepped and draped with 2%. VSS. Tele SB. Dr. Damon in room. Time out and procedure started. Pt tolerated procedure well. Debrief with Dr. Damon. Pressure held until hemostasis achieved. Dressing CDI. No complications. Pt transferred back to CS. Report given to CS RN    Medications: Last sedation given at 1402    Versed 2mg  Fentanyl 100mcg  Lidocaine 1% 18ml    Lesley Haynes RN

## 2021-06-28 NOTE — TELEPHONE ENCOUNTER
Called to discuss surgical plan.  Will have to cancel surgery until PTC functioning.   Bili 17 today with albumin of 2.4    Seeing IR today to assess PTC.  Will plan labs on Wednesday and Friday to assess for improvement. If unable to correct, will have seen by IR at the .    Encouraged PO intake and supplement shakes.    Likely will have to delay surgery for a months or so depending on how quickly LFTs and albumin improve.    Patient discouraged by drain issues, but understands situation.

## 2021-06-28 NOTE — PRE-PROCEDURE
GENERAL PRE-PROCEDURE:   Procedure:  Biliary drain exchange with intravenous moderate sedation   Date/Time:  6/28/2021 12:30 PM    Written consent obtained?: Yes    Risks and benefits: Risks, benefits and alternatives were discussed    Consent given by:  Patient  Patient states understanding of procedure being performed: Yes    Patient's understanding of procedure matches consent: Yes    Procedure consent matches procedure scheduled: Yes    Expected level of sedation:  Moderate  Appropriately NPO:  Yes  ASA Class:  Class 3- Severe systemic disease, definite functional limitations  Mallampati  :  Grade 3- soft palate visible, posterior pharyngeal wall not visible  Lungs:  Lungs clear with good breath sounds bilaterally  Heart:  Normal heart sounds and rate  History & Physical reviewed:  History and physical reviewed and no updates needed  Statement of review:  I have reviewed the lab findings, diagnostic data, medications, and the plan for sedation

## 2021-06-28 NOTE — PROGRESS NOTES
Care Suites Admission Nursing Note    Patient Information  Name: Ana Dupree  Age: 65 year old  Reason for admission: bili tube change  Care Suites arrival time: 1300    Visitor Information  Name: Bar  Informed of visitor restrictions: Yes  1 visitor allowed per patient   Visitor must screen negative for COVID symptoms   Visitor must wear a mask  Waiting rooms closed to visitors    Patient Admission/Assessment   Pre-procedure assessment complete: Yes  If abnormal assessment/labs, provider notified: Yes  NPO: Yes  Medications held per instructions/orders: Yes  Consent: obtained  If applicable, pregnancy test status: declined  Patient oriented to room: Yes  Education/questions answered: Yes  Plan/other: scheduled for 1530    Discharge Planning  Discharge name/phone number: Bar [phone]  Overnight post sedation caregiver: KENJI  Discharge location: home    Myrna Kaplan RN

## 2021-06-28 NOTE — PROGRESS NOTES
Care Suites Post Procedure Note    Patient Information  Name: Ana Dupree  Age: 65 year old    Post Procedure  Time patient returned to Care Suites: 1415   Concerns/abnormal assessment: pt c/o bloody urine prior to going to IR  If abnormal assessment, provider notified: Yes  Plan/Other: discharge at 1500    Myrna Kaplan RN

## 2021-06-28 NOTE — PROGRESS NOTES
Care Suites Discharge Nursing Note    Patient Information  Name: Ana Dupree  Age: 65 year old    Discharge Education:  Discharge instructions reviewed: Yes  Additional education/resources provided: yes  Patient/patient representative verbalizes understanding: Yes  Patient discharging on new medications: No  Medication education completed: Yes    Discharge Plans:   Discharge location: home  Discharge ride contacted: Yes  Approximate discharge time: 1528    Discharge Criteria:  Discharge criteria met and vital signs stable: Yes    Patient Belongs:  Patient belongings returned to patient: Yes    Myrna Kaplan RN

## 2021-06-28 NOTE — TELEPHONE ENCOUNTER
[facility].    I called her and she was asking questions about her surgery tomorrow because she is feeling jaundice again and surgery was postponed last time she felt like this. She said that they had to get certain levels down last time. She also stated that her daughters have been calling in to help get answers to questions about her drain. Her daughters spoke with someone about the drain and that person told her to turn the drain up to 10.    I explained that I am not a nurse and would pass this along to Hazel.     Surgery is tomorrow, sent as high priority.

## 2021-06-28 NOTE — DISCHARGE INSTRUCTIONS
Biliary Tube Placement/Exchange Discharge Instructions     After you go home:      You may resume your normal diet    Drink plenty of fluids, especially water    Have an adult stay with you for 6 hours if you received sedation       For 24 hours - due to the sedation you received:    Relax and take it easy    Do NOT make any important or legal decisions    Do NOT drive or operate machines at home or at work    Do NOT drink alcohol    Care of Tube Site:      For the first 48 hrs, check your puncture site every couple hours while you are awake     Check the tube site twice a day for signs of infection    Keep the dressing around the tube dry    Change the dressing every 2-3 days if it is gauze/tape. If there is a Stay Fix dressing intact - this should be changed weekly.    Change the dressing if it becomes wet or dirty    You may shower but do not get the dressing wet. Place a waterproof cover over the dressing (such as plastic wrap).     No tub baths, whirlpools or swimming until the tube is removed     Activity:      You may go back to normal activity in 24 hours    Wait 48 hours before lifting, straining, exercise or other strenuous activity    Medicines:      You may resume all medications    Resume your Warfarin/Coumadin at your regular dose today. Follow up with your provider to have your INR rechecked    Resume your Platelet Inhibitors and Aspirin tomorrow at your regular dose    For minor pain, you may take Acetaminophen (Tylenol) or Ibuprofen (Advil)               Call the provider who ordered this procedure if:      The site is red, swollen, hot or tender    There is foul-smelling drainage from the tube site    Pain that is getting worse or that does not improve with pain medication    Chills or a fever greater than 101 F (38 C)    Fluid stops draining or is leaking around the tube onto your skin    The tube falls out    Any questions or concerns    Call  911 or go to the Emergency Room if:      Severe pain  or trouble breathing    Bleeding that you cannot control    Other Instructions:      If the tube falls out - cover the opening with gauze & tape    Record drainage output amounts & bring sheet to return appointments    Take your temperature daily      If you have questions call:          [facility] @ [phone]      The provider who performed your procedure was Dr. Damon

## 2021-06-28 NOTE — PROGRESS NOTES
Surgical Oncology RN Care Coordination Note:     Called and spoke with patient regarding drain concerns and possible need to delay the procedure. Per patient her drainage has decreased since discharge and she feels she is more itchy and feels she is more yellow than at the time of her discharge. Informed her that we are still awaiting results of the CMP but that pending results we may need to delay surgery if her LFTs have not decreased and d/t her albumin level we may also have to delay as the typically value Dr. Simmons recommends prior to whipple is at least 3. Informed her that I have a call out to the IR team to assist with getting the drain checked ASAP to ensure its working properly and that I asked that they contact her directly. Informed her she may get a call from them or myself regarding follow up pending discussion and lab values. She verbalized understanding and will await call.     Called and left detailed message with Lilian, NP in IR regarding patients drain concerns and symptoms. Informed her that patient is aware we were reaching out and that they can contact her directly.     Hazel Cabrera, RN, BSN  Care Coordinator

## 2021-06-28 NOTE — PROGRESS NOTES
Biliary Drain Care at Home     1) Change the gauze and tape dressing every 2-3 days. Wash the skin with soap and water and allow to air dry before re dressing  -Please cover with plastic (saran wrap) prior to showering and change it the dressing gets wet.     2) Flush the drain with 10 mL Normal Saline (NS) twice daily, morning and evening. (See instructions below)   -Empty, measure and record the outputs daily. Subtract the NS flush amount from the total    Flushing your tube with Saline  1) Clean your work area and wash your hands with soap and water or foam them with antibacterial .   Gather a NS syringe and alcohol pad    2) Take the clear package off the syringe. Remove the cap from the syringe and hold the syringe so it points upwards. Gently push the plunger until the air comes out and you have the right amount of saline.   Replace the syringe cap.     3) Flush the drain  A. Make sure the off arm of the stopcock if pointing to the flush port as shown below.   B. Clean the flush port with an alcohol wipe  C. Screw the tip of the syringe into the flush port        D. Turn the off arm of the stopcock toward the drainage bag (see below)   E. Slowly push int eh plunger to inject the saline. If you feel pain or resistance (if it is hard to inject the saline) stop.     F. Turn the off arm of the stopcock back toward the flush port (see below)   G. Unscrew and remove the syringe. If you have a cap put it back on the flush port.       Please call Mary DYSON RN clinician at  or Lilian DYSON NP at  for questions or concerns about the tube. You can leave a voicemail. We work Monday through Friday [phone] or [phone].     An alternative number to call for questions is Interventional Radiology at

## 2021-06-28 NOTE — PROGRESS NOTES
PATIENT/VISITOR WELLNESS SCREENING    Step 1 Patient Screening    1. In the last month, have you been in contact with someone who was confirmed or suspected to have Coronavirus/COVID-19? No    2. Do you have the following symptoms?  Fever/Chills? No   Cough? No   Shortness of breath? No   New loss of taste or smell? No  Sore throat? No  Muscle or body aches? No  Headaches? No  Fatigue? No  Vomiting or diarrhea? No    Step 2 Visitor Screening    1. Name of Visitor (1 visitor per patient): Bar- daughter    2. In the last month, have you been in contact with someone who was confirmed or suspected to have Coronavirus/COVID-19? No    3. Do you have the following symptoms?  Fever/Chills? No   Cough? No   Shortness of breath? No   Skin rash? No   Loss of taste or smell? No  Sore throat? No  Runny or stuffy nose? No  Muscle or body aches? No  Headaches? No  Fatigue? No  Vomiting or diarrhea? No    If the visitor has positive symptoms, notify supervisor/manger  Per policy, the visitor will need to leave the facility     Step 3 Refer to logic grid below for actions    NO SYMPTOM(S)    ACTIONS:  1. Standard rooming process  2. Provider to assess per normal protocol  3. Implement precautions as needed and per guidelines     POSITIVE SYMPTOM(S)  If positive for ANY of the following symptoms: fever, cough, shortness of breath, rash    ACTION:  1. Continue to have the patient wear a mask   2. Room patient as soon as possible  3. Don appropriate PPE when entering room  4. Provider evaluation

## 2021-06-28 NOTE — TELEPHONE ENCOUNTER
Interventional Radiology     Received a call from Ana Hernandez's daughter this am, that the biliary drain was not draining well and that her mom was more jaundiced and itchy the past day.     Patient was coming in for lab work this morning so I told them let's wait to see what this looks like. Chart review shows patient is scheduled for a whipple with Dr Simmons tomorrow and the lab work was for pre surgical planning.     Total bili came back at 17.5. Discussed with Ivet Cabrera RN with Dr Simmons, surgeon who is doing the Whipple. She said the case was canceled for tomorrow because of the low albumin (at 2.4 want 3.0) and elevated T bilirubin.     Discussed with Ana. She said she noticed a kink in her tube and when she straightened it out it drained better. Either way she will need a drain exchange.     Ana is coming in today for a biliary tube exchange.     Discussed above with IR Dr Pedro Damon today.     Thanks [facility] Interventional Radiology CNP ([phone]) (phone [phone])       Hospital Outpatient Visit on 06/28/2021   Component Date Value Ref Range Status     INR 06/28/2021 0.95  0.86 - 1.14 Final     Sodium 06/28/2021 135  133 - 144 mmol/L Final     Potassium 06/28/2021 4.1  3.4 - 5.3 mmol/L Final     Chloride 06/28/2021 104  94 - 109 mmol/L Final     Carbon Dioxide 06/28/2021 28  20 - 32 mmol/L Final     Anion Gap 06/28/2021 3  3 - 14 mmol/L Final     Glucose 06/28/2021 107* 70 - 99 mg/dL Final     Urea Nitrogen 06/28/2021 10  7 - 30 mg/dL Final     Creatinine 06/28/2021 0.64  0.52 - 1.04 mg/dL Final     GFR Estimate 06/28/2021 >90  >60 mL/min/[1.73_m2] Corrected    Comment: Starting 12/18/2018, serum creatinine based estimated GFR (eGFR) will be   calculated using the Chronic Kidney Disease Epidemiology Collaboration   (CKD-EPI) equation.  CORRECTED ON 06/28 AT 1041: PREVIOUSLY REPORTED AS >90 Non    GFR Calc Starting 12/18/2018, serum creatinine based estimated GFR  (eGFR) will   be calculated using the Chronic Kidney Disease Epidemiology Collaboration   (CKD EPI) equation.       GFR Estimate If Black 06/28/2021 >90  >60 mL/min/[1.73_m2] Corrected    Comment: Starting 12/18/2018, serum creatinine based estimated GFR (eGFR) will be   calculated using the Chronic Kidney Disease Epidemiology Collaboration   (CKD-EPI) equation.  CORRECTED ON 06/28 AT 1041: PREVIOUSLY REPORTED AS >90  GFR   Calc Starting 12/18/2018, serum creatinine based estimated GFR (eGFR) will be   calculated using the Chronic Kidney Disease Epidemiology Collaboration (CKD   EPI) equation.       Calcium 06/28/2021 9.0  8.5 - 10.1 mg/dL Final     Bilirubin Total 06/28/2021 17.5* 0.2 - 1.3 mg/dL Final    Comment: Critical Value called to and read back by  ILENE ROSS NURSE AT THE [facility] AT 1014 LC       Albumin 06/28/2021 2.4* 3.4 - 5.0 g/dL Final     Protein Total 06/28/2021 7.2  6.8 - 8.8 g/dL Final     Alkaline Phosphatase 06/28/2021 181* 40 - 150 U/L Final     ALT 06/28/2021 64* 0 - 50 U/L Final     AST 06/28/2021 34  0 - 45 U/L Final     WBC 06/28/2021 9.6  4.0 - 11.0 10e9/L Final     RBC Count 06/28/2021 3.23* 3.8 - 5.2 10e12/L Final     Hemoglobin 06/28/2021 8.9* 11.7 - 15.7 g/dL Final     Hematocrit 06/28/2021 28.2* 35.0 - 47.0 % Final     MCV 06/28/2021 87  78 - 100 fl Final     MCH 06/28/2021 27.6  26.5 - 33.0 pg Final     MCHC 06/28/2021 31.6  31.5 - 36.5 g/dL Final     RDW 06/28/2021 19.2* 10.0 - 15.0 % Final     Platelet Count 06/28/2021 336  150 - 450 10e9/L Final     Diff Method 06/28/2021 Automated Method   Final     % Neutrophils 06/28/2021 76.2  % Final     % Lymphocytes 06/28/2021 13.2  % Final     % Monocytes 06/28/2021 8.7  % Final     % Eosinophils 06/28/2021 0.9  % Final     % Basophils 06/28/2021 0.3  % Final     % Immature Granulocytes 06/28/2021 0.7  % Final     Nucleated RBCs 06/28/2021 0  0 /100 Final     Absolute Neutrophil 06/28/2021 7.3  1.6 - 8.3  10e9/L Final     Absolute Lymphocytes 06/28/2021 1.3  0.8 - 5.3 10e9/L Final     Absolute Monocytes 06/28/2021 0.8  0.0 - 1.3 10e9/L Final     Absolute Eosinophils 06/28/2021 0.1  0.0 - 0.7 10e9/L Final     Absolute Basophils 06/28/2021 0.0  0.0 - 0.2 10e9/L Final     Abs Immature Granulocytes 06/28/2021 0.1  0 - 0.4 10e9/L Final     Absolute Nucleated RBC 06/28/2021 0.0   Final

## 2021-06-29 ENCOUNTER — ANESTHESIA (OUTPATIENT)
Dept: SURGERY | Facility: CLINIC | Age: 66
End: 2021-06-29

## 2021-06-29 LAB
BACTERIA SPEC CULT: NORMAL
Lab: NORMAL
SPECIMEN SOURCE: NORMAL

## 2021-06-30 ENCOUNTER — PATIENT OUTREACH (OUTPATIENT)
Dept: SURGERY | Facility: CLINIC | Age: 66
End: 2021-06-30

## 2021-06-30 DIAGNOSIS — C17.0 DUODENAL CANCER (H): ICD-10-CM

## 2021-06-30 DIAGNOSIS — K83.1 BILIARY OBSTRUCTION (H): Primary | ICD-10-CM

## 2021-06-30 NOTE — PROGRESS NOTES
Surgical Oncology RN Care Coordination Note:     Called and spoke with patient regarding follow up plans and that we will be proceeding with repeat labs tomorrow. Per patient drain is now functioning and has been switched to a drainage bag instead of a bulb. She is flushing the drain BID with 10 ml of saline as recommended. rx for flushes has been sent to the pharmacy for the patient to  when she comes for the repeat labs. Informed her that pending lab results, we will contact her to discuss timing and frequency of lab checks prior to tentative surgery. Informed her we will also start working on getting her rescheduled for surgery in the coming weeks, so she may hear from our surgery scheduler to confirm the new dates and time. She verbalized understanding and was appreciative of the call and follow up.     Hazel Cabrera, RN, BSN  Care Coordinator

## 2021-07-01 DIAGNOSIS — K83.1 BILIARY OBSTRUCTION (H): ICD-10-CM

## 2021-07-01 DIAGNOSIS — K83.1 BILIARY OBSTRUCTION (H): Primary | ICD-10-CM

## 2021-07-01 DIAGNOSIS — C17.0 DUODENAL CANCER (H): ICD-10-CM

## 2021-07-01 LAB
ALBUMIN SERPL-MCNC: 2.6 G/DL (ref 3.4–5)
ALP SERPL-CCNC: 181 U/L (ref 40–150)
ALT SERPL W P-5'-P-CCNC: 46 U/L (ref 0–50)
ANION GAP SERPL CALCULATED.3IONS-SCNC: 8 MMOL/L (ref 3–14)
AST SERPL W P-5'-P-CCNC: 35 U/L (ref 0–45)
BILIRUB SERPL-MCNC: 11.9 MG/DL (ref 0.2–1.3)
BUN SERPL-MCNC: 17 MG/DL (ref 7–30)
CALCIUM SERPL-MCNC: 9.1 MG/DL (ref 8.5–10.1)
CHLORIDE SERPL-SCNC: 100 MMOL/L (ref 94–109)
CO2 SERPL-SCNC: 27 MMOL/L (ref 20–32)
CREAT SERPL-MCNC: 0.62 MG/DL (ref 0.52–1.04)
GFR SERPL CREATININE-BSD FRML MDRD: >90 ML/MIN/{1.73_M2}
GLUCOSE SERPL-MCNC: 91 MG/DL (ref 70–99)
POTASSIUM SERPL-SCNC: 4.1 MMOL/L (ref 3.4–5.3)
PREALB SERPL IA-MCNC: 19 MG/DL (ref 15–45)
PROT SERPL-MCNC: 7.6 G/DL (ref 6.8–8.8)
SODIUM SERPL-SCNC: 136 MMOL/L (ref 133–144)

## 2021-07-01 PROCEDURE — 84134 ASSAY OF PREALBUMIN: CPT | Performed by: PATHOLOGY

## 2021-07-01 PROCEDURE — 80053 COMPREHEN METABOLIC PANEL: CPT | Performed by: PATHOLOGY

## 2021-07-01 PROCEDURE — 36415 COLL VENOUS BLD VENIPUNCTURE: CPT | Performed by: PATHOLOGY

## 2021-07-02 ENCOUNTER — TELEPHONE (OUTPATIENT)
Dept: SURGERY | Facility: CLINIC | Age: 66
End: 2021-07-02

## 2021-07-02 ENCOUNTER — PATIENT OUTREACH (OUTPATIENT)
Dept: SURGERY | Facility: CLINIC | Age: 66
End: 2021-07-02

## 2021-07-02 DIAGNOSIS — Z11.59 ENCOUNTER FOR SCREENING FOR OTHER VIRAL DISEASES: ICD-10-CM

## 2021-07-02 NOTE — PROGRESS NOTES
Surgical Oncology RN Care Coordination Note:     Called and spoke with patient at length regarding questions she left in voicemail and follow up plans. Discussed drain output and care. Per patient she has ongoing concerns the drain is not working or will fail again. Discussed with her that as long as the drain continues to flush,she has output and doesn't note that her itchiness or jaundice increase/worsenes then the drain is working. Informed her that also based on the improvement of the labs the drain is working. Informed her that we will continue with the plan to proceed with checking labs weekly and if there is evidence the drain is not working we will address them urgent to prevent any delays in surgery. Informed her that if the drain stops flushing or stop having drainage she needs to follow up with IR ASAP to have the drain checked. Patient reports she is concerns as she doesn't feel she is being told how to proceed and what she needs to do to proceed. Informed her that as we previously discuss she just needs to continue flushing the drain, monitoring output and contact us if any new concerns arise. Informed her if she feels her jaundice or itchiness is getting worse then she needs to contact IR or come the ER to have the drain assess ASAP to ensure the drain remains patent. Patient also reports she is concerned about having the drain in for 4 weeks and feels she is concerned this is to long and that she will have issues again prior to surgery, informed her that it is very common for these drains to be in for 4 weeks or longer and that as long as she continues to care for the drain as she was recommended she  should be ok. Informed her at any time she has concerns that she feels she needs to be evaluated for she should call IR or if its urgent go to the ER. She also questioned option for having the drain internalized by GI team at the [facility], informed her I spoke with Dr. Simmons regarding this and we  would refer to her the GI team to see if this was an option prior to the OR. Informed her we would reach out pending discussion. All questions answered the best of writers ability and patient verbalized understanding of plan at the end of 27 min phone call.       Hazel Cabrera RN, BSN  Care Coordinator

## 2021-07-08 ENCOUNTER — TELEPHONE (OUTPATIENT)
Dept: SURGERY | Facility: CLINIC | Age: 66
End: 2021-07-08

## 2021-07-08 DIAGNOSIS — K83.1 BILIARY OBSTRUCTION (H): ICD-10-CM

## 2021-07-08 DIAGNOSIS — C17.0 DUODENAL CANCER (H): Primary | ICD-10-CM

## 2021-07-08 DIAGNOSIS — C17.0 DUODENAL CANCER (H): ICD-10-CM

## 2021-07-08 LAB
ALBUMIN SERPL-MCNC: 2.7 G/DL (ref 3.4–5)
ALP SERPL-CCNC: 173 U/L (ref 40–150)
ALT SERPL W P-5'-P-CCNC: 52 U/L (ref 0–50)
ANION GAP SERPL CALCULATED.3IONS-SCNC: 9 MMOL/L (ref 3–14)
AST SERPL W P-5'-P-CCNC: 48 U/L (ref 0–45)
BILIRUB SERPL-MCNC: 9.9 MG/DL (ref 0.2–1.3)
BUN SERPL-MCNC: 13 MG/DL (ref 7–30)
CALCIUM SERPL-MCNC: 9.5 MG/DL (ref 8.5–10.1)
CHLORIDE SERPL-SCNC: 104 MMOL/L (ref 94–109)
CO2 SERPL-SCNC: 22 MMOL/L (ref 20–32)
CREAT SERPL-MCNC: 0.67 MG/DL (ref 0.52–1.04)
GFR SERPL CREATININE-BSD FRML MDRD: >90 ML/MIN/{1.73_M2}
GLUCOSE SERPL-MCNC: 91 MG/DL (ref 70–99)
POTASSIUM SERPL-SCNC: 4.2 MMOL/L (ref 3.4–5.3)
PROT SERPL-MCNC: 8 G/DL (ref 6.8–8.8)
SODIUM SERPL-SCNC: 136 MMOL/L (ref 133–144)

## 2021-07-08 PROCEDURE — 80053 COMPREHEN METABOLIC PANEL: CPT | Performed by: PATHOLOGY

## 2021-07-08 PROCEDURE — 36415 COLL VENOUS BLD VENIPUNCTURE: CPT | Performed by: PATHOLOGY

## 2021-07-08 NOTE — TELEPHONE ENCOUNTER
Had long discussion with Ana today. She is overall feeling better. Still has some itching but improving. Eating well. Activity as tolerated.     Re discussed plan to follow labs weekly until LFTS/albumin normalized.     She asked about internalziing her drain, which I recommended against, as it would be adding another procedure without benefit. We will plan to remove the PTC after surgery if no evidence of leaks or problems.  She voiced understanding of that.    She asked if it was OK to get screening mammogram. I said yes.    Surgery set for 7/28. Hopefully by then, Labs and conditioning will be normalized.

## 2021-07-12 ENCOUNTER — ANCILLARY PROCEDURE (OUTPATIENT)
Dept: MAMMOGRAPHY | Facility: CLINIC | Age: 66
End: 2021-07-12
Attending: OBSTETRICS & GYNECOLOGY
Payer: COMMERCIAL

## 2021-07-12 DIAGNOSIS — Z12.31 VISIT FOR SCREENING MAMMOGRAM: ICD-10-CM

## 2021-07-12 PROCEDURE — 77067 SCR MAMMO BI INCL CAD: CPT | Mod: TC | Performed by: RADIOLOGY

## 2021-07-15 ENCOUNTER — LAB (OUTPATIENT)
Dept: LAB | Facility: CLINIC | Age: 66
End: 2021-07-15
Attending: SURGERY
Payer: COMMERCIAL

## 2021-07-15 DIAGNOSIS — K83.1 BILIARY OBSTRUCTION (H): ICD-10-CM

## 2021-07-15 DIAGNOSIS — C17.0 DUODENAL CANCER (H): ICD-10-CM

## 2021-07-15 LAB
ALBUMIN SERPL-MCNC: 2.6 G/DL (ref 3.4–5)
ALP SERPL-CCNC: 156 U/L (ref 40–150)
ALT SERPL W P-5'-P-CCNC: 154 U/L (ref 0–50)
ANION GAP SERPL CALCULATED.3IONS-SCNC: 7 MMOL/L (ref 3–14)
AST SERPL W P-5'-P-CCNC: 117 U/L (ref 0–45)
BILIRUB SERPL-MCNC: 6.4 MG/DL (ref 0.2–1.3)
BUN SERPL-MCNC: 19 MG/DL (ref 7–30)
CALCIUM SERPL-MCNC: 8.9 MG/DL (ref 8.5–10.1)
CHLORIDE BLD-SCNC: 108 MMOL/L (ref 94–109)
CO2 SERPL-SCNC: 21 MMOL/L (ref 20–32)
CREAT SERPL-MCNC: 0.56 MG/DL (ref 0.52–1.04)
GFR SERPL CREATININE-BSD FRML MDRD: >90 ML/MIN/1.73M2
GLUCOSE BLD-MCNC: 117 MG/DL (ref 70–99)
POTASSIUM BLD-SCNC: 3.9 MMOL/L (ref 3.4–5.3)
PROT SERPL-MCNC: 7.3 G/DL (ref 6.8–8.8)
SODIUM SERPL-SCNC: 136 MMOL/L (ref 133–144)

## 2021-07-15 PROCEDURE — 36415 COLL VENOUS BLD VENIPUNCTURE: CPT | Performed by: PATHOLOGY

## 2021-07-15 PROCEDURE — 80053 COMPREHEN METABOLIC PANEL: CPT | Performed by: PATHOLOGY

## 2021-07-16 ENCOUNTER — TELEPHONE (OUTPATIENT)
Dept: SURGERY | Facility: CLINIC | Age: 66
End: 2021-07-16

## 2021-07-16 NOTE — PROGRESS NOTES
Received voicemail from patient stating that she has surgery on 7/28 with Dr. Simmons and is desperately trying to get an earlier date since daughter's wedding is on 8/28. Stated her bilirubin has been consistently going down and so she would like to move surgery to a sooner date.     Routed to [facility] for follow up.

## 2021-07-19 ENCOUNTER — VIRTUAL VISIT (OUTPATIENT)
Dept: SURGERY | Facility: CLINIC | Age: 66
End: 2021-07-19
Attending: SURGERY
Payer: COMMERCIAL

## 2021-07-19 ENCOUNTER — PATIENT OUTREACH (OUTPATIENT)
Dept: SURGERY | Facility: CLINIC | Age: 66
End: 2021-07-19

## 2021-07-19 DIAGNOSIS — C17.0 DUODENAL CANCER (H): Primary | ICD-10-CM

## 2021-07-19 DIAGNOSIS — K83.1 BILIARY OBSTRUCTION (H): Primary | ICD-10-CM

## 2021-07-19 DIAGNOSIS — E43 SEVERE MALNUTRITION (H): ICD-10-CM

## 2021-07-19 DIAGNOSIS — C17.0 DUODENAL CANCER (H): ICD-10-CM

## 2021-07-19 DIAGNOSIS — K83.1 BILIARY OBSTRUCTION (H): ICD-10-CM

## 2021-07-19 PROCEDURE — 99215 OFFICE O/P EST HI 40 MIN: CPT | Mod: 24 | Performed by: SURGERY

## 2021-07-19 PROCEDURE — 999N001193 HC VIDEO/TELEPHONE VISIT; NO CHARGE

## 2021-07-19 NOTE — PROGRESS NOTES
I reached out to Ana to talk about her labs from the end of last week.  Her bilirubin remains above 6 and her albumin remains at 2.6 with no signs of improvement.  She states that she is eating okay and actually taking 2 Ensure drinks per day and eating meats, etc., to maximize her protein, so overall, she feels like she is doing well from a diet standpoint; however, likely due to her biliary obstruction and perhaps some underlying liver disease, her albumin has really not improved and she remains severely hypoalbuminemic.    I discussed with Ana that surgical intervention really cannot be undertaken until her liver function improves as does her nutrition.  She indicated a lot of frustration with delays to getting her to surgery and I tried to explain her again, as I have multiple times previously, that severe hypoalbuminemia is a major risk factor for complications after surgery, such as a Whipple, and therefore it would be unsafe to proceed until she is in a little bit better nutritional state.      I discussed about the possibility of nutritional supplementation, either with feeding tube or TPN, but she really was fairly forceful about the fact that she is eating well and does not want to pursue those things.  She is angry that she does not believe that her drain is working properly, and therefore, I did discuss with her that we would get her drain checked as quickly as possible to be certain that it is draining properly.    I did try to explain to Ana that we do see patients like her, where their liver simply takes longer to recover than we would like, but there is no medication or surgery that can make that happen faster.  We will definitely get her drain checked as quickly as possible and I have already reached out to Interventional Radiology here at the [facility], per her request to get that set up.  In addition, we will connect her with Oncology.  She did meet an oncologist, Dr. Deng, at  [facility], but she asked to be seen here at the [facility] instead, therefore, we will get that set up for her as well.  I discussed with her that it could be that we have to start her on systemic chemotherapy while her liver improves, just so we do not have quite so long a period of her being without treatment.  We will see what her new oncologist feels about that.    I did try to explain to Ms. Dupree that we are doing everything we can to support her, but I understand her frustration because we are simply waiting for her liver and nutritional status to improve and that has been frustratingly slow.  I also did discuss that it is possible she has some underlying liver disease that we were not aware of and it may be more difficult or impossible to get her albumin up, in which case ultimately surgery may not be possible.  Having said that, I am very hopeful that things will continue to improve and that we will be able to get her nutritional status to the point where we can move forward with a Whipple procedure as we would like to, however, at this point, she is just not physiologically ready for that.      Finally, Ms. Dupree did inquire about a lot of discussions with other providers including the misunderstanding that we would operate on people with a bilirubin greater than 10, etc.  I tried to clarify as much as I could, but ultimately I tried to refocus on the current situation that her bilirubin is coming down, although slower than I would like, so we will get her drain checked to be sure it is functioning optimally.      Also, her nutritional status is not improving as I would have liked.  I discussed some options for invasive support, but she was a firm about the fact that she is eating well and supplementing her diet with Ensure and I think she is probably right about that.  We will just have to wait and how her liver recovers over the next few weeks.      For the time being, her surgery that was  tentatively scheduled for next week will be delayed until she improves her albumin ideally above 3.5 and we can get her bilirubin normalized.  I will plan to follow up with Ana after her next drain check.  In addition, she will followup labs later this week as well to track her progress    A total of 40 minutes was spent on the phone with Ms. Dupree, all of which was in consultation today.

## 2021-07-19 NOTE — LETTER
7/19/2021         RE: Ana Dupree  [address] [place] [id number]        Dear Colleague,    Thank you for referring your patient, Ana Dupree, to the [facility]. Please see a copy of my visit note below.    I reached out to Ana to talk about her labs from the end of last week.  Her bilirubin remains above 6 and her albumin remains at 2.6 with no signs of improvement.  She states that she is eating okay and actually taking 2 Ensure drinks per day and eating meats, etc., to maximize her protein, so overall, she feels like she is doing well from a diet standpoint; however, likely due to her biliary obstruction and perhaps some underlying liver disease, her albumin has really not improved and she remains severely hypoalbuminemic.    I discussed with Ana that surgical intervention really cannot be undertaken until her liver function improves as does her nutrition.  She indicated a lot of frustration with delays to getting her to surgery and I tried to explain her again, as I have multiple times previously, that severe hypoalbuminemia is a major risk factor for complications after surgery, such as a Whipple, and therefore it would be unsafe to proceed until she is in a little bit better nutritional state.      I discussed about the possibility of nutritional supplementation, either with feeding tube or TPN, but she really was fairly forceful about the fact that she is eating well and does not want to pursue those things.  She is angry that she does not believe that her drain is working properly, and therefore, I did discuss with her that we would get her drain checked as quickly as possible to be certain that it is draining properly.    I did try to explain to Ana that we do see patients like her, where their liver simply takes longer to recover than we would like, but there is no medication or surgery that can make that happen faster.  We  will definitely get her drain checked as quickly as possible and I have already reached out to Interventional Radiology here at the [facility], per her request to get that set up.  In addition, we will connect her with Oncology.  She did meet an oncologist, Dr. Deng, at [facility], but she asked to be seen here at the [facility] instead, therefore, we will get that set up for her as well.  I discussed with her that it could be that we have to start her on systemic chemotherapy while her liver improves, just so we do not have quite so long a period of her being without treatment.  We will see what her new oncologist feels about that.    I did try to explain to Ms. Dupree that we are doing everything we can to support her, but I understand her frustration because we are simply waiting for her liver and nutritional status to improve and that has been frustratingly slow.  I also did discuss that it is possible she has some underlying liver disease that we were not aware of and it may be more difficult or impossible to get her albumin up, in which case ultimately surgery may not be possible.  Having said that, I am very hopeful that things will continue to improve and that we will be able to get her nutritional status to the point where we can move forward with a Whipple procedure as we would like to, however, at this point, she is just not physiologically ready for that.      Finally, Ms. Dupree did inquire about a lot of discussions with other providers including the misunderstanding that we would operate on people with a bilirubin greater than 10, etc.  I tried to clarify as much as I could, but ultimately I tried to refocus on the current situation that her bilirubin is coming down, although slower than I would like, so we will get her drain checked to be sure it is functioning optimally.      Also, her nutritional status is not improving as I would have liked.  I discussed some options for invasive support,  but she was a firm about the fact that she is eating well and supplementing her diet with Ensure and I think she is probably right about that.  We will just have to wait and how her liver recovers over the next few weeks.      For the time being, her surgery that was tentatively scheduled for next week will be delayed until she improves her albumin ideally above 3.5 and we can get her bilirubin normalized.  I will plan to follow up with Ana after her next drain check.  In addition, she will followup labs later this week as well to track her progress    A total of 40 minutes was spent on the phone with Ms. Dupree, all of which was in consultation today.          Again, thank you for allowing me to participate in the care of your patient.        Sincerely,        Saman Simmons MD

## 2021-07-19 NOTE — PROGRESS NOTES
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 Failed ERCP at [facility] and then had PTC placed. First PTC did not work so had it replaced.     Trying to get her to surgery but she is severely hypoalbuminemic and her bili is very slow to improve, so we can not.     Can you get her in for a tube check asap?  ]    ===    See imaging PROCEDURE: IMAGING GUIDED TUBE CHOLANGIOGRAM WITH INTERNAL/EXTERNAL  BILIARY DRAINAGE CATHETER EXCHANGE.  -6/28/2021        ===    Review of Epic entered chart data shows the patient is on no anticoagulation      Complete Blood Count:  Lab Results   Component Value Date     06/28/2021       Coagulation:  Lab Results   Component Value Date    INR 0.95 06/28/2021         COVID-19 PCR results = unknown    ===    Referring provider: Saman Simmons MD.    IR referral, nurse signed, request for biliary drain check/change.    Urgent procedure scheduling approved by IR Dr. Lul Vicente.    I will forward to procedure schedulers.

## 2021-07-20 ENCOUNTER — TELEPHONE (OUTPATIENT)
Dept: INTERVENTIONAL RADIOLOGY/VASCULAR | Facility: CLINIC | Age: 66
End: 2021-07-20

## 2021-07-21 ENCOUNTER — LAB (OUTPATIENT)
Dept: URGENT CARE | Facility: URGENT CARE | Age: 66
End: 2021-07-21
Attending: PHYSICIAN ASSISTANT
Payer: COMMERCIAL

## 2021-07-21 DIAGNOSIS — K83.1 BILIARY OBSTRUCTION (H): ICD-10-CM

## 2021-07-21 DIAGNOSIS — Z11.59 ENCOUNTER FOR SCREENING FOR OTHER VIRAL DISEASES: ICD-10-CM

## 2021-07-21 LAB — SARS-COV-2 RNA RESP QL NAA+PROBE: NEGATIVE

## 2021-07-21 PROCEDURE — U0003 INFECTIOUS AGENT DETECTION BY NUCLEIC ACID (DNA OR RNA); SEVERE ACUTE RESPIRATORY SYNDROME CORONAVIRUS 2 (SARS-COV-2) (CORONAVIRUS DISEASE [COVID-19]), AMPLIFIED PROBE TECHNIQUE, MAKING USE OF HIGH THROUGHPUT TECHNOLOGIES AS DESCRIBED BY CMS-2020-01-R: HCPCS

## 2021-07-21 PROCEDURE — U0005 INFEC AGEN DETEC AMPLI PROBE: HCPCS

## 2021-07-22 ENCOUNTER — DOCUMENTATION ONLY (OUTPATIENT)
Dept: SURGERY | Facility: CLINIC | Age: 66
End: 2021-07-22

## 2021-07-22 NOTE — PROGRESS NOTES
Canceled surgery on 7/28 with Dr. Simmons per instructions from [facility]. Orders saved for rescheduling when able.    Canceled COVID test and post-op as well.    Writer is not needed to contact patient.

## 2021-07-23 ENCOUNTER — TELEPHONE (OUTPATIENT)
Dept: OBGYN | Facility: CLINIC | Age: 66
End: 2021-07-23

## 2021-07-23 ENCOUNTER — HOSPITAL ENCOUNTER (EMERGENCY)
Facility: CLINIC | Age: 66
Discharge: HOME | End: 2021-07-23
Attending: EMERGENCY MEDICINE | Admitting: EMERGENCY MEDICINE
Payer: COMMERCIAL

## 2021-07-23 ENCOUNTER — APPOINTMENT (OUTPATIENT)
Dept: CT IMAGING | Facility: CLINIC | Age: 66
End: 2021-07-23
Attending: EMERGENCY MEDICINE
Payer: COMMERCIAL

## 2021-07-23 VITALS
TEMPERATURE: 99.3 F | RESPIRATION RATE: 16 BRPM | OXYGEN SATURATION: 96 % | HEART RATE: 63 BPM | DIASTOLIC BLOOD PRESSURE: 59 MMHG | SYSTOLIC BLOOD PRESSURE: 104 MMHG

## 2021-07-23 DIAGNOSIS — N39.0 URINARY TRACT INFECTION WITHOUT HEMATURIA, SITE UNSPECIFIED: ICD-10-CM

## 2021-07-23 DIAGNOSIS — D72.829 LEUKOCYTOSIS, UNSPECIFIED TYPE: ICD-10-CM

## 2021-07-23 DIAGNOSIS — R10.13 ABDOMINAL PAIN, EPIGASTRIC: ICD-10-CM

## 2021-07-23 DIAGNOSIS — K83.1 BILIARY OBSTRUCTION (H): ICD-10-CM

## 2021-07-23 LAB
ALBUMIN SERPL-MCNC: 3 G/DL (ref 3.4–5)
ALBUMIN UR-MCNC: NEGATIVE MG/DL
ALP SERPL-CCNC: 151 U/L (ref 40–150)
ALT SERPL W P-5'-P-CCNC: 267 U/L (ref 0–50)
ANION GAP SERPL CALCULATED.3IONS-SCNC: 4 MMOL/L (ref 3–14)
APPEARANCE UR: CLEAR
AST SERPL W P-5'-P-CCNC: 185 U/L (ref 0–45)
BASOPHILS # BLD AUTO: 0.1 10E3/UL (ref 0–0.2)
BASOPHILS NFR BLD AUTO: 0 %
BILIRUB SERPL-MCNC: 5.9 MG/DL (ref 0.2–1.3)
BILIRUB UR QL STRIP: NEGATIVE
BUN SERPL-MCNC: 17 MG/DL (ref 7–30)
CALCIUM SERPL-MCNC: 9.6 MG/DL (ref 8.5–10.1)
CHLORIDE BLD-SCNC: 105 MMOL/L (ref 94–109)
CO2 SERPL-SCNC: 26 MMOL/L (ref 20–32)
COLOR UR AUTO: YELLOW
CREAT SERPL-MCNC: 0.57 MG/DL (ref 0.52–1.04)
EOSINOPHIL # BLD AUTO: 0 10E3/UL (ref 0–0.7)
EOSINOPHIL NFR BLD AUTO: 0 %
ERYTHROCYTE [DISTWIDTH] IN BLOOD BY AUTOMATED COUNT: 15.9 % (ref 10–15)
GFR SERPL CREATININE-BSD FRML MDRD: >90 ML/MIN/1.73M2
GLUCOSE BLD-MCNC: 107 MG/DL (ref 70–99)
GLUCOSE UR STRIP-MCNC: NEGATIVE MG/DL
HCT VFR BLD AUTO: 35 % (ref 35–47)
HGB BLD-MCNC: 11.3 G/DL (ref 11.7–15.7)
HGB UR QL STRIP: ABNORMAL
IMM GRANULOCYTES # BLD: 0.1 10E3/UL
IMM GRANULOCYTES NFR BLD: 1 %
KETONES UR STRIP-MCNC: NEGATIVE MG/DL
LACTATE SERPL-SCNC: 0.9 MMOL/L (ref 0.7–2)
LEUKOCYTE ESTERASE UR QL STRIP: ABNORMAL
LIPASE SERPL-CCNC: 681 U/L (ref 73–393)
LYMPHOCYTES # BLD AUTO: 1 10E3/UL (ref 0.8–5.3)
LYMPHOCYTES NFR BLD AUTO: 7 %
MCH RBC QN AUTO: 29.3 PG (ref 26.5–33)
MCHC RBC AUTO-ENTMCNC: 32.3 G/DL (ref 31.5–36.5)
MCV RBC AUTO: 91 FL (ref 78–100)
MONOCYTES # BLD AUTO: 0.6 10E3/UL (ref 0–1.3)
MONOCYTES NFR BLD AUTO: 5 %
MUCOUS THREADS #/AREA URNS LPF: PRESENT /LPF
NEUTROPHILS # BLD AUTO: 11.8 10E3/UL (ref 1.6–8.3)
NEUTROPHILS NFR BLD AUTO: 87 %
NITRATE UR QL: NEGATIVE
NRBC # BLD AUTO: 0 10E3/UL
NRBC BLD AUTO-RTO: 0 /100
PH UR STRIP: 5.5 [PH] (ref 5–7)
PLATELET # BLD AUTO: 346 10E3/UL (ref 150–450)
POTASSIUM BLD-SCNC: 4.1 MMOL/L (ref 3.4–5.3)
PROT SERPL-MCNC: 8.1 G/DL (ref 6.8–8.8)
RBC # BLD AUTO: 3.86 10E6/UL (ref 3.8–5.2)
RBC URINE: 7 /HPF
SODIUM SERPL-SCNC: 135 MMOL/L (ref 133–144)
SP GR UR STRIP: 1.01 (ref 1–1.03)
SQUAMOUS EPITHELIAL: 6 /HPF
UROBILINOGEN UR STRIP-MCNC: NORMAL MG/DL
WBC # BLD AUTO: 13.5 10E3/UL (ref 4–11)
WBC URINE: 37 /HPF

## 2021-07-23 PROCEDURE — 250N000009 HC RX 250: Performed by: EMERGENCY MEDICINE

## 2021-07-23 PROCEDURE — 74177 CT ABD & PELVIS W/CONTRAST: CPT

## 2021-07-23 PROCEDURE — 250N000011 HC RX IP 250 OP 636: Performed by: EMERGENCY MEDICINE

## 2021-07-23 PROCEDURE — 83605 ASSAY OF LACTIC ACID: CPT | Performed by: EMERGENCY MEDICINE

## 2021-07-23 PROCEDURE — 250N000013 HC RX MED GY IP 250 OP 250 PS 637: Performed by: EMERGENCY MEDICINE

## 2021-07-23 PROCEDURE — 99285 EMERGENCY DEPT VISIT HI MDM: CPT | Mod: 25

## 2021-07-23 PROCEDURE — 85025 COMPLETE CBC W/AUTO DIFF WBC: CPT | Performed by: EMERGENCY MEDICINE

## 2021-07-23 PROCEDURE — 96374 THER/PROPH/DIAG INJ IV PUSH: CPT

## 2021-07-23 PROCEDURE — 87086 URINE CULTURE/COLONY COUNT: CPT | Performed by: EMERGENCY MEDICINE

## 2021-07-23 PROCEDURE — 80053 COMPREHEN METABOLIC PANEL: CPT | Performed by: EMERGENCY MEDICINE

## 2021-07-23 PROCEDURE — 36415 COLL VENOUS BLD VENIPUNCTURE: CPT | Performed by: EMERGENCY MEDICINE

## 2021-07-23 PROCEDURE — 81001 URINALYSIS AUTO W/SCOPE: CPT | Performed by: EMERGENCY MEDICINE

## 2021-07-23 PROCEDURE — 83690 ASSAY OF LIPASE: CPT | Performed by: EMERGENCY MEDICINE

## 2021-07-23 PROCEDURE — 96375 TX/PRO/DX INJ NEW DRUG ADDON: CPT

## 2021-07-23 RX ORDER — METRONIDAZOLE 500 MG/1
500 TABLET ORAL ONCE
Status: COMPLETED | OUTPATIENT
Start: 2021-07-23 | End: 2021-07-23

## 2021-07-23 RX ORDER — ONDANSETRON 4 MG/1
4 TABLET, ORALLY DISINTEGRATING ORAL ONCE
Status: COMPLETED | OUTPATIENT
Start: 2021-07-23 | End: 2021-07-23

## 2021-07-23 RX ORDER — OXYCODONE HYDROCHLORIDE 5 MG/1
5 TABLET ORAL ONCE
Status: COMPLETED | OUTPATIENT
Start: 2021-07-23 | End: 2021-07-23

## 2021-07-23 RX ORDER — HYDROMORPHONE HYDROCHLORIDE 1 MG/ML
.5-1 INJECTION, SOLUTION INTRAMUSCULAR; INTRAVENOUS; SUBCUTANEOUS
Status: DISCONTINUED | OUTPATIENT
Start: 2021-07-23 | End: 2021-07-24 | Stop reason: HOSPADM

## 2021-07-23 RX ORDER — ONDANSETRON 4 MG/1
4 TABLET, ORALLY DISINTEGRATING ORAL EVERY 6 HOURS PRN
Qty: 20 TABLET | Refills: 0 | Status: SHIPPED | OUTPATIENT
Start: 2021-07-23 | End: 2022-01-19

## 2021-07-23 RX ORDER — OXYCODONE HYDROCHLORIDE 5 MG/1
5-10 TABLET ORAL EVERY 6 HOURS PRN
Qty: 20 TABLET | Refills: 0 | Status: ON HOLD | OUTPATIENT
Start: 2021-07-23 | End: 2023-12-05

## 2021-07-23 RX ORDER — CIPROFLOXACIN 500 MG/1
500 TABLET, FILM COATED ORAL ONCE
Status: COMPLETED | OUTPATIENT
Start: 2021-07-23 | End: 2021-07-23

## 2021-07-23 RX ORDER — CIPROFLOXACIN 500 MG/1
500 TABLET, FILM COATED ORAL 2 TIMES DAILY
Qty: 20 TABLET | Refills: 0 | Status: SHIPPED | OUTPATIENT
Start: 2021-07-23 | End: 2021-08-02

## 2021-07-23 RX ORDER — SENNA AND DOCUSATE SODIUM 50; 8.6 MG/1; MG/1
1-2 TABLET, FILM COATED ORAL 2 TIMES DAILY PRN
Qty: 30 TABLET | Refills: 0 | Status: SHIPPED | OUTPATIENT
Start: 2021-07-23 | End: 2021-08-22

## 2021-07-23 RX ORDER — HYDROMORPHONE HYDROCHLORIDE 1 MG/ML
.5-1 INJECTION, SOLUTION INTRAMUSCULAR; INTRAVENOUS; SUBCUTANEOUS
Status: COMPLETED | OUTPATIENT
Start: 2021-07-23 | End: 2021-07-23

## 2021-07-23 RX ORDER — METRONIDAZOLE 500 MG/1
500 TABLET ORAL 3 TIMES DAILY
Qty: 30 TABLET | Refills: 0 | Status: SHIPPED | OUTPATIENT
Start: 2021-07-23 | End: 2021-08-02

## 2021-07-23 RX ORDER — ONDANSETRON 2 MG/ML
4 INJECTION INTRAMUSCULAR; INTRAVENOUS EVERY 30 MIN PRN
Status: DISCONTINUED | OUTPATIENT
Start: 2021-07-23 | End: 2021-07-24 | Stop reason: HOSPADM

## 2021-07-23 RX ORDER — IOPAMIDOL 755 MG/ML
91 INJECTION, SOLUTION INTRAVASCULAR ONCE
Status: COMPLETED | OUTPATIENT
Start: 2021-07-23 | End: 2021-07-23

## 2021-07-23 RX ADMIN — ONDANSETRON 4 MG: 2 INJECTION INTRAMUSCULAR; INTRAVENOUS at 19:23

## 2021-07-23 RX ADMIN — IOPAMIDOL 91 ML: 755 INJECTION, SOLUTION INTRAVENOUS at 20:20

## 2021-07-23 RX ADMIN — HYDROMORPHONE HYDROCHLORIDE 1 MG: 1 INJECTION, SOLUTION INTRAMUSCULAR; INTRAVENOUS; SUBCUTANEOUS at 19:24

## 2021-07-23 RX ADMIN — SODIUM CHLORIDE 66 ML: 9 INJECTION, SOLUTION INTRAVENOUS at 20:20

## 2021-07-23 RX ADMIN — ONDANSETRON 4 MG: 4 TABLET, ORALLY DISINTEGRATING ORAL at 22:50

## 2021-07-23 RX ADMIN — METRONIDAZOLE 500 MG: 500 TABLET, FILM COATED ORAL at 22:51

## 2021-07-23 RX ADMIN — OXYCODONE HYDROCHLORIDE 5 MG: 5 TABLET ORAL at 22:53

## 2021-07-23 RX ADMIN — CIPROFLOXACIN HYDROCHLORIDE 500 MG: 500 TABLET, FILM COATED ORAL at 22:53

## 2021-07-23 ASSESSMENT — ENCOUNTER SYMPTOMS
SHORTNESS OF BREATH: 0
ABDOMINAL PAIN: 1
VOMITING: 0

## 2021-07-23 NOTE — ED PROVIDER NOTES
History   Chief Complaint:  Abdominal Pain     HPI   Ana Dupree is a 65 year old female with history of duodenal cancer, hyperlipidemia and hypertension who presents with abdominal pain. Patient started feeling sharp upper left quadrant abdominal pain around 1200 this afternoon after potentially dislodging her biliary drain. Patient has a history of duodenal cancer with obstruction of the the bile duct. She reports that she has had stenting of the duct and currently believes that the tubing has been pulled out after getting the tube caught on a drawer handle just prior to her pain beginning today. She reports she is currently receiving her oncology care at [facility] and will be meeting with oncology and IR in the coming weeks. Patient has had irregular bowel movements with discoloration and this has been ongoing for some time. Denies any vomiting, chest pain, or shortness of breath.     Review of Systems   Respiratory: Negative for shortness of breath.    Cardiovascular: Negative for chest pain.   Gastrointestinal: Positive for abdominal pain. Negative for vomiting.   All other systems reviewed and are negative.        Allergies:  No known drug allergies    Medications:  Calcium  diphenhydramine  Iron  lisdexamfetamine   lisinopril   omeprazole   oxycodon  sodium chloride  vitamin D3     Past Medical History:    Hypertension  Hyperlipidemia  Hyperparathyroidism  Obesity  Osteoporosis  Depression   Sleep apnea    Duodenal cancer     Past Surgical History:    Biopsy breast  Colonoscopy  Endoscopic retrograde cholangiopancreatogram  IR biliary drain placement  Laparoscopic cholecystectomy  Lithotripsy  Mammoplasty reduction  Tubal ligation    Family History:    Hypertension  Cerebrovascular disease  Diabetes  Hyperlipidemia  CAD  Cancer    Social History:  Patient presents alone. No alcohol or tobacco use.     Physical Exam     Patient Vitals for the past 24 hrs:   BP Temp Temp src Pulse Resp SpO2    07/23/21 2200 104/59 -- -- 63 -- 96 %   07/23/21 2130 127/64 -- -- 59 -- 97 %   07/23/21 2106 124/60 -- -- -- -- 97 %   07/23/21 2000 123/65 -- -- 63 -- 96 %   07/23/21 1625 124/60 99.3  F (37.4  C) Temporal 61 16 100 %       Physical Exam  Constitutional: Well developed, nontox appearance, appears uncomfortable  Head: Atraumatic.   Mouth/Throat: Oropharynx is clear and moist.   Eyes: no scleral icterus  Cardiovascular: RRR, 2+ bilat radial pulses  Pulmonary/Chest: nml resp effort, Clear BS bilat  Abdominal: Biliary drain tube intact, ND, soft, diffuse tenderness worse in the epigastrium, no rebound or guarding   : no CVA tenderness bilat  Ext: Warm, well perfused, no edema  Neurological: A&O, symmetric facies, moves ext x4  Skin: Skin is warm and dry.   Psychiatric: Behavior is normal. Thought content normal.   Nursing note and vitals reviewed.    Emergency Department Course   Imaging:  CT abdomen pelvis w contrast  1.  Stable and appropriate positioning of the percutaneous biliary  drain. Minimal edema adjacent to the course of the drain within the  subcutaneous tissues, but no focal fluid collection is seen. No  abscess identified.  2.  Stable mild to moderate left hydronephrosis with no obstructing  etiology seen. Stable left intrarenal stones.  3.  Stable small nodules near the pancreas and stomach at the left  upper quadrant that are technically indeterminant. Necrotic lymph  nodes or  evolving infectious or inflammatory etiologies may be  possible. Neoplastic etiology not excluded.  4.  No other acute abnormality  Per radiology    Laboratory:   CBC: WBC 13.5 (H), HGB 11.3 (L),   Lactic acid (result time 1905) 0.9    Lipase: 681 (H)  CMP: glucose 107 (H), alkaline phosphatase 151 (H),  (H),  (H), albumin 3.0 (L), bilirubin 5.9 (H), o/w WNL (Creatinine 0.57)     UA with microscopic: blood urine: trace, leukocyte esterase: large, mucus: present, RBC 7 (H), WBC 37 (H), squamous  epithelials 6 (H), o/w WNL     Urine Culture: Pending     Emergency Department Course:    Reviewed:  I reviewed nursing notes, vitals and past medical history    Assessments:   I obtained history and examined the patient as noted above.    I rechecked the patient and explained findings.     Interventions:   Hydromorphone injection, 1 mg, IV   Ondansetron, 4 mg, IV  2250 Zofran 4 mg oral   225 Flagyl 500 mg Oral   2253 Roxicodone 5 mg Oral  2253 Cipro 500 mg Oral     Disposition:  The patient was discharged to home.       Impression & Plan   CMS Diagnoses: none    Medical Decision Makin year old female presenting w/ abdominal pain with concern for malpositioning of biliary drain     DDx includes dislodgment of biliary drain, acute pancreatitis, hepatitis, infection, fluid collection, intra-abdominal abscess, abdominal pain NOS.  Vital signs reassuring.  Given patient's history and concern for dislodgment, CT obtained which demonstrated appropriate positioning of biliary drain and mild edema along tract.  Stitches were noted to be broken on the biliary drain and is possible the patient dislodged some of the excess tubing was previously subcutaneous.  Given appropriate positioning, I do not feel IR tube exchange is indicated emergently.  Tubing was reinforced and secured.  Labs significant for possibly UTI although the patient is not having urinary symptoms, leukocytosis.  Interval elevation of lipase but bilirubin continues to downtrend.  I think would be appropriate to cover the patient with antibiotics with first doses given in the emergency department as noted above.  She has follow-up at [facility] in approximately 1 week and is given strict return precautions for follow-up in the emergency department in the interim.  Temperature recheck by MD was 99.2  F just prior to discharge.  At this time I feel the pt is safe for discharge.  Recommendations given regarding follow up with physician team at  [facility] and return to the emergency department as needed for new or worsening symptoms.  Patient and son counseled on all results, disposition and diagnosis.  They are understanding and agreeable to plan. Patient discharged in stable condition.     Covid-19  Ana Dupree was evaluated during a global COVID-19 pandemic, which necessitated consideration that the patient might be at risk for infection with the SARS-CoV-2 virus that causes COVID-19.   Applicable protocols for evaluation were followed during the patient's care.     Diagnosis:    ICD-10-CM    1. Abdominal pain, epigastric  R10.13    2. Biliary obstruction  K83.1    3. Urinary tract infection without hematuria, site unspecified  N39.0    4. Leukocytosis, unspecified type  D72.829        Discharge Medications:  New Prescriptions    CIPROFLOXACIN (CIPRO) 500 MG TABLET    Take 1 tablet (500 mg) by mouth 2 times daily for 10 days    METRONIDAZOLE (FLAGYL) 500 MG TABLET    Take 1 tablet (500 mg) by mouth 3 times daily for 10 days    ONDANSETRON (ZOFRAN ODT) 4 MG ODT TAB    Take 1 tablet (4 mg) by mouth every 6 hours as needed for nausea or vomiting    OXYCODONE (ROXICODONE) 5 MG TABLET    Take 1-2 tablets (5-10 mg) by mouth every 6 hours as needed for breakthrough pain or pain    SENNA-DOCUSATE SODIUM (SENNA S) 8.6-50 MG TABLET    Take 1-2 tablets by mouth 2 times daily as needed (if taking oxycodone)       Scribe Disclosure:  IUma, am serving as a scribe at 6:00 PM on 7/23/2021 to document services personally performed by Lawson Verma MD based on my observations and the provider's statements to me.     IAndrea, am serving as a scribe () on 7/23/2021 at 11:00 PM to personally document services performed by Lawson Verma MD based on my observations and the provider's statements to me.          Lawson Verma MD  07/24/21 0315

## 2021-07-24 NOTE — DISCHARGE INSTRUCTIONS
Please follow-up at [facility] as previously instructed.    Please take medications as prescribed and finish entire antibiotic courses.    Please return to the emergency department as needed for new or worsening symptoms including fever greater than 100.4  F, vomiting unable to keep anything down, severe and uncontrollable pain, fainting, severe shortness of breath, any other concerning symptoms.

## 2021-07-25 LAB
BACTERIA UR CULT: NORMAL
BACTERIA UR CULT: NORMAL

## 2021-07-25 NOTE — RESULT ENCOUNTER NOTE
Final urine culture report is negative.  Adult Negative Urine culture parameters per protocol: Any # Urogenital single or mixed organism, <10,000 col/ml single organism (cath specimen), and <50,000 col/ml single organism (midstream or cath specimen).  [facility] discharge antibiotic prescribed (If applicable): Cipro  Treatment recommendations per [facility] ED Lab Result Urine Culture protocol.

## 2021-08-04 ENCOUNTER — LAB (OUTPATIENT)
Dept: LAB | Facility: CLINIC | Age: 66
End: 2021-08-04
Payer: COMMERCIAL

## 2021-08-04 DIAGNOSIS — K56.609 SMALL BOWEL OBSTRUCTION (H): Primary | ICD-10-CM

## 2021-10-24 ENCOUNTER — HEALTH MAINTENANCE LETTER (OUTPATIENT)
Age: 66
End: 2021-10-24

## 2021-12-19 ENCOUNTER — HEALTH MAINTENANCE LETTER (OUTPATIENT)
Age: 66
End: 2021-12-19

## 2022-01-18 ENCOUNTER — TRANSFERRED RECORDS (OUTPATIENT)
Dept: HEALTH INFORMATION MANAGEMENT | Facility: CLINIC | Age: 67
End: 2022-01-18
Payer: COMMERCIAL

## 2022-01-19 ENCOUNTER — APPOINTMENT (OUTPATIENT)
Dept: CT IMAGING | Facility: CLINIC | Age: 67
DRG: 389 | End: 2022-01-19
Attending: EMERGENCY MEDICINE
Payer: COMMERCIAL

## 2022-01-19 ENCOUNTER — HOSPITAL ENCOUNTER (INPATIENT)
Facility: CLINIC | Age: 67
LOS: 2 days | Discharge: HOME | DRG: 389 | End: 2022-01-21
Attending: EMERGENCY MEDICINE | Admitting: HOSPITALIST
Payer: COMMERCIAL

## 2022-01-19 DIAGNOSIS — K56.609 SMALL BOWEL OBSTRUCTION (H): ICD-10-CM

## 2022-01-19 DIAGNOSIS — C24.1 CARCINOMA OF AMPULLA OF VATER (H): ICD-10-CM

## 2022-01-19 DIAGNOSIS — C78.7 LIVER METASTASES: ICD-10-CM

## 2022-01-19 LAB
ALBUMIN SERPL-MCNC: 3 G/DL (ref 3.4–5)
ALP SERPL-CCNC: 97 U/L (ref 40–150)
ALT SERPL W P-5'-P-CCNC: 26 U/L (ref 0–50)
ANION GAP SERPL CALCULATED.3IONS-SCNC: 6 MMOL/L (ref 3–14)
AST SERPL W P-5'-P-CCNC: 18 U/L (ref 0–45)
BASOPHILS # BLD AUTO: 0 10E3/UL (ref 0–0.2)
BASOPHILS NFR BLD AUTO: 1 %
BILIRUB SERPL-MCNC: 1.1 MG/DL (ref 0.2–1.3)
BUN SERPL-MCNC: 10 MG/DL (ref 7–30)
CALCIUM SERPL-MCNC: 8.7 MG/DL (ref 8.5–10.1)
CHLORIDE BLD-SCNC: 102 MMOL/L (ref 94–109)
CO2 SERPL-SCNC: 25 MMOL/L (ref 20–32)
CREAT SERPL-MCNC: 0.5 MG/DL (ref 0.52–1.04)
EOSINOPHIL # BLD AUTO: 0 10E3/UL (ref 0–0.7)
EOSINOPHIL NFR BLD AUTO: 1 %
ERYTHROCYTE [DISTWIDTH] IN BLOOD BY AUTOMATED COUNT: 14 % (ref 10–15)
GFR SERPL CREATININE-BSD FRML MDRD: >90 ML/MIN/1.73M2
GLUCOSE BLD-MCNC: 77 MG/DL (ref 70–99)
HCT VFR BLD AUTO: 36.7 % (ref 35–47)
HGB BLD-MCNC: 11.4 G/DL (ref 11.7–15.7)
IMM GRANULOCYTES # BLD: 0 10E3/UL
IMM GRANULOCYTES NFR BLD: 0 %
LIPASE SERPL-CCNC: 31 U/L (ref 73–393)
LYMPHOCYTES # BLD AUTO: 1.3 10E3/UL (ref 0.8–5.3)
LYMPHOCYTES NFR BLD AUTO: 18 %
MCH RBC QN AUTO: 29.6 PG (ref 26.5–33)
MCHC RBC AUTO-ENTMCNC: 31.1 G/DL (ref 31.5–36.5)
MCV RBC AUTO: 95 FL (ref 78–100)
MONOCYTES # BLD AUTO: 0.6 10E3/UL (ref 0–1.3)
MONOCYTES NFR BLD AUTO: 9 %
NEUTROPHILS # BLD AUTO: 5.1 10E3/UL (ref 1.6–8.3)
NEUTROPHILS NFR BLD AUTO: 71 %
NRBC # BLD AUTO: 0 10E3/UL
NRBC BLD AUTO-RTO: 0 /100
PLATELET # BLD AUTO: 212 10E3/UL (ref 150–450)
POTASSIUM BLD-SCNC: 3.8 MMOL/L (ref 3.4–5.3)
PROT SERPL-MCNC: 6.3 G/DL (ref 6.8–8.8)
RBC # BLD AUTO: 3.85 10E6/UL (ref 3.8–5.2)
SARS-COV-2 RNA RESP QL NAA+PROBE: NEGATIVE
SODIUM SERPL-SCNC: 133 MMOL/L (ref 133–144)
WBC # BLD AUTO: 7.1 10E3/UL (ref 4–11)

## 2022-01-19 PROCEDURE — 80053 COMPREHEN METABOLIC PANEL: CPT | Performed by: EMERGENCY MEDICINE

## 2022-01-19 PROCEDURE — 96361 HYDRATE IV INFUSION ADD-ON: CPT

## 2022-01-19 PROCEDURE — C9803 HOPD COVID-19 SPEC COLLECT: HCPCS

## 2022-01-19 PROCEDURE — 96375 TX/PRO/DX INJ NEW DRUG ADDON: CPT

## 2022-01-19 PROCEDURE — 83690 ASSAY OF LIPASE: CPT | Performed by: EMERGENCY MEDICINE

## 2022-01-19 PROCEDURE — 250N000011 HC RX IP 250 OP 636: Performed by: EMERGENCY MEDICINE

## 2022-01-19 PROCEDURE — 258N000003 HC RX IP 258 OP 636: Performed by: EMERGENCY MEDICINE

## 2022-01-19 PROCEDURE — 99285 EMERGENCY DEPT VISIT HI MDM: CPT | Mod: 25

## 2022-01-19 PROCEDURE — 99223 1ST HOSP IP/OBS HIGH 75: CPT | Mod: AI | Performed by: HOSPITALIST

## 2022-01-19 PROCEDURE — 74177 CT ABD & PELVIS W/CONTRAST: CPT

## 2022-01-19 PROCEDURE — 36415 COLL VENOUS BLD VENIPUNCTURE: CPT | Performed by: EMERGENCY MEDICINE

## 2022-01-19 PROCEDURE — 85004 AUTOMATED DIFF WBC COUNT: CPT | Performed by: EMERGENCY MEDICINE

## 2022-01-19 PROCEDURE — 96374 THER/PROPH/DIAG INJ IV PUSH: CPT

## 2022-01-19 PROCEDURE — 250N000013 HC RX MED GY IP 250 OP 250 PS 637: Performed by: HOSPITALIST

## 2022-01-19 PROCEDURE — 87635 SARS-COV-2 COVID-19 AMP PRB: CPT | Performed by: EMERGENCY MEDICINE

## 2022-01-19 PROCEDURE — 250N000009 HC RX 250: Performed by: EMERGENCY MEDICINE

## 2022-01-19 PROCEDURE — 120N000001 HC R&B MED SURG/OB

## 2022-01-19 RX ORDER — NALOXONE HYDROCHLORIDE 0.4 MG/ML
0.4 INJECTION, SOLUTION INTRAMUSCULAR; INTRAVENOUS; SUBCUTANEOUS
Status: DISCONTINUED | OUTPATIENT
Start: 2022-01-19 | End: 2022-01-21 | Stop reason: HOSPADM

## 2022-01-19 RX ORDER — ONDANSETRON 4 MG/1
4 TABLET, ORALLY DISINTEGRATING ORAL EVERY 6 HOURS PRN
Status: DISCONTINUED | OUTPATIENT
Start: 2022-01-19 | End: 2022-01-21 | Stop reason: HOSPADM

## 2022-01-19 RX ORDER — ACETAMINOPHEN 500 MG
1000 TABLET ORAL EVERY 8 HOURS PRN
Status: ON HOLD | COMMUNITY
End: 2023-12-05

## 2022-01-19 RX ORDER — BISACODYL 10 MG
10 SUPPOSITORY, RECTAL RECTAL DAILY PRN
Status: DISCONTINUED | OUTPATIENT
Start: 2022-01-19 | End: 2022-01-21 | Stop reason: HOSPADM

## 2022-01-19 RX ORDER — IOPAMIDOL 755 MG/ML
75 INJECTION, SOLUTION INTRAVASCULAR ONCE
Status: COMPLETED | OUTPATIENT
Start: 2022-01-19 | End: 2022-01-19

## 2022-01-19 RX ORDER — PANTOPRAZOLE SODIUM 40 MG/1
40 TABLET, DELAYED RELEASE ORAL DAILY
Status: DISCONTINUED | OUTPATIENT
Start: 2022-01-20 | End: 2022-01-21 | Stop reason: HOSPADM

## 2022-01-19 RX ORDER — ONDANSETRON 2 MG/ML
4 INJECTION INTRAMUSCULAR; INTRAVENOUS EVERY 6 HOURS PRN
Status: DISCONTINUED | OUTPATIENT
Start: 2022-01-19 | End: 2022-01-21 | Stop reason: HOSPADM

## 2022-01-19 RX ORDER — ONDANSETRON 2 MG/ML
4 INJECTION INTRAMUSCULAR; INTRAVENOUS EVERY 30 MIN PRN
Status: DISCONTINUED | OUTPATIENT
Start: 2022-01-19 | End: 2022-01-19

## 2022-01-19 RX ORDER — NALOXONE HYDROCHLORIDE 0.4 MG/ML
0.2 INJECTION, SOLUTION INTRAMUSCULAR; INTRAVENOUS; SUBCUTANEOUS
Status: DISCONTINUED | OUTPATIENT
Start: 2022-01-19 | End: 2022-01-21 | Stop reason: HOSPADM

## 2022-01-19 RX ORDER — ONDANSETRON 8 MG/1
8 TABLET, ORALLY DISINTEGRATING ORAL EVERY 8 HOURS PRN
Status: ON HOLD | COMMUNITY
Start: 2021-11-23 | End: 2023-12-05

## 2022-01-19 RX ORDER — HYDROMORPHONE HYDROCHLORIDE 1 MG/ML
0.5 INJECTION, SOLUTION INTRAMUSCULAR; INTRAVENOUS; SUBCUTANEOUS ONCE
Status: COMPLETED | OUTPATIENT
Start: 2022-01-19 | End: 2022-01-19

## 2022-01-19 RX ORDER — HYDROMORPHONE HCL IN WATER/PF 6 MG/30 ML
0.2 PATIENT CONTROLLED ANALGESIA SYRINGE INTRAVENOUS
Status: DISCONTINUED | OUTPATIENT
Start: 2022-01-19 | End: 2022-01-21 | Stop reason: HOSPADM

## 2022-01-19 RX ORDER — PROCHLORPERAZINE MALEATE 5 MG
5 TABLET ORAL EVERY 6 HOURS PRN
Status: DISCONTINUED | OUTPATIENT
Start: 2022-01-19 | End: 2022-01-21 | Stop reason: HOSPADM

## 2022-01-19 RX ORDER — MULTIVIT WITH MINERALS/LUTEIN
1000 TABLET ORAL DAILY
COMMUNITY

## 2022-01-19 RX ORDER — LIDOCAINE 40 MG/G
CREAM TOPICAL
Status: DISCONTINUED | OUTPATIENT
Start: 2022-01-19 | End: 2022-01-21 | Stop reason: HOSPADM

## 2022-01-19 RX ORDER — ACETAMINOPHEN 500 MG
1000 TABLET ORAL EVERY 8 HOURS PRN
Status: DISCONTINUED | OUTPATIENT
Start: 2022-01-19 | End: 2022-01-21 | Stop reason: HOSPADM

## 2022-01-19 RX ORDER — LISINOPRIL 10 MG/1
10 TABLET ORAL DAILY
Status: DISCONTINUED | OUTPATIENT
Start: 2022-01-20 | End: 2022-01-21 | Stop reason: HOSPADM

## 2022-01-19 RX ORDER — OXYCODONE HYDROCHLORIDE 5 MG/1
5 TABLET ORAL EVERY 4 HOURS PRN
Status: DISCONTINUED | OUTPATIENT
Start: 2022-01-19 | End: 2022-01-21 | Stop reason: HOSPADM

## 2022-01-19 RX ORDER — FENTANYL CITRATE 50 UG/ML
50 INJECTION, SOLUTION INTRAMUSCULAR; INTRAVENOUS ONCE
Status: COMPLETED | OUTPATIENT
Start: 2022-01-19 | End: 2022-01-19

## 2022-01-19 RX ORDER — LISINOPRIL 20 MG/1
10 TABLET ORAL DAILY
Status: ON HOLD | COMMUNITY
End: 2023-12-05

## 2022-01-19 RX ORDER — PROCHLORPERAZINE 25 MG
12.5 SUPPOSITORY, RECTAL RECTAL EVERY 12 HOURS PRN
Status: DISCONTINUED | OUTPATIENT
Start: 2022-01-19 | End: 2022-01-21 | Stop reason: HOSPADM

## 2022-01-19 RX ORDER — SODIUM CHLORIDE 9 MG/ML
INJECTION, SOLUTION INTRAVENOUS CONTINUOUS
Status: DISCONTINUED | OUTPATIENT
Start: 2022-01-19 | End: 2022-01-19

## 2022-01-19 RX ORDER — PROCHLORPERAZINE MALEATE 10 MG
10 TABLET ORAL EVERY 6 HOURS PRN
Status: ON HOLD | COMMUNITY
Start: 2021-12-23 | End: 2023-12-05

## 2022-01-19 RX ADMIN — HYDROMORPHONE HYDROCHLORIDE 0.5 MG: 1 INJECTION, SOLUTION INTRAMUSCULAR; INTRAVENOUS; SUBCUTANEOUS at 16:26

## 2022-01-19 RX ADMIN — SODIUM CHLORIDE 1000 ML: 9 INJECTION, SOLUTION INTRAVENOUS at 12:52

## 2022-01-19 RX ADMIN — IOPAMIDOL 75 ML: 755 INJECTION, SOLUTION INTRAVENOUS at 13:31

## 2022-01-19 RX ADMIN — SODIUM CHLORIDE 62 ML: 900 INJECTION INTRAVENOUS at 13:31

## 2022-01-19 RX ADMIN — ACETAMINOPHEN 1000 MG: 500 TABLET ORAL at 20:27

## 2022-01-19 RX ADMIN — OXYCODONE HYDROCHLORIDE 5 MG: 5 TABLET ORAL at 20:27

## 2022-01-19 RX ADMIN — ONDANSETRON 4 MG: 2 INJECTION INTRAMUSCULAR; INTRAVENOUS at 12:51

## 2022-01-19 RX ADMIN — FENTANYL CITRATE 50 MCG: 50 INJECTION INTRAMUSCULAR; INTRAVENOUS at 12:51

## 2022-01-19 ASSESSMENT — ENCOUNTER SYMPTOMS
ABDOMINAL DISTENTION: 1
NAUSEA: 1
ABDOMINAL PAIN: 1
VOMITING: 1

## 2022-01-19 ASSESSMENT — ACTIVITIES OF DAILY LIVING (ADL)
ADLS_ACUITY_SCORE: 12
ADLS_ACUITY_SCORE: 3
ADLS_ACUITY_SCORE: 12

## 2022-01-19 ASSESSMENT — MIFFLIN-ST. JEOR: SCORE: 1146.9

## 2022-01-19 NOTE — PHARMACY-ADMISSION MEDICATION HISTORY
Pharmacy Medication History  Admission medication history interview status for the 1/19/2022  admission is complete. See EPIC admission navigator for prior to admission medications     Location of Interview: Patient room  Medication history sources: Patient, Surescripts, Pharmacy ([facility]) and Care Everywhere    Significant changes made to the medication list:  Added vitamin c, compazine, apap  Modified zofran, lisinopril  Removed benadryl, vyvanse    In the past week, patient estimated taking medication this percent of the time: greater than 90%    Additional medication history information:   Pt has been tapering Lisinopril dose down on her own.  Her bp is better under control after losing weight.    Medication reconciliation completed by provider prior to medication history? No    Time spent in this activity: 20 minutes    Prior to Admission medications    Medication Sig Last Dose Taking? Auth Provider   acetaminophen (TYLENOL) 500 MG tablet Take 1,000 mg by mouth every 8 hours as needed for mild pain 1/19/2022 at 0800 Yes Unknown, Entered By History   CALCIUM PO Take 1 tablet by mouth daily 1/19/2022 at am Yes Unknown, Entered By History   IRON PO Take 1 tablet by mouth daily  1/19/2022 at am Yes Reported, Patient   lisinopril (ZESTRIL) 20 MG tablet Take 10 mg by mouth daily 1/2 x 20mg = 10mg 1/19/2022 at am Yes Unknown, Entered By History   omeprazole (PRILOSEC) 20 MG DR capsule Take 20 mg by mouth daily 1/19/2022 at am Yes Unknown, Entered By History   ondansetron (ZOFRAN-ODT) 8 MG ODT tab Take 8 mg by mouth every 8 hours as needed for nausea or vomiting prn Yes Unknown, Entered By History   oxyCODONE (ROXICODONE) 5 MG tablet Take 1-2 tablets (5-10 mg) by mouth every 6 hours as needed for breakthrough pain or pain 1/19/2022 at 0130 Yes Lawson Verma MD   prochlorperazine (COMPAZINE) 10 MG tablet Take 10 mg by mouth every 6 hours as needed for nausea Nausea or vomiting unrelieved by ondansetron  1/18/2022 at Unknown time Yes Unknown, Entered By History   vitamin C (ASCORBIC ACID) 1000 MG TABS Take 1,000 mg by mouth daily 1/19/2022 at am Yes Unknown, Entered By History   vitamin D3 (CHOLECALCIFEROL) 50 mcg (2000 units) tablet Take 1 tablet by mouth daily 1/19/2022 at am Yes Reported, Patient       The information provided in this note is only as accurate as the sources available at the time of update(s)

## 2022-01-19 NOTE — H&P
[facility]    History and Physical - Hospitalist Service       Date of Admission:  1/19/2022    Assessment & Plan      Ana Dupree is a 66 year old female admitted on 1/19/2022.  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      Acute small bowel obstruction  Presents with several days intermittent cramping abdominal pain with mild nausea and bloating.  Admission CT showing mildly distended bowel with transition point in pelvis.  Hx of abd surgery as well as metastatic adenocarcinoma but no mass noted on CT.   - NPO except meds/ice chips  - prn analgesics and antiemetics  - encourage frequent ambulation  -  ml/hr  - will consult Gen Surg  - she requests we try to push images to [facility] for eval by her Oncology team     Adenocarcinoma of ampulla of Vater with mets to liver  Followed at [facility] as well as [facility].  Completed 6 cycles FOLFOX in Oct 2021, initiated pembrolizumab in Dec 2021.  - hold on oncology consult at this time    HTN  - continue PTA lisinopril     GERD   - continue PTA omeprazole    Hx HLD  Hx KINGS  Resolved with weight loss       Diet:   NPO  DVT Prophylaxis: Pneumatic Compression Devices  Chavez Catheter: Not present  Central Lines: PRESENT       Cardiac Monitoring: None  Code Status:   Full code per patient     Clinically Significant Risk Factors Present on Admission                # Overweight: last Body mass index is 29.29 kg/m .      Disposition Plan   Expected Discharge:  3 days    Anticipated discharge location:  Awaiting care coordination huddle  Delays:            The patient's care was discussed with the Patient.    Jack Fan MD  Hospitalist Service  [facility]  Securely message with the Vocera Web Console (learn more here)  Text page via [employer] Paging/Directory          ______________________________________________________________________    Chief Complaint   Abdominal pain     History is obtained from the patient, chart review and discussion with emergency room provider.    History of Present Illness   Ana Dupree is a 66 year old female who reports several days of abdominal pain and mild nausea.  She reports onset of a sharp epigastric pain on Monday night.  She took some MiraLAX without improvement, subsequently took some Tylenol and had an emesis.  She called her doctor who asked her to monitor her symptoms for 24 hours and take oxycodone which she had at home.  Due to lack of improvement in symptoms, she had outpatient abdominal x-ray through [facility] which was inconclusive, but concerning for early obstruction.  She declined admission at that time, so she was given IV fluids and antiemetics in clinic and went home.  She reports taking oxycodone every 4 hours due to persistent pain.  The clinic contacted her this morning to check-in, and due to persistence of symptoms it was recommended she present to the emergency room for evaluation.  She reports that her pain has been manageable with oxycodone.  She reports the pain reminds her of contractions of pregnancy and have persisted in her epigastrium.  Symptoms seem to be worse with movement, though she is agreeable to ambulating frequently.  She does endorse some mild bloating as well as mild nausea, but reports she has been passing a small amount of gas today.  She denies any lightheadedness otherwise has a negative review of systems.      Review of Systems    The 10 point Review of Systems is negative other than noted in the HPI or here.     Past Medical History    I have reviewed this patient's medical history and updated it with pertinent information if needed.   Past Medical History:   Diagnosis Date     Essential hypertension 11/25/2016     Hyperlipidemia     elevated triglycerides      Hyperparathyroidism (H) 06/01/2014    referred to endo when PTH/calcium still high 11/14 and 12/14. seeing Dr. leos. dexa wtih penia at hips and porosis wrist. disc. surgery 12/14     Hypertension      Morbid obesity (H)      Osteoporosis     osteopenia at hips and osteoporosis at wrist according to dexa done through endo referral     Osteoporosis, senile 11/25/2016     Primary adenocarcinoma of ampulla of Vater (H)      Renal stones      Situational depression      Sleep apnea 01/01/2009    Doesn't use a CPAP       Past Surgical History   I have reviewed this patient's surgical history and updated it with pertinent information if needed.  Past Surgical History:   Procedure Laterality Date     BIOPSY BREAST  11/2001, 07/07/2014    excisional left side-B9 fibrocystic change. stereotactic bx left at 2 oclock for calcs. path-atypical ductal proliferation     COLONOSCOPY N/A 1/19/2017    Procedure: COMBINED COLONOSCOPY, SINGLE OR MULTIPLE BIOPSY/POLYPECTOMY BY BIOPSY;  Surgeon: Jerald Grant MD;  Location:  GI     ENDOSCOPIC RETROGRADE CHOLANGIOPANCREATOGRAM N/A 6/20/2021    Procedure: ENDOSCOPIC RETROGRADE CHOLANGIOPANCREATOGRAPHY, WITH SPHINCTEROTOMY AND BIOPSY and Endoscopic Ultrasound;  Surgeon: Jerald Grant MD;  Location:  OR     ENDOSCOPIC RETROGRADE CHOLANGIOPANCREATOGRAM N/A 6/22/2021    Procedure: ENDOSCOPIC RETROGRADE CHOLANGIOPANCREATOGRAPHY and Sphincterotomy;  Surgeon: Jerald Grant MD;  Location:  OR     IR BILIARY DRAIN PLACEMENT  6/22/2021     IR BILIARY TUBE CHANGE  6/28/2021     LAPAROSCOPIC CHOLECYSTECTOMY WITH CHOLANGIOGRAMS N/A 5/11/2021    Procedure: CHOLECYSTECTOMY, LAPAROSCOPIC, WITH CHOLANGIOGRAM;  Surgeon: Gretchen Duenas MD;  Location:  OR     LITHOTRIPSY  2007     MAMMOPLASTY REDUCTION  11/07/2014    Dr. Trujillo and Reji. had atypical cells noted on a bx but also wanted reduction so done together.     PARATHYROIDECTOMY Right 8/21/2015    Procedure:  PARATHYROIDECTOMY;  Surgeon: Lynnette Sewell MD;  Location: [facility] OR     TUBAL LIGATION  12/14/1994    post partum       Social History   I have reviewed this patient's social history and updated it with pertinent information if needed.  Social History     Tobacco Use     Smoking status: Never Smoker     Smokeless tobacco: Never Used   Substance Use Topics     Alcohol use: Not Currently     Drug use: No       Family History   I have reviewed this patient's family history and updated it with pertinent information if needed.  Family History   Problem Relation Age of Onset     Hypertension Mother      Cerebrovascular Disease Mother      Diabetes Mother      Hyperlipidemia Mother      Coronary Artery Disease Mother      Colon Cancer Father 90     Other Cancer Maternal Grandmother      Breast Cancer Maternal Grandmother      Cerebrovascular Disease Paternal Grandmother      No Known Problems Sister      No Known Problems Brother      No Known Problems Maternal Grandfather      No Known Problems Other        Prior to Admission Medications   Prior to Admission Medications   Prescriptions Last Dose Informant Patient Reported? Taking?   CALCIUM PO 1/19/2022 at am Self Yes Yes   Sig: Take 1 tablet by mouth daily   IRON PO 1/19/2022 at am Self Yes Yes   Sig: Take 1 tablet by mouth daily    acetaminophen (TYLENOL) 500 MG tablet 1/19/2022 at 0800  Yes Yes   Sig: Take 1,000 mg by mouth every 8 hours as needed for mild pain   lisinopril (ZESTRIL) 20 MG tablet 1/19/2022 at am  Yes Yes   Sig: Take 10 mg by mouth daily 1/2 x 20mg = 10mg   omeprazole (PRILOSEC) 20 MG DR capsule 1/19/2022 at am Self Yes Yes   Sig: Take 20 mg by mouth daily   ondansetron (ZOFRAN-ODT) 8 MG ODT tab prn  Yes Yes   Sig: Take 8 mg by mouth every 8 hours as needed for nausea or vomiting   oxyCODONE (ROXICODONE) 5 MG tablet 1/19/2022 at 0130  No Yes   Sig: Take 1-2 tablets (5-10 mg) by mouth every 6 hours as needed for breakthrough pain or pain    prochlorperazine (COMPAZINE) 10 MG tablet 1/18/2022 at Unknown time  Yes Yes   Sig: Take 10 mg by mouth every 6 hours as needed for nausea Nausea or vomiting unrelieved by ondansetron   vitamin C (ASCORBIC ACID) 1000 MG TABS 1/19/2022 at am  Yes Yes   Sig: Take 1,000 mg by mouth daily   vitamin D3 (CHOLECALCIFEROL) 50 mcg (2000 units) tablet 1/19/2022 at am Self Yes Yes   Sig: Take 1 tablet by mouth daily      Facility-Administered Medications: None     Allergies   No Known Allergies    Physical Exam   Vital Signs: Temp: 97.1  F (36.2  C) Temp src: Temporal BP: 116/54 Pulse: 64   Resp: 18 SpO2: 100 %      Weight: 150 lbs 0 oz    General Appearance: Well nourished female in no acute distress  Eyes: Pupils equal, round and reactive to light, sclera anicteric  HEENT: Mucous membranes moist, no adenopathy  Respiratory: Lungs clear to auscultation bilaterally, no wheeze or crackles, tachypnea  Cardiovascular: Regular rate and rhythm femoral S1/S2, no murmurs crams or gallops  GI: Abdomen mildly firm, mildly distended, slightly hypoactive bowel sounds, epigastric and right lower quadrant tenderness to palpation  Lymph/Hematologic: No peripheral edema  Musculoskeletal: Extremities warm and well-perfused  Neurologic: Alert and appropriate, cranial nerves grossly intact  Psychiatric: Normal affect    Data   Data reviewed today: I reviewed all medications, new labs and imaging results over the last 24 hours. I personally reviewed no images or EKG's today.    Recent Labs   Lab 01/19/22  1247   WBC 7.1   HGB 11.4*   MCV 95         POTASSIUM 3.8   CHLORIDE 102   CO2 25   BUN 10   CR 0.50*   ANIONGAP 6   CRYS 8.7   GLC 77   ALBUMIN 3.0*   PROTTOTAL 6.3*   BILITOTAL 1.1   ALKPHOS 97   ALT 26   AST 18   LIPASE 31*     Recent Results (from the past 24 hour(s))   CT Abdomen Pelvis w Contrast    Narrative    CT ABDOMEN AND PELVIS WITH CONTRAST  1/19/2022 1:40 PM     HISTORY: Abdominal pain, acute, nonlocalized. History  of ampulla of  Vater tumor and pancreatitis, history of cholecystectomy, now with  abdominal pain and nausea in the right side and epigastrium.   Concerned about possible bowel obstruction or other pathology.    TECHNIQUE:  CT abdomen and pelvis with 75 mL Isovue-370 IV. Radiation  dose for this scan was reduced using automated exposure control,  adjustment of the mA and/or kV according to patient size, or iterative  reconstruction technique.    COMPARISON: CT abdomen and pelvis on 7/23/2021.    FINDINGS:  Lower chest: Bibasilar pulmonary opacities, likely atelectasis.    Abdomen/pelvis:  Hepatobiliary: There is a biliary stent extending into the duodenum  with scattered areas of pneumobilia, likely related to the presence of  the stent. The gallbladder is surgically absent. Scattered small  hypodense foci in the liver, for example 1 cm focus in hepatic segment  II (series 4 image 32) and 1.2 cm focus in hepatic segment V (series 4  image 32), worrisome for metastatic foci. Hypodense area in the  anterior aspect of the caudate lobe, indeterminate, could represent  area of focal fatty infiltration.    Pancreas: Mild diffuse prominence of the main pancreatic duct. No  definite solid pancreatic mass.    Spleen: No splenomegaly.    Adrenal glands: 1.2 cm left adrenal nodule (series 4 image 34), not  significantly changed as compared to 7/23/2021. No right adrenal  nodule.    Kidneys: Multiple left kidney stones measure up to 9 mm at the lower  pole (series 4 image 70). Again noted mild left hydronephrosis versus  extrarenal pelvis, not significantly changed as compared to 6/19/2021.  No left ureteral stone visualized. No right kidney/ureteral stones or  hydronephrosis.    Bowel: There is a stent between the stomach and the proximal jejunum.  Multiple mildly dilated small bowel loops with a transition point in  the pelvis (series 4 image 144), worrisome for small bowel  obstruction.    Peritoneum: Small amount of free  fluid in the abdomen. No free  peritoneal or portal venous gas.    Pelvic organs: Unremarkable.    Vascular: Unremarkable.    Lymph nodes: Few mildly prominent retroperitoneal lymph nodes  including 1.1 cm short axis aortocaval lymph node (series 4 image 66),  not significantly changed as compared to 7/23/2021 exam,  indeterminate, could be reactive.    Bones and soft tissue: Multilevel degenerative changes of the spine.      Impression    IMPRESSION:   1. Multiple mildly dilated small bowel loops, with the transition  point in the lower abdomen/pelvis; finding consistent with small bowel  obstruction.  2. Multiple small hypodense foci in the liver, likely metastatic.  3. Hypodense area in the anterior aspect of the caudate lobe,  indeterminate, could represent area of focal fatty infiltration versus  less likely metastatic lesion.  4. Multiple left kidney stones measuring up to 9 mm lower pole. Mild  left hydronephrosis versus extrarenal pelvis, not significantly  changed as compared to 6/19/2021. No left ureteral stone visualized.    APPLE LEES MD         SYSTEM ID:  [id number]

## 2022-01-19 NOTE — PROGRESS NOTES
.RECEIVING UNIT ED HANDOFF REVIEW    ED Nurse Handoff Report was reviewed by: Lawson Haley RN on January 19, 2022 at 5:10 PM       Notes Read.   Orders Reviewed.   Room 611 set up for pt.     Please message floor when pt is ready for transfer.

## 2022-01-19 NOTE — ED NOTES
[facility]  ED Nurse Handoff Report    ED Chief complaint: Abdominal Pain      ED Diagnosis:   Final diagnoses:   Small bowel obstruction (H)   Carcinoma of ampulla of Vater (H)   Liver metastases (H)       Code Status: Full Code    Allergies: No Known Allergies    Patient Story: Pt w/ hx of cancer who comes in c/o abdominal pain. Was seen at PCP and had xrays done that were inconclusive for bowel obstruction. Pt alert and oriented. Resp: WNL Cardiac: WNL Neuro: WNL. GI: abdominal pain and intermittent nausea.       Treatments and/or interventions provided: fent and dilaudid for pain  Patient's response to treatments and/or interventions: Improvement of pain    To be done/followed up on inpatient unit:      Does this patient have any cognitive concerns?: NOne    Activity level - Baseline/Home:  Independent  Activity Level - Current:   Independent    Patient's Preferred language: English   Needed?: No    Isolation: None  Infection: Not Applicable  Patient tested for COVID 19 prior to admission: YES  Bariatric?: No    Vital Signs:   Vitals:    01/19/22 1139   BP: 116/54   Pulse: 64   Resp: 18   Temp: 97.1  F (36.2  C)   TempSrc: Temporal   SpO2: 100%   Weight: 68 kg (150 lb)   Height: 1.524 m (5')       Cardiac Rhythm:     Was the PSS-3 completed:   Yes  What interventions are required if any?               Family Comments:   OBS brochure/video discussed/provided to patient/family: N/A              Name of person given brochure if not patient:               Relationship to patient:     For the majority of the shift this patient's behavior was Green.   Behavioral interventions performed were .    ED NURSE PHONE NUMBER:

## 2022-01-19 NOTE — ED PROVIDER NOTES
History   Chief Complaint:  Abdominal Pain     HPI   Ana Dupree is a 66 year old female with history of malignant neoplasm of ampulla vater who presents with right-sided and epigastric abdominal pain that started two days ago. Notes she vomited in the evening 2 days ago. States her abdomen feels hard and bloated. The pain started in the same area that she initially felt pain due to her tumor. Tumor was incompletely resected due to the fact that it was wrapped around a blood vessel. She did not do radiation because the cancer has spread to her liver. States she had an Xray yesterday that was inconclusive. She was advised not to eat and given 1L IV fluids at that time. No known history of small bowel obstruction.     Patient gets most of her oncology care at [facility].     Review of Systems   Gastrointestinal: Positive for abdominal distention, abdominal pain, nausea and vomiting.   All other systems reviewed and are negative.    Allergies:  The patient has no known allergies.     Medications:  Zestril  Zofran ODT  Roxicodone  Calcium PO  Benadryl  Iron PO  Vyvanse  Prilosec  Vitamin D3  Lovenox  Miralax  Senokot  Compazine    Past Medical History:     Hypertension  Hyperlipidemia  Hyperparathyroidism  Morbid obesity  Osteoporosis  Renal stones  Situational depression   Sleep apnea  Acute gallstone pancreatitis  Jaundice  Biliary obstruction  Anemia  Secondary malignant neoplasm lymph node  Malignant neoplasm of ampulla vater  GERD    CKD  Antineoplastic chemotherapy personal history  Peripheral neuropathy    Past Surgical History:    Biopsy breast  Colonoscopy  Endoscopic retrograde cholangiopancreatogram (x2)  IR biliary drain placement  IR biliary tube change  Laparoscopic cholecystectomy with cholangiograms   Lithotripsy  Mammoplasty reduction  Parathyroidectomy  Tubal ligation      Family History:    Mother: hypertension, cerebrovascular disease, diabetes, hyperlipidemia, CAD  Father: colon  cancer    Social History:  The patient presents to the ED alone.   The patient is COVID vaccinated (x3) per the [facility].     Physical Exam     Patient Vitals for the past 24 hrs:   BP Temp Temp src Pulse Resp SpO2 Height Weight   01/19/22 1139 116/54 97.1  F (36.2  C) Temporal 64 18 100 % 1.524 m (5') 68 kg (150 lb)       Physical Exam  Nursing note and vitals reviewed.    Constitutional:  Appears comfortable.   HENT:    Nose normal.  No discharge.      Oral mucosa is moist.  Eyes:    Conjunctivae are normal without injection.  Pupils are equal.  Lymph:  No enlarged or tender cervical or submandibular lymph nodes.   Cardiovascular:  Normal rate, regular rhythm with normal S1 and S2.      Normal heart sounds and peripheral pulses 2+ and equal.       No murmur or ciera.     Port-a-cath in right chest wall.   Pulmonary:  Effort normal and breath sounds clear to auscultation bilaterally.     No respiratory distress.  No stridor.     No wheezes. No rales.     GI:    Soft. No mass.      Right-sided and epigastric abdominal tenderness. Mild guarding. Mild distention.      No rebound. No flank pain.    Musculoskeletal:  Normal range of motion. No extremity deformity.     No edema and no tenderness.    Neurological:   Alert and oriented. No focal weakness.  Skin:    Skin is warm and dry. No rash noted. No diaphoresis.      No erythema. Appears mildly jaundiced.  No lesions.  Psychiatric:   Behavior is normal. Appropriate mood and affect.     Judgment and thought content normal.     Emergency Department Course     Imaging:  CT Abdomen Pelvis w Contrast   Final Result   IMPRESSION:    1. Multiple mildly dilated small bowel loops, with the transition   point in the lower abdomen/pelvis; finding consistent with small bowel   obstruction.   2. Multiple small hypodense foci in the liver, likely metastatic.   3. Hypodense area in the anterior aspect of the caudate lobe,   indeterminate, could represent area of focal fatty  infiltration versus   less likely metastatic lesion.   4. Multiple left kidney stones measuring up to 9 mm lower pole. Mild   left hydronephrosis versus extrarenal pelvis, not significantly   changed as compared to 6/19/2021. No left ureteral stone visualized.      APPLE LEES MD            SYSTEM ID:  [id number]        Report per radiology    Laboratory:  Labs Ordered and Resulted from Time of ED Arrival to Time of ED Departure   COMPREHENSIVE METABOLIC PANEL - Abnormal       Result Value    Sodium 133      Potassium 3.8      Chloride 102      Carbon Dioxide (CO2) 25      Anion Gap 6      Urea Nitrogen 10      Creatinine 0.50 (*)     Calcium 8.7      Glucose 77      Alkaline Phosphatase 97      AST 18      ALT 26      Protein Total 6.3 (*)     Albumin 3.0 (*)     Bilirubin Total 1.1      GFR Estimate >90     LIPASE - Abnormal    Lipase 31 (*)    CBC WITH PLATELETS AND DIFFERENTIAL - Abnormal    WBC Count 7.1      RBC Count 3.85      Hemoglobin 11.4 (*)     Hematocrit 36.7      MCV 95      MCH 29.6      MCHC 31.1 (*)     RDW 14.0      Platelet Count 212      % Neutrophils 71      % Lymphocytes 18      % Monocytes 9      % Eosinophils 1      % Basophils 1      % Immature Granulocytes 0      NRBCs per 100 WBC 0      Absolute Neutrophils 5.1      Absolute Lymphocytes 1.3      Absolute Monocytes 0.6      Absolute Eosinophils 0.0      Absolute Basophils 0.0      Absolute Immature Granulocytes 0.0      Absolute NRBCs 0.0     ROUTINE UA WITH MICROSCOPIC REFLEX TO CULTURE   COVID-19 VIRUS (CORONAVIRUS) BY PCR        Emergency Department Course:  Reviewed:  I reviewed nursing notes, vitals, past medical history, Care Everywhere and [facility]    Assessments:  1208 MIKO Jameson, obtained history and examined the patient.   1240 I obtained history and examined the patient as noted above.   1535 I rechecked the patient and explained findings.     Consults:  1532 I spoke with Dr. Fan, the Hospitalist, who accepted the  patient.     Interventions:  1251 Zofran 4mg IV  1251 Sublimaze 50mctg IV  1252 0.9% sodium chloride BOLUS 1000mL IV     Disposition:  The patient was admitted to the hospital under the care of Dr. Fan.     Impression & Plan     Medical Decision Making:  Patient is a 66-year-old with metastatic ampulla of Vater cancer.  Comes in with abdominal pain and some distention.  There is a question of some early bowel obstruction yesterday by a plain film at an urgent care.  Patient is mildly distended and has tenderness more in the upper abdomen and right side and lower abdomen.  She is not tense.  She does not have a fever and her vital signs look good.  Labs came back with a comprehensive panel showing a low albumin of 3.0 but otherwise normal.  LFTs and renal function are normal.  Her white count is normal at 7.1, hemoglobin low at 11.4.  Lipase is normal.  Urine came back showing 37 white cells but negative nitrite and no bacteria.  A culture was ordered.  I got a CT of the abdomen and pelvis and she does have a small bowel obstruction.  She has liver mets noted as well.  There are some incidental kidney stones.  IV fluids were given.  This patient needs to come in the hospital for bowel rest and for IV fluids.  Initially she wanted to go home but I explained to her that this would likely get worse without bowel rest and IV fluids.  General surgery can be consulted as needed.  At this point she is not markedly distended and has not been vomiting.  She did receive a dose of fentanyl and Zofran here along with IV fluids so I do not feel that she needs an NG at this time.  If she starts getting worse though 1 could be placed.  I talked with Dr. Fan will be admitting the patient.  Oncology can be consulted as well.      Diagnosis:    ICD-10-CM    1. Small bowel obstruction (H)  K56.609    2. Carcinoma of ampulla of Vater (H)  C24.1    3. Liver metastases (H)  C78.7        Scribe Disclosure:  IMarli, am  serving as a scribe at 12:38 PM on 1/19/2022 to document services personally performed by Farhana Hopkins MD based on my observations and the provider's statements to me.      Farhana Hopkins MD  01/19/22 7201

## 2022-01-19 NOTE — ED TRIAGE NOTES
[facility]  ED Arrival Note    Arrives through triage. ABC's intact. A &O X4. . Pt arrives with c/o sharp upper abdominal pain since Monday. PCP did x-rays yesterday and they were inconclusive. Patient sent to ED to be evaluated for a bowel obstruction. Patient endorses constipation, takes Oxycodone at home, and reports that the pain feels like having contractions at time.       Visitors during triage: None      Triage Interventions: N/A    Ambulatory: Yes    Meets Stroke Criteria?: No    Meets Trauma Criteria?: No    Shock Index: <0.8, for provider reference    Directed to: Main ED    Pronouns: she/her

## 2022-01-20 LAB
ANION GAP SERPL CALCULATED.3IONS-SCNC: 8 MMOL/L (ref 3–14)
BUN SERPL-MCNC: 9 MG/DL (ref 7–30)
CALCIUM SERPL-MCNC: 9.1 MG/DL (ref 8.5–10.1)
CHLORIDE BLD-SCNC: 105 MMOL/L (ref 94–109)
CO2 SERPL-SCNC: 23 MMOL/L (ref 20–32)
CREAT SERPL-MCNC: 0.53 MG/DL (ref 0.52–1.04)
GFR SERPL CREATININE-BSD FRML MDRD: >90 ML/MIN/1.73M2
GLUCOSE BLD-MCNC: 62 MG/DL (ref 70–99)
HGB BLD-MCNC: 11.4 G/DL (ref 11.7–15.7)
POTASSIUM BLD-SCNC: 3.8 MMOL/L (ref 3.4–5.3)
SODIUM SERPL-SCNC: 136 MMOL/L (ref 133–144)

## 2022-01-20 PROCEDURE — 80048 BASIC METABOLIC PNL TOTAL CA: CPT | Performed by: HOSPITALIST

## 2022-01-20 PROCEDURE — 250N000013 HC RX MED GY IP 250 OP 250 PS 637: Performed by: HOSPITALIST

## 2022-01-20 PROCEDURE — 99222 1ST HOSP IP/OBS MODERATE 55: CPT | Performed by: SURGERY

## 2022-01-20 PROCEDURE — 36415 COLL VENOUS BLD VENIPUNCTURE: CPT | Performed by: HOSPITALIST

## 2022-01-20 PROCEDURE — 99232 SBSQ HOSP IP/OBS MODERATE 35: CPT | Performed by: HOSPITALIST

## 2022-01-20 PROCEDURE — 258N000003 HC RX IP 258 OP 636: Performed by: HOSPITALIST

## 2022-01-20 PROCEDURE — 120N000001 HC R&B MED SURG/OB

## 2022-01-20 PROCEDURE — 85018 HEMOGLOBIN: CPT | Performed by: HOSPITALIST

## 2022-01-20 RX ADMIN — PANTOPRAZOLE SODIUM 40 MG: 40 TABLET, DELAYED RELEASE ORAL at 08:06

## 2022-01-20 RX ADMIN — Medication 1 MG: at 01:21

## 2022-01-20 RX ADMIN — SODIUM CHLORIDE 1000 ML: 9 INJECTION, SOLUTION INTRAVENOUS at 15:00

## 2022-01-20 RX ADMIN — Medication 1 MG: at 21:04

## 2022-01-20 ASSESSMENT — ACTIVITIES OF DAILY LIVING (ADL)
ADLS_ACUITY_SCORE: 3

## 2022-01-20 NOTE — PROGRESS NOTES
[facility]    Medicine Progress Note - Hospitalist Service    Date of Admission:  1/19/2022    Assessment & Plan               Ana Dupree is a 66 year old female admitted on 1/19/2022.  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      Acute small bowel obstruction  Presents with several days intermittent cramping abdominal pain with mild nausea and bloating.  Admission CT showing mildly distended bowel with transition point in pelvis.  Hx of abd surgery as well as metastatic adenocarcinoma but no mass noted on CT.   - tolerating clear liquids today and had 2 BM's earlier; will advance to full liquid and to low fiber as tolerated as very eager for discharge  - prn analgesics and antiemetics  - encourage frequent ambulation  - never received IVF overnight for unclear reasons; feels mildly dry - will run  ml/hr x10 hours  - Gen Surg recs appreciated  - she requests we try to push images to [facility] for eval by her Oncology team     Adenocarcinoma of ampulla of Vater with mets to liver  Followed at [facility] as well as [facility].  Completed 6 cycles FOLFOX in Oct 2021, initiated pembrolizumab in Dec 2021.  - hold on oncology consult at this time    HTN  - continue PTA lisinopril     GERD   - continue PTA omeprazole    Hx HLD  Hx KINGS  Resolved with weight loss         Diet: Full Liquid Diet    DVT Prophylaxis: Pneumatic Compression Devices  Chavez Catheter: Not present  Central Lines: PRESENT     Cardiac Monitoring: None  Code Status: Full Code      Disposition Plan   Expected Discharge: 01/22/2022     Anticipated discharge location: home    Delays:            The patient's care was discussed with the Bedside Nurse and Patient.    Jack Fan MD  Hospitalist Service  [facility]  Securely message with the Vocera Web Console (learn more  here)  Text page via [facility] Paging/Directory         Clinically Significant Risk Factors Present on Admission                # Overweight: last Body mass index is 29.29 kg/m .      ______________________________________________________________________    Interval History   Feeling better today with less bloating, tolerating clear liquids today.  Less pain as well.  No nausea.  Feels mildly dry.  Had two BM's this AM, one watery, one with solid stool.  Eager for discharge.     Data reviewed today: I reviewed all medications, new labs and imaging results over the last 24 hours. I personally reviewed no images or EKG's today.    Physical Exam   Vital Signs: Temp: 97.7  F (36.5  C) Temp src: Oral BP: 111/61 Pulse: 59   Resp: 16 SpO2: 99 % O2 Device: None (Room air)    Weight: 150 lbs 0 oz  General Appearance: Well nourished female in NAD  Respiratory: lungs CTAB, no wheezes or crackles, no tachypnea   Cardiovascular: RRR, normal s1/s2 without murmur  GI: abdomen softer today, active bowel sounds, minimal distension, mild lower tenderness  Skin: no peripheral edema   Other: Alert and appropriate, cranial nerves grossly intact     Data   Recent Labs   Lab 01/20/22  0912 01/19/22  1247   WBC  --  7.1   HGB 11.4* 11.4*   MCV  --  95   PLT  --  212    133   POTASSIUM 3.8 3.8   CHLORIDE 105 102   CO2 23 25   BUN 9 10   CR 0.53 0.50*   ANIONGAP 8 6   CRYS 9.1 8.7   GLC 62* 77   ALBUMIN  --  3.0*   PROTTOTAL  --  6.3*   BILITOTAL  --  1.1   ALKPHOS  --  97   ALT  --  26   AST  --  18   LIPASE  --  31*

## 2022-01-20 NOTE — PLAN OF CARE
"Summary: SBO    DATE & TIME: 01/19/2022 7777-6459   Cognitive Concerns/ Orientation : A&Ox4. Calm and cooperative.    BEHAVIOR & AGGRESSION TOOL COLOR: Green   CIWA SCORE: NA    ABNL VS/O2: VSS, on RA   MOBILITY: Independent, moving freq, walked halls.   PAIN MANAGMENT: complain pain but refuses intervention. PRN Melatonin given   DIET: NPO ex Ice chips and meds. Tolerating.   BOWEL/BLADDER: Voiding appropriately. No BM since Monday. Pt reports \"fullness and bloating.\" BS audible, hypoactive in LLQ.   ABNL LAB/BG: Potassium 3.8, Alb 3.0, Prot 6.3, Hgb 11.4  DRAIN/DEVICES: Port accessed in R chest wall.   TELEMETRY RHYTHM: NA   SKIN: WDL, intact. Old surgical scar on abdomin.   TESTS/PROCEDURES: AM BMP, Hgb. UA/UC collected and pending.   D/C DAY/GOALS/PLACE: 2-3 days pending improvements and Surgical sign off.   OTHER IMPORTANT INFO: Surgery consulted. Calls as needed. IV fluids discontinued. In some disbelief about being hospitalized, but understanding of goals for discharge.         "

## 2022-01-20 NOTE — CONSULTS
[facility]  General Surgery Consultation         Adirano Cornell MD, MD    Ana Dupree MRN# [id number]   YOB: 1955 Age: 66 year old      Date of Admission:  1/19/2022  Date of Consult: 1/20/2022         Assessment and Plan:   Pleasant 65 yo female with first time small bowel obstruction.  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  She is currently undergoing immunotherapy.  She presented with abdominal pain was found to have a lower abdominal small bowel obstruction.  This may be due to adhesions or potentially metastatic disease.  Fortunately, patient immediately showed clinical improvement upon presentation and admission to the hospital.  She has no significant nausea, has not required pain medication, and has had 3 bowel movements.  I recommend we start clear liquids and advance as tolerated.  No plans for surgery.  Surgical co-morbities include recent diagnosis of duodenal carcinoma, hypertension, osteoporosis.            Code Status:   Full Code         Primary Care Physician:   Oliva Lovell [phone]         Requesting Physician:      Dr. Fan         Chief Complaint:   Small bowel obstruction    History is obtained from the patient         History of Present Illness:     Ana Dupree is a 66 year old female who reports several days of abdominal pain and mild nausea.  She reports onset of a sharp epigastric pain on Monday night.  She took some MiraLAX without improvement, subsequently took some Tylenol and had an emesis.  She called her doctor who asked her to monitor her symptoms for 24 hours and take oxycodone which she had at home.  Due to lack of improvement in symptoms, she had outpatient abdominal x-ray through [place] [facility] which was inconclusive, but concerning for early obstruction.  She declined admission at that  time, so she was given IV fluids and antiemetics in clinic and went home.  She reports taking oxycodone every 4 hours due to persistent pain.  The clinic contacted her this morning to check-in, and due to persistence of symptoms it was recommended she present to the emergency room for evaluation.  She reports that her pain has been manageable with oxycodone.  She reports the pain reminds her of contractions of pregnancy and have persisted in her epigastrium.  Symptoms seem to be worse with movement, though she is agreeable to ambulating frequently.  She does endorse some mild bloating as well as mild nausea, but reports she has been passing a small amount of gas today.  She denies any lightheadedness otherwise has a negative review of systems.           Past Medical History:   Ana Dupree  has a past medical history of Essential hypertension (11/25/2016), Hyperlipidemia, Hyperparathyroidism (H) (06/01/2014), Hypertension, Morbid obesity (H), Osteoporosis, Osteoporosis, senile (11/25/2016), Primary adenocarcinoma of ampulla of Vater (H), Renal stones, Situational depression, and Sleep apnea (01/01/2009).         Past Surgical History:   Ana Dupree  has a past surgical history that includes Mammoplasty reduction (11/07/2014); tubal ligation (12/14/1994); lithotripsy (2007); Biopsy breast (11/2001, 07/07/2014); Parathyroidectomy (Right, 8/21/2015); Colonoscopy (N/A, 1/19/2017); Laparoscopic cholecystectomy with cholangiograms (N/A, 5/11/2021); Endoscopic retrograde cholangiopancreatogram (N/A, 6/20/2021); IR Biliary Drain Placement (6/22/2021); Endoscopic retrograde cholangiopancreatogram (N/A, 6/22/2021); and IR Biliary Tube Change (6/28/2021).         Home Medications:     Prior to Admission medications    Medication Sig Last Dose Taking? Auth Provider   acetaminophen (TYLENOL) 500 MG tablet Take 1,000 mg by mouth every 8 hours as needed for mild pain 1/19/2022 at 0800 Yes Unknown, Entered By History    CALCIUM PO Take 1 tablet by mouth daily 1/19/2022 at am Yes Unknown, Entered By History   IRON PO Take 1 tablet by mouth daily  1/19/2022 at am Yes Reported, Patient   lisinopril (ZESTRIL) 20 MG tablet Take 10 mg by mouth daily 1/2 x 20mg = 10mg 1/19/2022 at am Yes Unknown, Entered By History   omeprazole (PRILOSEC) 20 MG DR capsule Take 20 mg by mouth daily 1/19/2022 at am Yes Unknown, Entered By History   ondansetron (ZOFRAN-ODT) 8 MG ODT tab Take 8 mg by mouth every 8 hours as needed for nausea or vomiting prn Yes Unknown, Entered By History   oxyCODONE (ROXICODONE) 5 MG tablet Take 1-2 tablets (5-10 mg) by mouth every 6 hours as needed for breakthrough pain or pain 1/19/2022 at 0130 Yes Lawson Verma MD   prochlorperazine (COMPAZINE) 10 MG tablet Take 10 mg by mouth every 6 hours as needed for nausea Nausea or vomiting unrelieved by ondansetron 1/18/2022 at Unknown time Yes Unknown, Entered By History   vitamin C (ASCORBIC ACID) 1000 MG TABS Take 1,000 mg by mouth daily 1/19/2022 at am Yes Unknown, Entered By History   vitamin D3 (CHOLECALCIFEROL) 50 mcg (2000 units) tablet Take 1 tablet by mouth daily 1/19/2022 at am Yes Reported, Patient            Current Medications:           lisinopril  10 mg Oral Daily     pantoprazole  40 mg Oral Daily     sodium chloride (PF)  3 mL Intracatheter Q8H     acetaminophen, bisacodyl, HYDROmorphone, lidocaine 4%, lidocaine (buffered or not buffered), melatonin, naloxone **OR** naloxone **OR** naloxone **OR** naloxone, ondansetron **OR** ondansetron, oxyCODONE, prochlorperazine **OR** prochlorperazine **OR** prochlorperazine, sodium chloride (PF)         Allergies:   No Known Allergies         Social History:   Ana Reidajith  reports that she has never smoked. She has never used smokeless tobacco. She reports previous alcohol use. She reports that she does not use drugs.          Family History:   Ana Dupree family history includes Breast Cancer in  her maternal grandmother; Cerebrovascular Disease in her mother and paternal grandmother; Colon Cancer (age of onset: 90) in her father; Coronary Artery Disease in her mother; Diabetes in her mother; Hyperlipidemia in her mother; Hypertension in her mother; No Known Problems in her brother, maternal grandfather, sister, and another family member; Other Cancer in her maternal grandmother.          Review of Systems:   The 10 point Review of Systems is negative other than noted in the HPI.  Had nausea upon presentation, now significantly improved.           Physical Exam:   Blood pressure 104/53, pulse 59, temperature 97.7  F (36.5  C), temperature source Oral, resp. rate 16, height 1.524 m (5'), weight 68 kg (150 lb), SpO2 99 %, not currently breastfeeding.  150 lbs 0 oz    Healthy, well appearing female in no distress.  Patient has a pleasant affect and communicates well.   Pupils equal round and reactive to light.   No cervical lymphadenopathy or thyromegaly.   Lung fields clear, breathing comfortably.   Heart normal sinus rhythm.  No murmurs rubs or gallops.  Abdomen soft, nontender, nondistended.  Minimal mild tenderness in the upper abdomen, well-healed upper midline laparotomy scar.  No peritoneal signs or rebound.  Skin warm, dry.  No obvious rashes or lesions.           Data:   All new lab and imaging data was reviewed.  This includes review of [facility] notes, personal review of CT scan images which show a biliary stent and a gastrojejunostomy.  Recent Labs   Lab Test 01/19/22  1247 07/23/21  1858 07/23/21  1858 06/28/21  0900 06/22/21  2346 06/22/21  1421   WBC 7.1  --  13.5* 9.6   < > 8.2   HGB 11.4*  --  11.3* 8.9*   < > 9.3*   MCV 95   < > 91 87   < > 88     --  346 336   < > 228   INR  --   --   --  0.95  --  0.93    < > = values in this interval not displayed.      Recent Labs   Lab Test 01/19/22  1247 07/23/21  1858    135   POTASSIUM 3.8 4.1   CHLORIDE 102 105   CO2 25 26   BUN 10 17    CR 0.50* 0.57   ANIONGAP 6 4   CRYS 8.7 9.6   GLC 77 107*

## 2022-01-20 NOTE — PROVIDER NOTIFICATION
Paged Dr. Fan, pt stated surgeon told her she could advance her diet. No notes in requested to advance.

## 2022-01-20 NOTE — PROVIDER NOTIFICATION
Paged Dr. Fan about patients lisinopril no parameters in for it to please enter. Ps BP was 104/53. Orders put in to hold for SBP less than 110. Held per paramters not met. Will recheck BP in 4 hours.

## 2022-01-20 NOTE — PLAN OF CARE
"Cognitive Concerns/ Orientation : A&Ox4. Calm and cooperative.    BEHAVIOR & AGGRESSION TOOL COLOR: Green   CIWA SCORE: NA    ABNL VS/O2: VSS, on RA   MOBILITY: Independent, moving freq, walked halls.   PAIN MANAGMENT: 7/10 this evening. PRN Tylenol and Oxycodone given x1.   DIET: NPO ex Ice chips and meds. Tolerating.   BOWEL/BLADDER: Voiding appropriately. No BM since Monday. Pt reports \"fullness and bloating.\" BS audible, hypoactive in LLQ.   ABNL LAB/BG: Potassium 3.8, Alb 3.0, Prot 6.3, Hgb 11.4  DRAIN/DEVICES: Port accessed in R chest wall.   TELEMETRY RHYTHM: NA   SKIN: WDL, intact. Old surgical scar on abdomin.   TESTS/PROCEDURES: AM BMP, Hgb. UA/UC collected and pending.   D/C DAY/GOALS/PLACE: 2-3 days pending improvements and Surgical sign off.   OTHER IMPORTANT INFO: Surgery consulted. Calls as needed. IV fluids discontinued. In some disbelief about being hospitalized, but understanding of goals for discharge.     "

## 2022-01-20 NOTE — PLAN OF CARE
Summary: SBO    DATE & TIME: 01/20/2022  Cognitive Concerns/ Orientation : A&Ox4. Calm and cooperative.    BEHAVIOR & AGGRESSION TOOL COLOR: Green   CIWA SCORE: NA    ABNL VS/O2: VSS, on RA. Held lisinopril parameters not met.   MOBILITY: Independent, moving freq, walked halls.   PAIN MANAGMENT:Denies pain this shift   DIET: Full liquid diet, if tolerates can advance to low fiber this evening  BOWEL/BLADDER: Voiding appropriately. 2 small Bms this shift  ABNL LAB/BG: Hgb 11.4, K+ 3.8 on protocol scheduled am lab draw  DRAIN/DEVICES: Port accessed in R chest wall. NS running at 100 ml/hr  TELEMETRY RHYTHM: NA   SKIN: WDL, intact. Old surgical scar on abdomin.   TESTS/PROCEDURES: NA  D/C DAY/GOALS/PLACE: Tomorrow morning pending toleration of diet and fluids.   OTHER IMPORTANT INFO: Surgery consulted. Calls as needed. IV fluids started.

## 2022-01-21 VITALS
BODY MASS INDEX: 29.45 KG/M2 | OXYGEN SATURATION: 98 % | RESPIRATION RATE: 16 BRPM | TEMPERATURE: 98.1 F | HEART RATE: 69 BPM | DIASTOLIC BLOOD PRESSURE: 60 MMHG | SYSTOLIC BLOOD PRESSURE: 123 MMHG | WEIGHT: 150 LBS | HEIGHT: 60 IN

## 2022-01-21 LAB — POTASSIUM BLD-SCNC: 3.5 MMOL/L (ref 3.4–5.3)

## 2022-01-21 PROCEDURE — 99238 HOSP IP/OBS DSCHRG MGMT 30/<: CPT | Performed by: HOSPITALIST

## 2022-01-21 PROCEDURE — 84132 ASSAY OF SERUM POTASSIUM: CPT | Performed by: HOSPITALIST

## 2022-01-21 PROCEDURE — 250N000013 HC RX MED GY IP 250 OP 250 PS 637: Performed by: HOSPITALIST

## 2022-01-21 PROCEDURE — 250N000011 HC RX IP 250 OP 636: Performed by: HOSPITALIST

## 2022-01-21 PROCEDURE — 36415 COLL VENOUS BLD VENIPUNCTURE: CPT | Performed by: HOSPITALIST

## 2022-01-21 RX ADMIN — PANTOPRAZOLE SODIUM 40 MG: 40 TABLET, DELAYED RELEASE ORAL at 06:18

## 2022-01-21 RX ADMIN — HYDROMORPHONE HYDROCHLORIDE 0.2 MG: 0.2 INJECTION, SOLUTION INTRAMUSCULAR; INTRAVENOUS; SUBCUTANEOUS at 01:40

## 2022-01-21 ASSESSMENT — ACTIVITIES OF DAILY LIVING (ADL)
ADLS_ACUITY_SCORE: 3

## 2022-01-21 NOTE — PLAN OF CARE
"Date/Time: January 21, 2022 1900-0730  Reason for Admission: SBO    Cognitive/Mentation: AOX4  Neuros/CMS: CMS intact  VS:VSS, on RA. B/P: 127/68, T: 97.2, P: 65, R: 18     GI: pt c/o of ab pain that she states feels like \"pregnancy contractions\" intermittently, prn Dililud covering.  : Continent, no bm during this shift.  Pain: Prn Dilauid provided for abdominal pain.      Skin: WNL  Activity: Independent, ambulated in room and to bathroom.  Diet: Low fiber    Discharge: Planned for today,     End of shift summary: No other events overnight.      "

## 2022-01-21 NOTE — PLAN OF CARE
Discharge    Patient discharged to home via car with self   Care plan note: Reviewed discharge instructions with patient. Did not want any pain medications sent home with her. Pt refused CHG bath while here during her stay. Gave her education handouts on CLABSI and reminded her the importance of the CHG she acknowledged verbal understanding. Paged IV team to de-access port. Pt stated she would wait for them, did not wait for port to be de-accessed before she left.     Listed belongings gathered and given to patient (including from security/pharmacy). Yes  Care Plan and Patient education resolved: Yes  Prescriptions if needed, hard copies sent with patient  NA  Medication Bin checked and emptied on discharge Yes  SW/care coordinator/charge RN aware of discharge: Yes

## 2022-01-22 DIAGNOSIS — Z71.89 OTHER SPECIFIED COUNSELING: ICD-10-CM

## 2022-01-22 NOTE — PROGRESS NOTES
"Pt called back with concerns about not feeling any better and stated she is coming back to ER. Port-a-cath not de-accessed pt was anxious about discharge and did not wait for de-access, he stated on phone, \"I feel like an idiot I did not take this seriously and left and now I am feeling worse and have my port still accessed.\" Writer told patient she should come back if she is not feeling better but needs to come back to get her port de-accessed.   "

## 2022-01-23 ENCOUNTER — PATIENT OUTREACH (OUTPATIENT)
Dept: CARE COORDINATION | Facility: CLINIC | Age: 67
End: 2022-01-23
Payer: COMMERCIAL

## 2022-01-23 NOTE — PROGRESS NOTES
Clinic Care Coordination Contact  [facility]/Voicemail       Clinical Data: Care Coordinator Outreach  Outreach attempted x 1.  Left message on patient's voicemail with call back information and requested return call.  Plan:  Care Coordinator will try to reach patient again in 1-2 business days.    Anne Mir  Community Health Worker  [facility] Care [facility]  Ph:[phone]

## 2022-01-24 NOTE — PROGRESS NOTES
Clinic Care Coordination Contact  [facility]       Clinical Data: Care Coordinator Outreach  Outreach attempted x 2.  Left message on patient's voicemail with call back information and requested return call.  Plan:  Care Coordinator will do no further outreaches at this time.    Anne Mir  Community Health Worker  [facility] Care [facility]  Ph:[phone]

## 2022-02-09 RX ORDER — LISINOPRIL 20 MG/1
TABLET ORAL
Qty: 90 TABLET | OUTPATIENT
Start: 2022-02-09

## 2022-02-09 NOTE — TELEPHONE ENCOUNTER
"Requested Prescriptions   Pending Prescriptions Disp Refills     lisinopril (ZESTRIL) 20 MG tablet [Pharmacy Med Name: LISINOPRIL 20MG TABLETS] 90 tablet      Sig: TAKE 1 TABLET(20 MG) BY MOUTH DAILY       ACE Inhibitors (Including Combos) Protocol Passed - 2/8/2022  5:34 PM        Passed - Blood pressure under 140/90 in past 12 months     BP Readings from Last 3 Encounters:   01/21/22 123/60   07/23/21 104/59   06/28/21 130/64                 Passed - Recent (12 mo) or future (30 days) visit within the authorizing provider's specialty     Patient has had an office visit with the authorizing provider or a provider within the authorizing providers department within the previous 12 mos or has a future within next 30 days. See \"Patient Info\" tab in inbasket, or \"Choose Columns\" in Meds & Orders section of the refill encounter.              Passed - Medication is active on med list        Passed - Patient is age 18 or older        Passed - No active pregnancy on record        Passed - Normal serum creatinine on file in past 12 months     Recent Labs   Lab Test 01/20/22  0912   CR 0.53       Ok to refill medication if creatinine is low          Passed - Normal serum potassium on file in past 12 months     Recent Labs   Lab Test 01/21/22  0817   POTASSIUM 3.5             Passed - No positive pregnancy test within past 12 months           Last Written Prescription Date:  1/19/22  Last Fill Quantity: 10mg,  # refills: 0   Last office visit: 3/17/2021 with prescribing provider:  Jack Fan MD   Future Office Visit:      Prescribing provider does not work at this clinic. Rx denied.   Rashad Alexander RN on 2/9/2022 at 11:46 AM            "

## 2022-08-16 ENCOUNTER — ANCILLARY PROCEDURE (OUTPATIENT)
Dept: GENERAL RADIOLOGY | Facility: CLINIC | Age: 67
End: 2022-08-16
Attending: NURSE PRACTITIONER
Payer: COMMERCIAL

## 2022-08-16 DIAGNOSIS — R06.02 SHORTNESS OF BREATH: ICD-10-CM

## 2022-08-16 PROCEDURE — 71046 X-RAY EXAM CHEST 2 VIEWS: CPT

## 2022-08-22 ENCOUNTER — ANCILLARY PROCEDURE (OUTPATIENT)
Dept: CT IMAGING | Facility: CLINIC | Age: 67
End: 2022-08-22
Attending: NURSE PRACTITIONER
Payer: COMMERCIAL

## 2022-08-22 DIAGNOSIS — C24.1: ICD-10-CM

## 2022-08-22 PROCEDURE — 250N000011 HC RX IP 250 OP 636: Performed by: NURSE PRACTITIONER

## 2022-08-22 PROCEDURE — 71260 CT THORAX DX C+: CPT

## 2022-08-22 PROCEDURE — 255N000002 HC RX 255 OP 636: Performed by: NURSE PRACTITIONER

## 2022-08-22 RX ADMIN — IOHEXOL 80 ML: 350 INJECTION, SOLUTION INTRAVENOUS at 15:10

## 2022-08-22 RX ADMIN — Medication 5 ML: at 15:11

## 2022-09-26 ENCOUNTER — HOSPITAL ENCOUNTER (OUTPATIENT)
Dept: CT IMAGING | Facility: CLINIC | Age: 67
Discharge: HOME | End: 2022-09-26
Attending: NURSE PRACTITIONER | Admitting: NURSE PRACTITIONER
Payer: COMMERCIAL

## 2022-09-26 DIAGNOSIS — C24.1 MALIGNANT NEOPLASM OF AMPULLA OF VATER (H): ICD-10-CM

## 2022-09-26 DIAGNOSIS — R06.02 SHORTNESS OF BREATH: ICD-10-CM

## 2022-09-26 DIAGNOSIS — J98.4 DRUG-INDUCED PNEUMONITIS: ICD-10-CM

## 2022-09-26 DIAGNOSIS — J18.9 PNA (PNEUMONIA): ICD-10-CM

## 2022-09-26 DIAGNOSIS — T50.905A DRUG-INDUCED PNEUMONITIS: ICD-10-CM

## 2022-09-26 PROCEDURE — 250N000009 HC RX 250: Performed by: NURSE PRACTITIONER

## 2022-09-26 PROCEDURE — 250N000011 HC RX IP 250 OP 636: Performed by: NURSE PRACTITIONER

## 2022-09-26 PROCEDURE — 71275 CT ANGIOGRAPHY CHEST: CPT

## 2022-09-26 RX ORDER — IOPAMIDOL 755 MG/ML
74 INJECTION, SOLUTION INTRAVASCULAR ONCE
Status: COMPLETED | OUTPATIENT
Start: 2022-09-26 | End: 2022-09-26

## 2022-09-26 RX ADMIN — SODIUM CHLORIDE 86 ML: 9 INJECTION, SOLUTION INTRAVENOUS at 16:09

## 2022-09-26 RX ADMIN — IOPAMIDOL 59 ML: 755 INJECTION, SOLUTION INTRAVENOUS at 16:10

## 2022-10-15 ENCOUNTER — HEALTH MAINTENANCE LETTER (OUTPATIENT)
Age: 67
End: 2022-10-15

## 2022-12-05 ENCOUNTER — HOSPITAL ENCOUNTER (INPATIENT)
Facility: CLINIC | Age: 67
Setting detail: SURGERY ADMIT
End: 2022-12-05
Attending: SURGERY | Admitting: SURGERY
Payer: COMMERCIAL

## 2023-03-26 ENCOUNTER — HEALTH MAINTENANCE LETTER (OUTPATIENT)
Age: 68
End: 2023-03-26

## 2023-10-24 ENCOUNTER — MYC MEDICAL ADVICE (OUTPATIENT)
Dept: INTERVENTIONAL RADIOLOGY/VASCULAR | Facility: CLINIC | Age: 68
End: 2023-10-24
Payer: COMMERCIAL

## 2023-10-29 ENCOUNTER — HEALTH MAINTENANCE LETTER (OUTPATIENT)
Age: 68
End: 2023-10-29

## 2023-11-01 NOTE — TELEPHONE ENCOUNTER
Writer has spoken with Ana regarding planned procedure with IR via telephone.      Ana acknowledges understanding of pre-procedure instructions.         I have provided Ana with IR number ([phone]) for questions or concerns.    A documented H&P exam is noted in patient EMR with the date 10/12/2023.    Cyndy MELÉNDEZ  Interventional Radiology RN   [phone]

## 2023-11-07 RX ORDER — NALOXONE HYDROCHLORIDE 0.4 MG/ML
0.4 INJECTION, SOLUTION INTRAMUSCULAR; INTRAVENOUS; SUBCUTANEOUS
Status: CANCELLED | OUTPATIENT
Start: 2023-11-07

## 2023-11-07 RX ORDER — NALOXONE HYDROCHLORIDE 0.4 MG/ML
0.2 INJECTION, SOLUTION INTRAMUSCULAR; INTRAVENOUS; SUBCUTANEOUS
Status: CANCELLED | OUTPATIENT
Start: 2023-11-07

## 2023-11-07 RX ORDER — FLUMAZENIL 0.1 MG/ML
0.2 INJECTION, SOLUTION INTRAVENOUS
Status: CANCELLED | OUTPATIENT
Start: 2023-11-07

## 2023-11-07 RX ORDER — SODIUM CHLORIDE 9 MG/ML
INJECTION, SOLUTION INTRAVENOUS CONTINUOUS
Status: CANCELLED | OUTPATIENT
Start: 2023-11-07

## 2023-11-07 RX ORDER — FENTANYL CITRATE 50 UG/ML
25-50 INJECTION, SOLUTION INTRAMUSCULAR; INTRAVENOUS EVERY 5 MIN PRN
Status: CANCELLED | OUTPATIENT
Start: 2023-11-07

## 2023-11-08 ENCOUNTER — APPOINTMENT (OUTPATIENT)
Dept: INTERVENTIONAL RADIOLOGY/VASCULAR | Facility: CLINIC | Age: 68
End: 2023-11-08
Attending: INTERNAL MEDICINE
Payer: COMMERCIAL

## 2023-11-08 ENCOUNTER — HOSPITAL ENCOUNTER (OUTPATIENT)
Facility: CLINIC | Age: 68
Discharge: HOME | End: 2023-11-08
Admitting: RADIOLOGY
Payer: COMMERCIAL

## 2023-11-08 VITALS
RESPIRATION RATE: 18 BRPM | TEMPERATURE: 97.9 F | HEART RATE: 60 BPM | SYSTOLIC BLOOD PRESSURE: 107 MMHG | DIASTOLIC BLOOD PRESSURE: 69 MMHG | OXYGEN SATURATION: 99 %

## 2023-11-08 DIAGNOSIS — C24.1 MALIGNANT NEOPLASM OF AMPULLA OF VATER (H): ICD-10-CM

## 2023-11-08 PROCEDURE — 999N000163 HC STATISTIC SIMPLE TUBE INSERTION/CHARGE, PORT, CATH, FISTULOGRAM

## 2023-11-08 PROCEDURE — 250N000009 HC RX 250: Performed by: RADIOLOGY

## 2023-11-08 PROCEDURE — 36590 REMOVAL TUNNELED CV CATH: CPT

## 2023-11-08 RX ADMIN — LIDOCAINE HYDROCHLORIDE 14 ML: 10; .005 INJECTION, SOLUTION EPIDURAL; INFILTRATION; INTRACAUDAL; PERINEURAL at 13:50

## 2023-11-08 ASSESSMENT — ACTIVITIES OF DAILY LIVING (ADL): ADLS_ACUITY_SCORE: 35

## 2023-11-08 NOTE — PRE-PROCEDURE
GENERAL PRE-PROCEDURE:   Procedure:  Right port a catheter removal with local  Date/Time:  11/8/2023 1:00 PM    Written consent obtained?: Yes    Risks and benefits: Risks, benefits and alternatives were discussed    Consent given by:  Patient  Patient states understanding of procedure being performed: Yes    Patient's understanding of procedure matches consent: Yes    Procedure consent matches procedure scheduled: Yes    Appropriately NPO:  Yes    Total time: 15 minutes    Thanks [facility] Interventional Radiology CNP ([phone]) (phone [phone])

## 2023-11-08 NOTE — PROGRESS NOTES
Interventional Radiology Intra-procedural Nursing Note    Patient Name: Ana Dupree  Medical Record Number: [id number]  Today's Date: November 8, 2023    Start Time: 1341  End of procedure time: 1358  Procedure: Removal of right port , catheter inspected and intact.    Report given to: caresuites RN  Time pt departs:  1405       Other Notes:   Consent obtained, local anesthesia only  Prepped with chloraprep and draped sterilly.  Dermabond and gauze/ tegaderm    1% lidocaine with epi: 14 ml    Debrief:1400      Evelyn Churchill RN

## 2023-11-08 NOTE — DISCHARGE INSTRUCTIONS
Port Removal Discharge Instructions     After you go home:      You may resume your normal diet      Care of Puncture Site:    Keep the dressings on your sites clean & dry for 24-48 hours. Change it only if it gets wet or dirty.  You may take a shower 24-48 hours after your procedure. Do not scrub site.  No submerging site for 2 weeks or until the incision is completely healed.  Do not remove the small white strips of tape, if present. Allow them to fall off on their own.   You may cover the wound with a bandaid if needed for comfort.      Activity     Avoid heavy lifting (greater than 10 pounds) or the overuse of your shoulder for 48-72 hours.    Bleeding:    If you start bleeding from the incision site in your chest or have swelling in your neck, sit down and press on the site for 5-10 minutes.   If bleeding has not stopped after 10 minutes, call your provider.        Call 911 right away if you have heavy bleeding or bleeding that does not stop.      Medicines:    You may resume all medications  For minor pain, you may take Acetaminophen (Tylenol) or Ibuprofen (Advil)          Call the provider who ordered this procedure if:    You have swelling in your neck or over your port site  The incision area is red, swollen, hot or tender  You have chills or a fever greater than 101 F (38 C)  Any questions or concerns    Call  911 or go to the Emergency Room if you have:    Severe chest pain or trouble breathing  Bleeding that you cannot control    If you have questions call:          [facility] Radiology Dept @ [phone]    The provider who performed your procedure was _________________.

## 2023-11-08 NOTE — PROGRESS NOTES
Care Suites Post Procedure Note    Patient Information  Name: Ana Dupree  Age: 68 year old    Post Procedure  Time patient returned to Care Suites: 1405  Concerns/abnormal assessment: No  If abnormal assessment, provider notified: N/A  Plan/Other: Proceed per orders.   Pt VSS, no sedation. Site CDI    Jennifer Shaffer RN

## 2023-11-08 NOTE — PROGRESS NOTES
Care Suites Admission Nursing Note    Patient Information  Name: Ana Dupree  Age: 68 year old  Reason for admission: Port removal  Care Suites arrival time: 1230       Patient Admission/Assessment   Pre-procedure assessment complete: Yes  If abnormal assessment/labs, provider notified: N/A  NPO: N/A  Medications held per instructions/orders: N/A  Consent: deferred  If applicable, pregnancy test status: deferred  Patient oriented to room: Yes  Education/questions answered: Yes  Plan/other: Proceed per orders      Discharge location: [place]    Jennifer Shaffer RN

## 2023-11-08 NOTE — PROGRESS NOTES
Care Suites Discharge Nursing Note    Patient Information  Name: Ana Dupree  Age: 68 year old    Discharge Education:  Discharge instructions reviewed: Yes  Additional education/resources provided: Yes  Patient/patient representative verbalizes understanding: Yes  Patient discharging on new medications: No  Medication education completed: N/A    Discharge Plans:   Discharge location: home  Discharge ride contacted: N/A  Approximate discharge time: 1415    Discharge Criteria:  Discharge criteria met and vital signs stable: Yes    Patient Belongs:  Patient belongings returned to patient: Yes    Jennifer Shaffer RN

## 2023-12-04 ENCOUNTER — ANESTHESIA EVENT (OUTPATIENT)
Dept: SURGERY | Facility: CLINIC | Age: 68
End: 2023-12-04
Payer: COMMERCIAL

## 2023-12-04 ASSESSMENT — LIFESTYLE VARIABLES: TOBACCO_USE: 0

## 2023-12-04 ASSESSMENT — COPD QUESTIONNAIRES: COPD: 0

## 2023-12-04 NOTE — ANESTHESIA PREPROCEDURE EVALUATION
Anesthesia Pre-Procedure Evaluation    Patient: Ana Dupree   MRN: [id number] : 1955        Procedure : Procedure(s):  CYSTOSCOPY, RIGHT URETEROSCOPY, RIGHT RETROGRADE PYELOGRAM, HOLMIUM LASER LITHOTRIPSY, AND RIGHT STENT INSERTION          Past Medical History:   Diagnosis Date    Essential hypertension 2016    Hyperlipidemia     elevated triglycerides    Hyperparathyroidism (H24) 2014    referred to endo when PTH/calcium still high  and . seeing Dr. leos. dexa wtih penia at hips and porosis wrist. disc. surgery     Hypertension     Malignant neoplasm metastatic to lymph nodes (H)     Moderate episode of recurrent major depressive disorder (H)     Morbid obesity (H)     Osteoporosis     osteopenia at hips and osteoporosis at wrist according to dexa done through endo referral    Osteoporosis, senile 2016    Primary adenocarcinoma of ampulla of Vater (H)     Renal stones     Situational depression     Sleep apnea 2009    Doesn't use a CPAP      Past Surgical History:   Procedure Laterality Date    BIOPSY BREAST  2001, 2014    excisional left side-B9 fibrocystic change. stereotactic bx left at 2 oclock for calcs. path-atypical ductal proliferation    COLONOSCOPY N/A 2017    Procedure: COMBINED COLONOSCOPY, SINGLE OR MULTIPLE BIOPSY/POLYPECTOMY BY BIOPSY;  Surgeon: Jerald Grant MD;  Location:  GI    ENDOSCOPIC RETROGRADE CHOLANGIOPANCREATOGRAM N/A 2021    Procedure: ENDOSCOPIC RETROGRADE CHOLANGIOPANCREATOGRAPHY, WITH SPHINCTEROTOMY AND BIOPSY and Endoscopic Ultrasound;  Surgeon: Jerald Grant MD;  Location:  OR    ENDOSCOPIC RETROGRADE CHOLANGIOPANCREATOGRAM N/A 2021    Procedure: ENDOSCOPIC RETROGRADE CHOLANGIOPANCREATOGRAPHY and Sphincterotomy;  Surgeon: Jerald Grant MD;  Location: [facility] OR    IR BILIARY DRAIN PLACEMENT  2021    IR BILIARY TUBE CHANGE  2021    IR PORT REMOVAL RIGHT  2023     LAPAROSCOPIC CHOLECYSTECTOMY WITH CHOLANGIOGRAMS N/A 5/11/2021    Procedure: CHOLECYSTECTOMY, LAPAROSCOPIC, WITH CHOLANGIOGRAM;  Surgeon: Gretchen Duenas MD;  Location: [facility] OR    LITHOTRIPSY  2007    MAMMOPLASTY REDUCTION  11/07/2014    Dr. Trujillo and Reji. had atypical cells noted on a bx but also wanted reduction so done together.    PARATHYROIDECTOMY Right 8/21/2015    Procedure: PARATHYROIDECTOMY;  Surgeon: Lynnette Sewell MD;  Location: [facility] OR    TUBAL LIGATION  12/14/1994    post partum      No Known Allergies   Social History     Tobacco Use    Smoking status: Never    Smokeless tobacco: Never   Substance Use Topics    Alcohol use: Not Currently      Wt Readings from Last 1 Encounters:   01/19/22 68 kg (150 lb)        Anesthesia Evaluation            ROS/MED HX  ENT/Pulmonary:     (+) sleep apnea,                                   (-) tobacco use, asthma and COPD   Neurologic:  - neg neurologic ROS     Cardiovascular:     (+) Dyslipidemia hypertension- -   -  - -                                 Previous cardiac testing   Echo: Date: 2019 Results:  1. The left ventricle is normal in structure, function and size. The visual  ejection fraction is estimated at 60%.  2. The right ventricle is normal in structure, function and size.  3. There is mild (1+) aortic regurgitation.  4. The ascending aorta is Mildly dilated. 3.7cm. Root normal at 3.5cm.    Stress Test:  Date: Results:    ECG Reviewed:  Date: Results:    Cath:  Date: Results:      METS/Exercise Tolerance:     Hematologic:       Musculoskeletal:       GI/Hepatic: Comment: Esophageal stent.     (+) GERD, Asymptomatic on medication,           liver disease,       Renal/Genitourinary:     (+) renal disease,      Nephrolithiasis ,       Endo:     (+)               Obesity,       Psychiatric/Substance Use:     (+) psychiatric history depression       Infectious Disease:       Malignancy:   (+) Malignancy, History of GI.    Other:            Physical  Exam    Airway        Mallampati: II   TM distance: > 3 FB   Neck ROM: full   Mouth opening: > 3 cm    Respiratory Devices and Support         Dental       (+) Multiple crowns, permanant bridges    B=Bridge, C=Chipped, L=Loose, M=Missing    Cardiovascular   cardiovascular exam normal          Pulmonary   pulmonary exam normal                OUTSIDE LABS:  CBC:   Lab Results   Component Value Date    WBC 7.1 01/19/2022    WBC 13.5 (H) 07/23/2021    HGB 11.4 (L) 01/20/2022    HGB 11.4 (L) 01/19/2022    HCT 36.7 01/19/2022    HCT 35.0 07/23/2021     01/19/2022     07/23/2021     BMP:   Lab Results   Component Value Date     01/20/2022     01/19/2022    POTASSIUM 3.5 01/21/2022    POTASSIUM 3.8 01/20/2022    CHLORIDE 105 01/20/2022    CHLORIDE 102 01/19/2022    CO2 23 01/20/2022    CO2 25 01/19/2022    BUN 9 01/20/2022    BUN 10 01/19/2022    CR 0.53 01/20/2022    CR 0.50 (L) 01/19/2022    GLC 62 (L) 01/20/2022    GLC 77 01/19/2022     COAGS:   Lab Results   Component Value Date    PTT 34 06/19/2021    INR 0.95 06/28/2021     POC:   Lab Results   Component Value Date     (H) 08/21/2015     HEPATIC:   Lab Results   Component Value Date    ALBUMIN 3.0 (L) 01/19/2022    PROTTOTAL 6.3 (L) 01/19/2022    ALT 26 01/19/2022    AST 18 01/19/2022     (H) 06/24/2021    ALKPHOS 97 01/19/2022    BILITOTAL 1.1 01/19/2022    JASWANT <10 (L) 06/19/2021     OTHER:   Lab Results   Component Value Date    LACT 0.9 07/23/2021    A1C 5.4 11/11/2020    CRYS 9.1 01/20/2022    PHOS 2.7 11/23/2016    MAG 2.1 11/23/2016    LIPASE 31 (L) 01/19/2022    TSH 0.60 11/23/2016    T4 1.21 11/23/2016    CRP 35.1 (H) 06/23/2021       Anesthesia Plan    ASA Status:  3    NPO Status:  NPO Appropriate    Anesthesia Type: General.     - Airway: ETT   Induction: Intravenous, Propofol, RSI.   Maintenance: TIVA.   Techniques and Equipment:     - Airway: Video-Laryngoscope       Consents    Anesthesia Plan(s) and associated  risks, benefits, and realistic alternatives discussed. Questions answered and patient/representative(s) expressed understanding.     - Discussed:     - Discussed with:  Patient      - Extended Intubation/Ventilatory Support Discussed: No.      - Patient is DNR/DNI Status: No     Use of blood products discussed: No .     Postoperative Care    Pain management: Multi-modal analgesia, IV analgesics.   PONV prophylaxis: Ondansetron (or other 5HT-3), Dexamethasone or Solumedrol     Comments:    Other Comments: Patient is counseled on the anesthesia plan and relevant anesthesia procedures including all risks and benefits. All patient questions were answered.     ETT as patient has an esophageal stent.              Jabari Jaimes MD    I have reviewed the pertinent notes and labs in the chart from the past 30 days and (re)examined the patient.  Any updates or changes from those notes are reflected in this note.

## 2023-12-05 ENCOUNTER — APPOINTMENT (OUTPATIENT)
Dept: GENERAL RADIOLOGY | Facility: CLINIC | Age: 68
End: 2023-12-05
Attending: UROLOGY
Payer: COMMERCIAL

## 2023-12-05 ENCOUNTER — HOSPITAL ENCOUNTER (OUTPATIENT)
Facility: CLINIC | Age: 68
Discharge: HOME | End: 2023-12-05
Attending: UROLOGY | Admitting: UROLOGY
Payer: COMMERCIAL

## 2023-12-05 ENCOUNTER — ANESTHESIA (OUTPATIENT)
Dept: SURGERY | Facility: CLINIC | Age: 68
End: 2023-12-05
Payer: COMMERCIAL

## 2023-12-05 VITALS
WEIGHT: 143.4 LBS | HEART RATE: 70 BPM | TEMPERATURE: 97.2 F | BODY MASS INDEX: 28.16 KG/M2 | DIASTOLIC BLOOD PRESSURE: 74 MMHG | RESPIRATION RATE: 19 BRPM | OXYGEN SATURATION: 98 % | HEIGHT: 60 IN | SYSTOLIC BLOOD PRESSURE: 112 MMHG

## 2023-12-05 DIAGNOSIS — N20.0 RENAL STONE: Primary | ICD-10-CM

## 2023-12-05 PROCEDURE — 258N000003 HC RX IP 258 OP 636: Performed by: ANESTHESIOLOGY

## 2023-12-05 PROCEDURE — 250N000009 HC RX 250: Performed by: UROLOGY

## 2023-12-05 PROCEDURE — C1758 CATHETER, URETERAL: HCPCS | Performed by: UROLOGY

## 2023-12-05 PROCEDURE — 250N000013 HC RX MED GY IP 250 OP 250 PS 637: Performed by: UROLOGY

## 2023-12-05 PROCEDURE — 250N000025 HC SEVOFLURANE, PER MIN: Performed by: UROLOGY

## 2023-12-05 PROCEDURE — 999N000179 XR SURGERY CARM FLUORO LESS THAN 5 MIN W STILLS: Mod: TC

## 2023-12-05 PROCEDURE — 999N000141 HC STATISTIC PRE-PROCEDURE NURSING ASSESSMENT: Performed by: UROLOGY

## 2023-12-05 PROCEDURE — 250N000011 HC RX IP 250 OP 636: Mod: JZ | Performed by: ANESTHESIOLOGY

## 2023-12-05 PROCEDURE — C1769 GUIDE WIRE: HCPCS | Performed by: UROLOGY

## 2023-12-05 PROCEDURE — C2617 STENT, NON-COR, TEM W/O DEL: HCPCS | Performed by: UROLOGY

## 2023-12-05 PROCEDURE — 272N000001 HC OR GENERAL SUPPLY STERILE: Performed by: UROLOGY

## 2023-12-05 PROCEDURE — 250N000009 HC RX 250: Performed by: ANESTHESIOLOGY

## 2023-12-05 PROCEDURE — 710N000009 HC RECOVERY PHASE 1, LEVEL 1, PER MIN: Performed by: UROLOGY

## 2023-12-05 PROCEDURE — 370N000017 HC ANESTHESIA TECHNICAL FEE, PER MIN: Performed by: UROLOGY

## 2023-12-05 PROCEDURE — 710N000012 HC RECOVERY PHASE 2, PER MINUTE: Performed by: UROLOGY

## 2023-12-05 PROCEDURE — 250N000011 HC RX IP 250 OP 636

## 2023-12-05 PROCEDURE — 258N000001 HC RX 258: Performed by: UROLOGY

## 2023-12-05 PROCEDURE — 360N000076 HC SURGERY LEVEL 3, PER MIN: Performed by: UROLOGY

## 2023-12-05 DEVICE — URETERAL STENT
Type: IMPLANTABLE DEVICE | Site: URETER | Status: FUNCTIONAL
Brand: POLARIS™ ULTRA

## 2023-12-05 RX ORDER — FENTANYL CITRATE 50 UG/ML
50 INJECTION, SOLUTION INTRAMUSCULAR; INTRAVENOUS EVERY 5 MIN PRN
Status: DISCONTINUED | OUTPATIENT
Start: 2023-12-05 | End: 2023-12-05 | Stop reason: HOSPADM

## 2023-12-05 RX ORDER — HYDROMORPHONE HCL IN WATER/PF 6 MG/30 ML
0.2 PATIENT CONTROLLED ANALGESIA SYRINGE INTRAVENOUS EVERY 5 MIN PRN
Status: DISCONTINUED | OUTPATIENT
Start: 2023-12-05 | End: 2023-12-05 | Stop reason: HOSPADM

## 2023-12-05 RX ORDER — DEXAMETHASONE SODIUM PHOSPHATE 4 MG/ML
INJECTION, SOLUTION INTRA-ARTICULAR; INTRALESIONAL; INTRAMUSCULAR; INTRAVENOUS; SOFT TISSUE PRN
Status: DISCONTINUED | OUTPATIENT
Start: 2023-12-05 | End: 2023-12-05

## 2023-12-05 RX ORDER — FENTANYL CITRATE 50 UG/ML
25 INJECTION, SOLUTION INTRAMUSCULAR; INTRAVENOUS EVERY 5 MIN PRN
Status: DISCONTINUED | OUTPATIENT
Start: 2023-12-05 | End: 2023-12-05 | Stop reason: HOSPADM

## 2023-12-05 RX ORDER — FENTANYL CITRATE 50 UG/ML
INJECTION, SOLUTION INTRAMUSCULAR; INTRAVENOUS PRN
Status: DISCONTINUED | OUTPATIENT
Start: 2023-12-05 | End: 2023-12-05

## 2023-12-05 RX ORDER — CEFAZOLIN SODIUM/WATER 2 G/20 ML
2 SYRINGE (ML) INTRAVENOUS SEE ADMIN INSTRUCTIONS
Status: DISCONTINUED | OUTPATIENT
Start: 2023-12-05 | End: 2023-12-05 | Stop reason: HOSPADM

## 2023-12-05 RX ORDER — PROPOFOL 10 MG/ML
INJECTION, EMULSION INTRAVENOUS PRN
Status: DISCONTINUED | OUTPATIENT
Start: 2023-12-05 | End: 2023-12-05

## 2023-12-05 RX ORDER — ONDANSETRON 2 MG/ML
INJECTION INTRAMUSCULAR; INTRAVENOUS PRN
Status: DISCONTINUED | OUTPATIENT
Start: 2023-12-05 | End: 2023-12-05

## 2023-12-05 RX ORDER — HYDROMORPHONE HCL IN WATER/PF 6 MG/30 ML
0.4 PATIENT CONTROLLED ANALGESIA SYRINGE INTRAVENOUS EVERY 5 MIN PRN
Status: DISCONTINUED | OUTPATIENT
Start: 2023-12-05 | End: 2023-12-05 | Stop reason: HOSPADM

## 2023-12-05 RX ORDER — HYDROCODONE BITARTRATE AND ACETAMINOPHEN 5; 325 MG/1; MG/1
1 TABLET ORAL ONCE
Status: DISCONTINUED | OUTPATIENT
Start: 2023-12-05 | End: 2023-12-05 | Stop reason: HOSPADM

## 2023-12-05 RX ORDER — EPHEDRINE SULFATE 50 MG/ML
INJECTION, SOLUTION INTRAMUSCULAR; INTRAVENOUS; SUBCUTANEOUS PRN
Status: DISCONTINUED | OUTPATIENT
Start: 2023-12-05 | End: 2023-12-05

## 2023-12-05 RX ORDER — LIDOCAINE HYDROCHLORIDE 20 MG/ML
INJECTION, SOLUTION INFILTRATION; PERINEURAL PRN
Status: DISCONTINUED | OUTPATIENT
Start: 2023-12-05 | End: 2023-12-05

## 2023-12-05 RX ORDER — ONDANSETRON 4 MG/1
4 TABLET, ORALLY DISINTEGRATING ORAL EVERY 30 MIN PRN
Status: DISCONTINUED | OUTPATIENT
Start: 2023-12-05 | End: 2023-12-05 | Stop reason: HOSPADM

## 2023-12-05 RX ORDER — ONDANSETRON 2 MG/ML
4 INJECTION INTRAMUSCULAR; INTRAVENOUS EVERY 30 MIN PRN
Status: DISCONTINUED | OUTPATIENT
Start: 2023-12-05 | End: 2023-12-05 | Stop reason: HOSPADM

## 2023-12-05 RX ORDER — FUROSEMIDE 10 MG/ML
INJECTION INTRAMUSCULAR; INTRAVENOUS PRN
Status: DISCONTINUED | OUTPATIENT
Start: 2023-12-05 | End: 2023-12-05

## 2023-12-05 RX ORDER — TRAMADOL HYDROCHLORIDE 50 MG/1
50 TABLET ORAL 3 TIMES DAILY
Qty: 12 TABLET | Refills: 0 | Status: SHIPPED | OUTPATIENT
Start: 2023-12-05 | End: 2023-12-09

## 2023-12-05 RX ORDER — CEFAZOLIN SODIUM/WATER 2 G/20 ML
2 SYRINGE (ML) INTRAVENOUS
Status: COMPLETED | OUTPATIENT
Start: 2023-12-05 | End: 2023-12-05

## 2023-12-05 RX ORDER — TOLTERODINE TARTRATE 2 MG/1
2 TABLET, EXTENDED RELEASE ORAL ONCE
Status: COMPLETED | OUTPATIENT
Start: 2023-12-05 | End: 2023-12-05

## 2023-12-05 RX ORDER — MAGNESIUM HYDROXIDE 1200 MG/15ML
LIQUID ORAL PRN
Status: DISCONTINUED | OUTPATIENT
Start: 2023-12-05 | End: 2023-12-05 | Stop reason: HOSPADM

## 2023-12-05 RX ORDER — SODIUM CHLORIDE, SODIUM LACTATE, POTASSIUM CHLORIDE, CALCIUM CHLORIDE 600; 310; 30; 20 MG/100ML; MG/100ML; MG/100ML; MG/100ML
INJECTION, SOLUTION INTRAVENOUS CONTINUOUS
Status: DISCONTINUED | OUTPATIENT
Start: 2023-12-05 | End: 2023-12-05 | Stop reason: HOSPADM

## 2023-12-05 RX ORDER — SODIUM CHLORIDE, SODIUM LACTATE, POTASSIUM CHLORIDE, CALCIUM CHLORIDE 600; 310; 30; 20 MG/100ML; MG/100ML; MG/100ML; MG/100ML
INJECTION, SOLUTION INTRAVENOUS CONTINUOUS PRN
Status: DISCONTINUED | OUTPATIENT
Start: 2023-12-05 | End: 2023-12-05

## 2023-12-05 RX ADMIN — TOLTERODINE TARTRATE 2 MG: 2 TABLET, FILM COATED ORAL at 10:24

## 2023-12-05 RX ADMIN — MIDAZOLAM 2 MG: 1 INJECTION INTRAMUSCULAR; INTRAVENOUS at 08:22

## 2023-12-05 RX ADMIN — FENTANYL CITRATE 50 MCG: 50 INJECTION INTRAMUSCULAR; INTRAVENOUS at 08:35

## 2023-12-05 RX ADMIN — SODIUM CHLORIDE, POTASSIUM CHLORIDE, SODIUM LACTATE AND CALCIUM CHLORIDE: 600; 310; 30; 20 INJECTION, SOLUTION INTRAVENOUS at 08:17

## 2023-12-05 RX ADMIN — Medication 5 MG: at 08:54

## 2023-12-05 RX ADMIN — PROPOFOL 200 MG: 10 INJECTION, EMULSION INTRAVENOUS at 08:24

## 2023-12-05 RX ADMIN — FUROSEMIDE 10 MG: 10 INJECTION, SOLUTION INTRAVENOUS at 09:03

## 2023-12-05 RX ADMIN — LIDOCAINE HYDROCHLORIDE 100 MG: 20 INJECTION, SOLUTION INFILTRATION; PERINEURAL at 08:24

## 2023-12-05 RX ADMIN — DEXAMETHASONE SODIUM PHOSPHATE 4 MG: 4 INJECTION, SOLUTION INTRA-ARTICULAR; INTRALESIONAL; INTRAMUSCULAR; INTRAVENOUS; SOFT TISSUE at 08:29

## 2023-12-05 RX ADMIN — FENTANYL CITRATE 25 MCG: 50 INJECTION INTRAMUSCULAR; INTRAVENOUS at 09:18

## 2023-12-05 RX ADMIN — SUCCINYLCHOLINE CHLORIDE 100 MG: 20 INJECTION, SOLUTION INTRAMUSCULAR; INTRAVENOUS; PARENTERAL at 08:24

## 2023-12-05 RX ADMIN — Medication 5 MG: at 08:40

## 2023-12-05 RX ADMIN — Medication 2 G: at 08:17

## 2023-12-05 RX ADMIN — FENTANYL CITRATE 25 MCG: 50 INJECTION INTRAMUSCULAR; INTRAVENOUS at 08:24

## 2023-12-05 RX ADMIN — ONDANSETRON 4 MG: 2 INJECTION INTRAMUSCULAR; INTRAVENOUS at 08:29

## 2023-12-05 RX ADMIN — Medication 5 MG: at 08:48

## 2023-12-05 ASSESSMENT — ACTIVITIES OF DAILY LIVING (ADL)
ADLS_ACUITY_SCORE: 35

## 2023-12-05 NOTE — DISCHARGE INSTRUCTIONS
Same Day Surgery Discharge Instructions for  Sedation and General Anesthesia     It's not unusual to feel dizzy, light-headed or faint for up to 24 hours after surgery or while taking pain medication.  If you have these symptoms: sit for a few minutes before standing and have someone assist you when you get up to walk or use the bathroom.    You should rest and relax for the next 24 hours. We recommend you make arrangements to have an adult stay with you for at least 24 hours after your discharge.  Avoid hazardous and strenuous activity.    DO NOT DRIVE any vehicle or operate mechanical equipment for 24 hours following the end of your surgery.  Even though you may feel normal, your reactions may be affected by the medication you have received.    Do not drink alcoholic beverages for 24 hours following surgery.     Slowly progress to your regular diet as you feel able. It's not unusual to feel nauseated and/or vomit after receiving anesthesia.  If you develop these symptoms, drink clear liquids (apple juice, ginger ale, broth, 7-up, etc. ) until you feel better.  If your nausea and vomiting persists for 24 hours, please notify your surgeon.      All narcotic pain medications, along with inactivity and anesthesia, can cause constipation. Drinking plenty of liquids and increasing fiber intake will help.    For any questions of a medical nature, call your surgeon.    Do not make important decisions for 24 hours.    If you had general anesthesia, you may have a sore throat for a couple of days related to the breathing tube used during surgery.  You may use Cepacol lozenges to help with this discomfort.  If it worsens or if you develop a fever, contact your surgeon.     If you feel your pain is not well managed with the pain medications prescribed by your surgeon, please contact your surgeon's office to let them know so they can address your concerns.        Cystoscopy, Holmium Laser with Stent Placement Discharge  Instructions    Holmium laser lithotripsy was used to break up your kidney stone(s). It is normal to have visible blood in the urine, burning, urgency and frequency following this procedure. These symptoms may last for a few days to weeks.     Diet:  To help pass stone fragments and clear the blood out of the urine, it is important to drink 6-8 glasses of fluids per day at home - at least 3-4 glasses should be water.     Return to the diet that you were on before the procedure, unless you are given specific diet instructions.    Activity:  Walk short distances and increase as your strength allows.  You may climb stairs.  Do not do strenuous exercise or heavy lifting until approved by surgeon.  Do not drive while taking narcotic pain medications.    Bathing:  You may take a shower.          Stent information    During surgery, a stent was placed in the ureter.  The ureter is the tube that drains urine from the kidney to the bladder.  The stent is placed to dilate (open) the ureter so the stone fragments can pass easily through the ureter or to decrease ureteral swelling after surgery, or to relieve an obstruction. The stent is made of rubber. The upper end of the stent curls in the kidney while the lower end rests in the bladder.    While the stent is in place you may experience the following symptoms:  Blood and/or small blood clots in urine.  Bladder spasm (frequency and urgency of urination).  Discomfort or aching in the back or side where the stent is.  Burning or discomfort at the end of urine stream.    To decrease these symptoms you should:  Take pain medication as prescribed.  Drink plenty of fluids.  If you experience pain at the end of urination try not emptying your bladder completely.  If having discomfort in back or side, decrease activity.    Call your physician if these signs/symptoms are present:  Pain that is not relieved by a short rest or ordered pain medications.  Temperature at or above 101.0 F  or chills.  Inability or difficulty urinating.   Excessive blood in urine.  Any questions or concerns.    **If you have questions or concerns about your procedure,  call Dr. Garnica at [phone]**

## 2023-12-05 NOTE — OP NOTE
CELINA ROBERTSBOB  1955  [id number].  12/05/2023  [facility] [id number]    Pre-op diagnosis-right hydronephrosis, right proximal ureteral stone-6 mm  Postop diagnosis-same  Procedure-cystoscopy, right retrograde, right flexible ureteroscopy, holmium laser lithotripsy, right stent.  Anesthesia-General  Surgeon-RADHA  EBL-0  Findings-6 mm right proximal stone pushed back into the renal pelvis well fragmented.  6 Iraqi by 24 cm stent.    Brief history and physical-68-year-old female with a history of right flank pain.  CT scan demonstrated the presence of a right proximal ureteral stone with mild right hydronephrosis.  Patient's pain did improve however the stone is failed to progress.  She also has multiple left renal stones.  Scheduled today for right ureteroscopy with holmium laser lithotripsy.  Procedure thoroughly explained in detail all questions were answered to her satisfaction.    After adequate general anesthesia she was placed in dorsal lithotomy position.  The skin was prepped and draped usual sterile fashion.  A 22 [place] Storz cystoscope was visually passed into the urethra, she had a grade 2 cystocele.  Right ureteral orifice was identified and a right retrograde demonstrated adequate filling of the ureter and obstructing proximal ureteral stone with right hydronephrosis.  A Glidewire then was passed through the open-ended catheter bypassing the stone into the into the renal pelvis a double-lumen catheter was placed over the Glidewire and a Amplatz Super Stiff wire was placed through the double-lumen catheter and this was passed up to the renal pelvis and the stone was pushed back into the renal pelvis.    The flexible ureter scope was then passed over the Super Stiff wire up to the renal pelvis and then removed inspection of the collecting system demonstrated that the stone was located in the middle calyx.  The stone was fragmented with the holmium laser lithotripsy into small 1 mm fragments.  There is  no injury noted to the renal pelvis.  The Amplatz Super Stiff wire then was placed through the scope into the renal pelvis under fluoroscopic guidance.  The ureteroscope and was catheter removed the Glidewire also was removed a 6 [place] by 24 cm stent was placed over the Super Stiff wire into the renal pelvis and bladder under fluoroscopic guidance in good position scope was then removed.  A 16 [place] Chavez catheter was placed she was given 10 mg of Lasix and taken to recovery in stable condition    She will require a secondary procedure to remove her stent in the office in about 1 week  are dictating a copy to primary care copy office Of the chart thank you very much.    DR CARLOS GARCIA

## 2023-12-05 NOTE — ANESTHESIA CARE TRANSFER NOTE
Patient: Ana Dupree    Procedure: Procedure(s):  CYSTOSCOPY, RIGHT URETEROSCOPY, RIGHT RETROGRADE PYELOGRAM, HOLMIUM LASER LITHOTRIPSY, AND RIGHT STENT INSERTION       Diagnosis: Kidney stone [N20.0]  Diagnosis Additional Information: No value filed.    Anesthesia Type:   General     Note:    Oropharynx: oropharynx clear of all foreign objects and spontaneously breathing  Level of Consciousness: awake  Oxygen Supplementation: face mask  Level of Supplemental Oxygen (L/min / FiO2): 6  Independent Airway: airway patency satisfactory and stable  Dentition: dentition unchanged  Vital Signs Stable: post-procedure vital signs reviewed and stable  Report to RN Given: handoff report given  Patient transferred to: Phase II    Handoff Report: Identifed the Patient, Identified the Reponsible Provider, Reviewed the pertinent medical history, Discussed the surgical course, Reviewed Intra-OP anesthesia mangement and issues during anesthesia, Set expectations for post-procedure period and Allowed opportunity for questions and acknowledgement of understanding      Vitals:  Vitals Value Taken Time   /74 12/05/23 0919   Temp     Pulse 74 12/05/23 0921   Resp 12 12/05/23 0921   SpO2 99 % 12/05/23 0921   Vitals shown include unfiled device data.    Electronically Signed By: AFIA Vail CRNA  December 5, 2023  9:21 AM

## 2023-12-05 NOTE — BRIEF OP NOTE
UROLOGY BRIEF OP NOTE  PREOP DX- RT HYDRO, RT PROXIMAL STONE 6MM  POSTOP DX - SAME  PROCEDURE -CYSTO RT RETRO, RT FLEX. URETEROSCOPY, HLL, RT STENT  ANES- GEN  SURGEON - RADHA  EBL - NONE  FINDINGS 6MM RT PROX. STONE WELL FRAGMENTED.[id number] STENT

## 2023-12-05 NOTE — OR NURSING
VSS, denies pain. Able to void. Discharge instructions reviewed and verbal instructions given, verbal understanding given to Rn. Transported to door 1 to go home.

## 2023-12-05 NOTE — ANESTHESIA POSTPROCEDURE EVALUATION
Patient: Ana Dupree    Procedure: Procedure(s):  CYSTOSCOPY, RIGHT URETEROSCOPY, RIGHT RETROGRADE PYELOGRAM, HOLMIUM LASER LITHOTRIPSY, AND RIGHT STENT INSERTION       Anesthesia Type:  General    Note:  Disposition: Outpatient   Postop Pain Control: Uneventful            Sign Out: Well controlled pain   PONV:    Neuro/Psych: Uneventful            Sign Out: Acceptable/Baseline neuro status   Airway/Respiratory: Uneventful            Sign Out: Acceptable/Baseline resp. status   CV/Hemodynamics: Uneventful            Sign Out: Acceptable CV status   Other NRE: NONE   DID A NON-ROUTINE EVENT OCCUR? No           Last vitals:  Vitals Value Taken Time   /74 12/05/23 0945   Temp 36.2  C (97.2  F) 12/05/23 0919   Pulse 68 12/05/23 0950   Resp 15 12/05/23 0950   SpO2 96 % 12/05/23 0950   Vitals shown include unfiled device data.    Electronically Signed By: Jabari Jaimes MD  December 5, 2023  11:19 AM

## 2023-12-05 NOTE — ANESTHESIA PROCEDURE NOTES
Airway       Patient location during procedure: OR       Procedure Start/Stop Times: 12/5/2023 8:26 AM  Staff -        CRNA: Abigail Turner APRN CRNA       Performed By: CRNA  Consent for Airway        Urgency: elective  Indications and Patient Condition       Indications for airway management: romel-procedural       Induction type:intravenous       Mask difficulty assessment: 1 - vent by mask    Final Airway Details       Final airway type: endotracheal airway       Successful airway: ETT - single  Endotracheal Airway Details        ETT size (mm): 7.0       Cuffed: yes       Successful intubation technique: video laryngoscopy       VL Blade Size: Gonzalez 4       Grade View of Cords: 1       Adjucts: stylet       Position: Right       Measured from: gums/teeth       Secured at (cm): 21       Bite block used: None    Post intubation assessment        Placement verified by: capnometry, equal breath sounds and chest rise        Number of attempts at approach: 1       Number of other approaches attempted: 0       Secured with: tape       Ease of procedure: easy       Dentition: Unchanged and Intact    Medication(s) Administered   Medication Administration Time: 12/5/2023 8:26 AM

## 2024-05-26 ENCOUNTER — HEALTH MAINTENANCE LETTER (OUTPATIENT)
Age: 69
End: 2024-05-26

## 2024-07-21 NOTE — TELEPHONE ENCOUNTER
"Patient calling with upper abdominal \"sharp\" pain. Feels similar to constipation. Please return call to advise.   "
Called pt and pt is currently in the ER.  Reassured pt she is in the right place and will be taken good care of.    Ilsa Alves RN on 7/23/2021 at 4:14 PM    
The procedure was performed independently

## 2024-12-21 ENCOUNTER — HEALTH MAINTENANCE LETTER (OUTPATIENT)
Age: 69
End: 2024-12-21

## 2025-06-14 ENCOUNTER — HEALTH MAINTENANCE LETTER (OUTPATIENT)
Age: 70
End: 2025-06-14

## (undated) DEVICE — SUCTION CANISTER MEDIVAC LINER 3000ML W/LID 65651-530

## (undated) DEVICE — Device

## (undated) DEVICE — ENDO TROCAR SLEEVE KII Z-THREADED 05X100MM CTS02

## (undated) DEVICE — ENDO SEAL BX PORT BPS-A

## (undated) DEVICE — ESU PENCIL W/HOLSTER E2350H

## (undated) DEVICE — CATH URETERAL OPEN END 6FR AXXCESS

## (undated) DEVICE — SOL WATER IRRIG 1000ML BOTTLE 2F7114

## (undated) DEVICE — SYR 50ML LL W/O NDL 309653

## (undated) DEVICE — LINEN TOWEL PACK X5 5464

## (undated) DEVICE — GUIDEWIRE ZIPWIRE STIFF .035IN STRAIGHT TIP M0066802221

## (undated) DEVICE — SUCTION IRR STRYKERFLOW II W/TIP 250-070-520

## (undated) DEVICE — CATH TRAY FOLEY SURESTEP 16FR W/URNE MTR STLK LATEX A303316A

## (undated) DEVICE — DEVICE SUTURE GRASPER TROCAR CLOSURE 14GA PMITCSG

## (undated) DEVICE — CATH URETERAL TIGERTAIL 05FR 70CM 139005

## (undated) DEVICE — CATH URETERAL CONE 08FR 70CM 138008LT / 138008RT

## (undated) DEVICE — GLOVE BIOGEL PI MICRO SZ 7.5 48575

## (undated) DEVICE — SOL NACL 0.9% INJ 1000ML BAG 2B1324X

## (undated) DEVICE — HOLMIUM LASER TECH

## (undated) DEVICE — PACK LAP CHOLE SLC15LCFSD

## (undated) DEVICE — SU VICRYL 0 UR-6 27" J603H

## (undated) DEVICE — TUBING SUCTION 12"X1/4" N612

## (undated) DEVICE — PREP CHLORAPREP 26ML TINTED ORANGE  260815

## (undated) DEVICE — GLOVE PROTEXIS W/NEU-THERA 6.5  2D73TE65

## (undated) DEVICE — SYSTEM CLEARIFY VISUALIZATION 21-345

## (undated) DEVICE — LASER FIBER HOLMIUM MOSES 200 D/F/L AC-10030100

## (undated) DEVICE — ESU GROUND PAD UNIVERSAL W/O CORD

## (undated) DEVICE — PAD CHUX UNDERPAD 23X24" 7136

## (undated) DEVICE — ESU HOLDER LAP INST DISP PURPLE LONG 330MM H-PRO-330

## (undated) DEVICE — EVAC SYSTEM CLEAR FLOW SC082500

## (undated) DEVICE — ENDO POUCH UNIV RETRIEVAL SYSTEM INZII 10MM CD001

## (undated) DEVICE — SU MONOCRYL 4-0 PS-2 18" UND Y496G

## (undated) DEVICE — SOL WATER IRRIG 3000ML BAG 2B7117

## (undated) DEVICE — DRAPE COVER C-ARM SEAMLESS SNAP-KAP 03-KP26 LATEX FREE

## (undated) DEVICE — ENDO TROCAR FIRST ENTRY KII FIOS Z-THRD 11X100MM CTF33

## (undated) DEVICE — ENDO TROCAR FIRST ENTRY KII FIOS Z-THRD 05X100MM CTF03

## (undated) DEVICE — SU VICRYL 3-0 SH 27" J316H

## (undated) DEVICE — ESU ELEC BLADE 2.75" COATED/INSULATED E1455

## (undated) DEVICE — RAD RX ISOVUE 300 (50ML) 61% IOPAMIDOL CHARGE PER ML

## (undated) DEVICE — GUIDEWIRE AMPLATZ SUPER STIFF .035 X 145CM M0066401080

## (undated) DEVICE — PACK CYSTOSCOPY SBA15CYFSI

## (undated) DEVICE — CLIP APPLIER ENDO ROTATING 10MM MED/LG ER320

## (undated) RX ORDER — GLYCOPYRROLATE 0.2 MG/ML
INJECTION, SOLUTION INTRAMUSCULAR; INTRAVENOUS
Status: DISPENSED
Start: 2021-05-11

## (undated) RX ORDER — FENTANYL CITRATE 50 UG/ML
INJECTION, SOLUTION INTRAMUSCULAR; INTRAVENOUS
Status: DISPENSED
Start: 2021-06-22

## (undated) RX ORDER — LIDOCAINE HYDROCHLORIDE 10 MG/ML
INJECTION, SOLUTION INFILTRATION; PERINEURAL
Status: DISPENSED
Start: 2021-06-28

## (undated) RX ORDER — ONDANSETRON 2 MG/ML
INJECTION INTRAMUSCULAR; INTRAVENOUS
Status: DISPENSED
Start: 2021-06-20

## (undated) RX ORDER — NEOSTIGMINE METHYLSULFATE 1 MG/ML
VIAL (ML) INJECTION
Status: DISPENSED
Start: 2021-05-11

## (undated) RX ORDER — DEXAMETHASONE SODIUM PHOSPHATE 4 MG/ML
INJECTION, SOLUTION INTRA-ARTICULAR; INTRALESIONAL; INTRAMUSCULAR; INTRAVENOUS; SOFT TISSUE
Status: DISPENSED
Start: 2021-05-11

## (undated) RX ORDER — PROPOFOL 10 MG/ML
INJECTION, EMULSION INTRAVENOUS
Status: DISPENSED
Start: 2021-06-20

## (undated) RX ORDER — ONDANSETRON 2 MG/ML
INJECTION INTRAMUSCULAR; INTRAVENOUS
Status: DISPENSED
Start: 2021-05-11

## (undated) RX ORDER — FENTANYL CITRATE 50 UG/ML
INJECTION, SOLUTION INTRAMUSCULAR; INTRAVENOUS
Status: DISPENSED
Start: 2023-12-05

## (undated) RX ORDER — PROPOFOL 10 MG/ML
INJECTION, EMULSION INTRAVENOUS
Status: DISPENSED
Start: 2021-05-11

## (undated) RX ORDER — EPHEDRINE SULFATE 50 MG/ML
INJECTION, SOLUTION INTRAMUSCULAR; INTRAVENOUS; SUBCUTANEOUS
Status: DISPENSED
Start: 2023-12-05

## (undated) RX ORDER — LIDOCAINE HYDROCHLORIDE 10 MG/ML
INJECTION, SOLUTION INFILTRATION; PERINEURAL
Status: DISPENSED
Start: 2021-06-22

## (undated) RX ORDER — PROPOFOL 10 MG/ML
INJECTION, EMULSION INTRAVENOUS
Status: DISPENSED
Start: 2021-06-22

## (undated) RX ORDER — FENTANYL CITRATE 50 UG/ML
INJECTION, SOLUTION INTRAMUSCULAR; INTRAVENOUS
Status: DISPENSED
Start: 2021-06-23

## (undated) RX ORDER — FENTANYL CITRATE 50 UG/ML
INJECTION, SOLUTION INTRAMUSCULAR; INTRAVENOUS
Status: DISPENSED
Start: 2021-06-20

## (undated) RX ORDER — LIDOCAINE HYDROCHLORIDE 20 MG/ML
INJECTION, SOLUTION EPIDURAL; INFILTRATION; INTRACAUDAL; PERINEURAL
Status: DISPENSED
Start: 2021-05-11

## (undated) RX ORDER — KETOROLAC TROMETHAMINE 30 MG/ML
INJECTION, SOLUTION INTRAMUSCULAR; INTRAVENOUS
Status: DISPENSED
Start: 2021-05-11

## (undated) RX ORDER — PROPOFOL 10 MG/ML
INJECTION, EMULSION INTRAVENOUS
Status: DISPENSED
Start: 2023-12-05

## (undated) RX ORDER — FENTANYL CITRATE 50 UG/ML
INJECTION, SOLUTION INTRAMUSCULAR; INTRAVENOUS
Status: DISPENSED
Start: 2021-05-11

## (undated) RX ORDER — FENTANYL CITRATE 50 UG/ML
INJECTION, SOLUTION INTRAMUSCULAR; INTRAVENOUS
Status: DISPENSED
Start: 2023-11-08

## (undated) RX ORDER — FENTANYL CITRATE 50 UG/ML
INJECTION, SOLUTION INTRAMUSCULAR; INTRAVENOUS
Status: DISPENSED
Start: 2021-06-28

## (undated) RX ORDER — ALBUTEROL SULFATE 90 UG/1
AEROSOL, METERED RESPIRATORY (INHALATION)
Status: DISPENSED
Start: 2021-05-11

## (undated) RX ORDER — FUROSEMIDE 10 MG/ML
INJECTION INTRAMUSCULAR; INTRAVENOUS
Status: DISPENSED
Start: 2023-12-05

## (undated) RX ORDER — FENTANYL CITRATE 0.05 MG/ML
INJECTION, SOLUTION INTRAMUSCULAR; INTRAVENOUS
Status: DISPENSED
Start: 2021-05-11

## (undated) RX ORDER — CEFAZOLIN SODIUM 1 G/3ML
INJECTION, POWDER, FOR SOLUTION INTRAMUSCULAR; INTRAVENOUS
Status: DISPENSED
Start: 2021-06-20